# Patient Record
Sex: MALE | Race: WHITE | Employment: OTHER | URBAN - METROPOLITAN AREA
[De-identification: names, ages, dates, MRNs, and addresses within clinical notes are randomized per-mention and may not be internally consistent; named-entity substitution may affect disease eponyms.]

---

## 2017-11-17 ENCOUNTER — OFFICE VISIT (OUTPATIENT)
Dept: SURGERY | Age: 82
End: 2017-11-17

## 2017-11-17 ENCOUNTER — TELEPHONE (OUTPATIENT)
Dept: SURGERY | Age: 82
End: 2017-11-17

## 2017-11-17 VITALS
HEART RATE: 73 BPM | HEIGHT: 67 IN | DIASTOLIC BLOOD PRESSURE: 84 MMHG | BODY MASS INDEX: 32.49 KG/M2 | TEMPERATURE: 99.3 F | WEIGHT: 207 LBS | OXYGEN SATURATION: 95 % | SYSTOLIC BLOOD PRESSURE: 163 MMHG

## 2017-11-17 DIAGNOSIS — C43.61: Primary | ICD-10-CM

## 2017-11-17 PROCEDURE — 4040F PNEUMOC VAC/ADMIN/RCVD: CPT | Performed by: SURGERY

## 2017-11-17 PROCEDURE — 1123F ACP DISCUSS/DSCN MKR DOCD: CPT | Performed by: SURGERY

## 2017-11-17 PROCEDURE — G8427 DOCREV CUR MEDS BY ELIG CLIN: HCPCS | Performed by: SURGERY

## 2017-11-17 PROCEDURE — G8417 CALC BMI ABV UP PARAM F/U: HCPCS | Performed by: SURGERY

## 2017-11-17 PROCEDURE — 4004F PT TOBACCO SCREEN RCVD TLK: CPT | Performed by: SURGERY

## 2017-11-17 PROCEDURE — G8484 FLU IMMUNIZE NO ADMIN: HCPCS | Performed by: SURGERY

## 2017-11-17 PROCEDURE — 99205 OFFICE O/P NEW HI 60 MIN: CPT | Performed by: SURGERY

## 2017-11-17 RX ORDER — ASPIRIN 81 MG/1
81 TABLET ORAL DAILY
COMMUNITY

## 2017-11-17 NOTE — LETTER
[facility]) Surgical Oncology and General Surgery  [facility] MD  [address], [place] [address]  Phone: [phone]  Fax: [phone]    11/17/2017     Patient: Zenia Millan   MR Number: [id number]    YOB: 1931     Thank you for referring Zenia Millan to me for evaluation. Below are the relevant portions of my assessment and plan of care. Reason for Consult: Dr. Johnathan Benoit asked me to see Adalberto Andrews for evaluation of melanoma of right arm. Present Illness: Patient is a 80 y.o.  male presents to his dermatologist with a mole on the right arm. The lesion was approximately the size of pencil head and has been present for months. It has recently increased in size. It has not been bleeding. The lesion has not been pruritic. Patient does not have any other lesions of concern. Biopsy of the lesion was positive for melanoma. No personal or family history of melanoma. Zana does not have significant subcutaneous mass, swelling in right axilla, cough, abdominal pain, jaundice, blood in stools, headaches, recent neurological changes or bone pain to indicate any metastatic disease. Review of Systems: See HPI, all other systems reviewed and are negative. Physical Examination:    BP (!) 163/84 Comment: recheck after 5 minutes  Pulse 73   Temp 99.3 °F (37.4 °C) (Oral)   Ht 5' 7\" (1.702 m)   Wt 207 lb (93.9 kg)   SpO2 95%   BMI 32.42 kg/m²      General:  Alert, oriented x 3, cooperative, no distress, appears stated age. Skin: Skin color, texture, turgor normal. There is a biopsy site on his right . There are no surrounding areas of nodularity or pigmentation. Other suspicious skin lesions: no.   Lymph nodes: Cervical, supraclavicular, and axillary nodes normal.   HENT:  Normocephalic, without obvious abnormality. Moist mucus membranes. No icterus. Neck: Supple, symmetrical, trachea midline, no thyromegaly. wound issues were explained. Risks, benefits and alternatives of surgery explained to the patient and patient wishes to proceed with surgery. Adjuvant management will depend on final pathology. All the questions of the patient are answered to his apparent satisfaction. Patient verbalized understanding of the management plan. My office will schedule the surgery and will inform the patient about preoperative instructions. If you have questions, please do not hesitate to call me. I look forward to following  Zana along with you.     Sincerely,    Narayan Meraz MD  Surgical Oncologist / General Surgeon

## 2017-11-17 NOTE — PROGRESS NOTES
for doing this based on the [place] measures recommended the by [facility] Melanoma Unit were given. I am going to obtain PET given 3.6 mm thickness with bad prognostic factors to rule out metastatic disease. The lesion can be treated with: wide excision and sentinel lymph node biopsy under general anesthesia pending PET scan results. .  I explained patient about the surgery. All the complications including wound issues were explained. Risks, benefits and alternatives of surgery explained to the patient and patient wishes to proceed with surgery. Adjuvant management will depend on final pathology. All the questions of the patient are answered to his apparent satisfaction. Patient verbalized understanding of the management plan. My office will schedule the surgery and will inform the patient about preoperative instructions.

## 2017-11-20 ENCOUNTER — TELEPHONE (OUTPATIENT)
Dept: SURGERY | Age: 82
End: 2017-11-20

## 2017-11-30 ENCOUNTER — TELEPHONE (OUTPATIENT)
Dept: SURGERY | Age: 82
End: 2017-11-30

## 2017-11-30 ENCOUNTER — HOSPITAL ENCOUNTER (OUTPATIENT)
Dept: OTHER | Age: 82
Discharge: HOME | End: 2017-11-30
Attending: SURGERY | Admitting: SURGERY

## 2017-11-30 VITALS — HEIGHT: 67 IN | WEIGHT: 207 LBS | BODY MASS INDEX: 32.49 KG/M2

## 2017-11-30 DIAGNOSIS — C43.61: ICD-10-CM

## 2017-11-30 RX ORDER — FLUDEOXYGLUCOSE F 18 200 MCI/ML
16.14 INJECTION, SOLUTION INTRAVENOUS
Status: COMPLETED | OUTPATIENT
Start: 2017-11-30 | End: 2017-11-30

## 2017-11-30 RX ADMIN — FLUDEOXYGLUCOSE F 18 16.14 MILLICURIE: 200 INJECTION, SOLUTION INTRAVENOUS at 10:23

## 2017-12-01 ENCOUNTER — TELEPHONE (OUTPATIENT)
Dept: SURGERY | Age: 82
End: 2017-12-01

## 2017-12-05 ENCOUNTER — HOSPITAL ENCOUNTER (OUTPATIENT)
Dept: PREADMISSION TESTING | Age: 82
Discharge: HOME | End: 2017-12-05
Attending: SURGERY | Admitting: SURGERY

## 2017-12-05 VITALS — BODY MASS INDEX: 32.49 KG/M2 | HEIGHT: 67 IN | WEIGHT: 207 LBS

## 2017-12-05 RX ORDER — CHLORHEXIDINE GLUCONATE 0.12 MG/ML
15 RINSE ORAL 2 TIMES DAILY
Status: CANCELLED | OUTPATIENT
Start: 2017-12-05

## 2017-12-05 RX ORDER — CHLORHEXIDINE GLUCONATE 0.12 MG/ML
15 RINSE ORAL 2 TIMES DAILY
Status: CANCELLED | OUTPATIENT
Start: 2017-12-05 | End: 2017-12-06

## 2017-12-05 NOTE — PROGRESS NOTES
PRE-OP INSTRUCTIONS FOR THE SURGICAL PATIENT YOU ARE UNABLE TO MAKE CONTACT FOR AN INTERVIEW:  UNABLE TO LM ON HOME PHONE. LMOR FOR DAUGHTER (NUMBER GIVEN BY SURGEON'S)    1. Follow instructions for your ARRIVAL TIME as DIRECTED BY YOUR SURGEON. 2. Enter the MAIN entrance located on [address] and report to the desk. 3. Bring your insurance & prescription card and photo ID with you. You may also be asked to pay a co-pay, as you may want to bring a check or credit card with you. 4. Leave all other valuables at home. 5. Arrange for someone to drive you home and be with you for the first 24 hours after discharge. 6. You must contact your surgeon for ALL medication instructions, especially if taking blood thinners, aspirin, or diabetic medication. 7. A Pre-op History and Physical for surgery MUST be completed by your Physician or an Urgent Care within 30 days of your procedure date. Please bring a copy with you on the day of your procedure and along with any other testing performed. 8. DO NOT EAT OR DRINK ANYTHING AFTER MIDNIGHT, including gum, candy, mints or ice chips TAKE AMLODIPINE AND TAMSULOSIN  9. Dress in loose, comfortable clothing appropriate for redressing after your procedure. Do not wear jewelry (including body piercings), make-up, fingernail polish, lotion, powders or metal hairclips. Contacts will need to be removed prior to surgery. 10. If you use a CPAP, please bring it with you on the day of your procedure. 11. Do not shave or wax for 72 hours prior to procedure near your operative site  12. FOR WOMAN OF CHILDBEARING AGE ONLY- please bring a urine sample with you on day of surgery or make sure we can collect on arrival.    If you have further questions, you may contact us at [phone]    Left instructions on patient's voicemail. Eliel Burris. 12/5/2017 .1:09 PM      H&p on chart

## 2017-12-06 ENCOUNTER — HOSPITAL ENCOUNTER (OUTPATIENT)
Dept: SURGERY | Age: 82
Discharge: HOME | End: 2017-12-06
Admitting: SURGERY

## 2017-12-06 VITALS
HEIGHT: 67 IN | TEMPERATURE: 97.2 F | SYSTOLIC BLOOD PRESSURE: 153 MMHG | BODY MASS INDEX: 32.49 KG/M2 | OXYGEN SATURATION: 96 % | HEART RATE: 63 BPM | RESPIRATION RATE: 14 BRPM | WEIGHT: 207 LBS | DIASTOLIC BLOOD PRESSURE: 73 MMHG

## 2017-12-06 DIAGNOSIS — C43.61 MALIGNANT MELANOMA OF RIGHT UPPER EXTREMITY INCLUDING SHOULDER (HCC): ICD-10-CM

## 2017-12-06 PROCEDURE — 38525 BIOPSY/REMOVAL LYMPH NODES: CPT | Performed by: SURGERY

## 2017-12-06 PROCEDURE — 13122 CMPLX RPR S/A/L ADDL 5 CM/>: CPT | Performed by: SURGERY

## 2017-12-06 PROCEDURE — 13121 CMPLX RPR S/A/L 2.6-7.5 CM: CPT | Performed by: SURGERY

## 2017-12-06 PROCEDURE — 11606 EXC TR-EXT MAL+MARG >4 CM: CPT | Performed by: SURGERY

## 2017-12-06 RX ORDER — ONDANSETRON 2 MG/ML
4 INJECTION INTRAMUSCULAR; INTRAVENOUS
Status: ACTIVE | OUTPATIENT
Start: 2017-12-06 | End: 2017-12-06

## 2017-12-06 RX ORDER — HYDROCODONE BITARTRATE AND ACETAMINOPHEN 5; 325 MG/1; MG/1
1 TABLET ORAL ONCE
Status: COMPLETED | OUTPATIENT
Start: 2017-12-06 | End: 2017-12-06

## 2017-12-06 RX ORDER — HYDRALAZINE HYDROCHLORIDE 20 MG/ML
5 INJECTION INTRAMUSCULAR; INTRAVENOUS EVERY 10 MIN PRN
Status: DISCONTINUED | OUTPATIENT
Start: 2017-12-06 | End: 2017-12-07 | Stop reason: HOSPADM

## 2017-12-06 RX ORDER — LIDOCAINE HYDROCHLORIDE 10 MG/ML
1 INJECTION, SOLUTION EPIDURAL; INFILTRATION; INTRACAUDAL; PERINEURAL
Status: ACTIVE | OUTPATIENT
Start: 2017-12-06 | End: 2017-12-06

## 2017-12-06 RX ORDER — SODIUM CHLORIDE, SODIUM LACTATE, POTASSIUM CHLORIDE, CALCIUM CHLORIDE 600; 310; 30; 20 MG/100ML; MG/100ML; MG/100ML; MG/100ML
INJECTION, SOLUTION INTRAVENOUS CONTINUOUS
Status: DISCONTINUED | OUTPATIENT
Start: 2017-12-06 | End: 2017-12-07 | Stop reason: HOSPADM

## 2017-12-06 RX ORDER — HYDROCODONE BITARTRATE AND ACETAMINOPHEN 5; 325 MG/1; MG/1
1 TABLET ORAL EVERY 6 HOURS PRN
Qty: 20 TABLET | Refills: 0 | Status: SHIPPED | OUTPATIENT
Start: 2017-12-06 | End: 2017-12-11

## 2017-12-06 RX ORDER — SODIUM CHLORIDE 0.9 % (FLUSH) 0.9 %
10 SYRINGE (ML) INJECTION EVERY 12 HOURS SCHEDULED
Status: DISCONTINUED | OUTPATIENT
Start: 2017-12-06 | End: 2017-12-07 | Stop reason: HOSPADM

## 2017-12-06 RX ORDER — SODIUM CHLORIDE 0.9 % (FLUSH) 0.9 %
10 SYRINGE (ML) INJECTION PRN
Status: DISCONTINUED | OUTPATIENT
Start: 2017-12-06 | End: 2017-12-07 | Stop reason: HOSPADM

## 2017-12-06 RX ORDER — LABETALOL HYDROCHLORIDE 5 MG/ML
5 INJECTION, SOLUTION INTRAVENOUS EVERY 10 MIN PRN
Status: DISCONTINUED | OUTPATIENT
Start: 2017-12-06 | End: 2017-12-07 | Stop reason: HOSPADM

## 2017-12-06 RX ADMIN — HYDROCODONE BITARTRATE AND ACETAMINOPHEN 1 TABLET: 5; 325 TABLET ORAL at 15:59

## 2017-12-06 RX ADMIN — SODIUM CHLORIDE, SODIUM LACTATE, POTASSIUM CHLORIDE, CALCIUM CHLORIDE: 600; 310; 30; 20 INJECTION, SOLUTION INTRAVENOUS at 12:23

## 2017-12-06 ASSESSMENT — PAIN SCALES - GENERAL
PAINLEVEL_OUTOF10: 4
PAINLEVEL_OUTOF10: 0

## 2017-12-06 ASSESSMENT — PAIN DESCRIPTION - PAIN TYPE: TYPE: SURGICAL PAIN

## 2017-12-06 ASSESSMENT — PAIN DESCRIPTION - ORIENTATION: ORIENTATION: RIGHT

## 2017-12-06 ASSESSMENT — PAIN DESCRIPTION - LOCATION: LOCATION: OTHER (COMMENT)

## 2017-12-06 ASSESSMENT — PAIN - FUNCTIONAL ASSESSMENT: PAIN_FUNCTIONAL_ASSESSMENT: 0-10

## 2017-12-06 NOTE — ANESTHESIA PRE-OP
Department of Anesthesiology  Preprocedure Note       Name:  Kristin Main   Age:  80 y.o.  :  1931                                          MRN:  [id number]         Date:  2017      Surgeon:    Procedure:    Medications prior to admission:   Prior to Admission medications    Medication Sig Start Date End Date Taking? Authorizing Provider   tamsulosin (FLOMAX) 0.4 MG capsule Take 0.4 mg by mouth daily   Yes Historical Provider, MD   AmLODIPine Besylate (NORVASC PO) Take 1 tablet by mouth daily   Yes Historical Provider, MD   senna (SENOKOT) 8.6 MG tablet Take 1 tablet by mouth daily   Yes Historical Provider, MD   aspirin 81 MG EC tablet Take 81 mg by mouth daily    Historical Provider, MD       Current medications:    Current Outpatient Prescriptions   Medication Sig Dispense Refill    tamsulosin (FLOMAX) 0.4 MG capsule Take 0.4 mg by mouth daily      AmLODIPine Besylate (NORVASC PO) Take 1 tablet by mouth daily      senna (SENOKOT) 8.6 MG tablet Take 1 tablet by mouth daily      aspirin 81 MG EC tablet Take 81 mg by mouth daily       Current Facility-Administered Medications   Medication Dose Route Frequency Provider Last Rate Last Dose    lactated ringers infusion   Intravenous Continuous Calos Bro DO        ceFAZolin (ANCEF) 2 g in dextrose 3 % 50 mL IVPB (duplex)  2 g Intravenous Once Cheryl Mcconnell MD           Allergies:  No Known Allergies    Problem List:  There is no problem list on file for this patient.       Past Medical History:        Diagnosis Date    GERD with esophagitis     Hearing loss     [place]hard [place] hearing[place]     Hyperlipidemia     Hypertension     Melanoma ([address]     right elbow    Wears dentures     upper/lower       Past Surgical History:        Procedure Laterality Date    EYE SURGERY      cataracts merary  2010    FEMUR SURGERY Left     eleazar    HIP SURGERY Right     right hip plate       Social History:    Social History   Substance Use Topics    Smoking status: Former Smoker    Smokeless tobacco: Current User     Types: Chew    Alcohol use No                                Ready to quit: Not Answered  Counseling given: Not Answered      Vital Signs (Current):   Vitals:    12/06/17 0954   BP: (!) 162/80   Pulse: 77   Resp: 16   Temp: 98.1 °F (36.7 °C)   TempSrc: Oral   SpO2: 95%   Weight: 207 lb (93.9 kg)   Height: 5' 7\" (1.702 m)                                              BP Readings from Last 3 Encounters:   12/06/17 (!) 162/80   11/17/17 (!) 163/84   12/16/15 182/60       NPO Status: Time of last liquid consumption: 2300                        Time of last solid consumption: 2300                        Date of last liquid consumption: 12/05/17                        Date of last solid food consumption: 12/05/17    BMI:   Wt Readings from Last 3 Encounters:   12/06/17 207 lb (93.9 kg)   12/05/17 207 lb (93.9 kg)   11/30/17 207 lb (93.9 kg)     Body mass index is 32.42 kg/m². Anesthesia Evaluation  Patient summary reviewed no history of anesthetic complications:   Airway: Mallampati: II  TM distance: >3 FB   Neck ROM: full  Mouth opening: > = 3 FB Dental:    (+) upper dentures, lower dentures and edentulous      Pulmonary:                             ROS comment: Former smoker   Cardiovascular:    (+) hypertension:,                   Neuro/Psych:               GI/Hepatic/Renal:   (+) GERD:,           Endo/Other:                      ROS comment: melanoma Abdominal:           Vascular:                                        Anesthesia Plan      general     ASA 3     (Patient very hard of hearing)  Induction: intravenous. Anesthetic plan and risks discussed with patient.                       Sonam Martin MD   12/6/2017

## 2017-12-06 NOTE — PROGRESS NOTES
Ambulatory Surgery/Procedure Discharge Note    Vitals:    12/06/17 1541   BP: (!) 153/73   Pulse: 63   Resp: 14   Temp: 97.2 °F (36.2 °C)   SpO2: 96%       In: 158 [P.O.:60; I.V.:98]  Out: -     Pain assessment:  {Pain assessment  Pain Level:  (calls it irritating and annoying)     Clean right arm ace wrap. Right fingers warm with instant refill. Wanting pain med. Given soda and crackers. Discharge instructions reviewed. Patient and son/daughter educated, using the teach back method, about follow up instructions and discharge instructions. A completed copy of the AVS instructions given to patient and all questions answered. Up to void. Sling on per Dr Adilson Oreilly order. Aware of weight lifting restrictions. Aware not to leave arm in sling to allow right shoulder and elbow movement. Up to void. steady when up. Adhesive remover sent to remove tape securing gauze at IV site. BP within 20% of pre op  Children states they feel comfortable taking him home. 1700 He states the pain at right axilla is less than earlier and improving. Unable to assign a number to pain. Patient discharged to home/self care.  Patient discharged via wheel chair by transporter to waiting family/S.O.       12/6/2017 4:06 PM

## 2017-12-06 NOTE — H&P
David Skyler    [id number]    [facility] Same Day Surgery Update H & P  Department of General Surgery   Surgical Service   CNP Pre-operative History and Physical  Last H & P within the last 30 days. DIAGNOSIS:   MELANOMA RIGHT ELBOW / MALIGNANT     PROCEDURE:  Right Elbow Melanoma Excision And Copen Lymph Node Biopsy      HISTORY OF PRESENT ILLNESS: Pt. Is a 80 y.o. Male who is here for surgical intervention for Right Elbow Melanoma Excision And Copen Lymph Node Biopsy for treatment of Malignant Melanoma Right Elbow. Please see initial H & P     Past Medical History:        Diagnosis Date    GERD with esophagitis     Hearing loss     [place] (hard of hearing)     Hyperlipidemia     Hypertension     Melanoma ([address]     right elbow    Wears dentures     upper/lower     Past Surgical History:        Procedure Laterality Date    EYE SURGERY      cataracts merary  2010    FEMUR SURGERY Left     eleazar    HIP SURGERY Right     right hip plate     Past Social History:  Social History     Social History    Marital status:      Spouse name: N/A    Number of children: N/A    Years of education: N/A     Social History Main Topics    Smoking status: Former Smoker    Smokeless tobacco: Current User     Types: Chew    Alcohol use No    Drug use: No    Sexual activity: Not on file     Other Topics Concern    Not on file     Social History Narrative    No narrative on file         Medications Prior to Admission:      Prior to Admission medications    Medication Sig Start Date End Date Taking?  Authorizing Provider   tamsulosin (FLOMAX) 0.4 MG capsule Take 0.4 mg by mouth daily   Yes Historical Provider, MD   AmLODIPine Besylate (NORVASC PO) Take 1 tablet by mouth daily   Yes Historical Provider, MD   senna (SENOKOT) 8.6 MG tablet Take 1 tablet by mouth daily   Yes Historical Provider, MD   aspirin 81 MG EC tablet Take 81 mg by mouth daily    Historical Provider, MD         Allergies:  Review of patient's allergies indicates no known allergies. PHYSICAL EXAM:      BP (!) 162/80   Pulse 77   Temp 98.1 °F (36.7 °C) (Oral)   Resp 16   Ht 5' 7\" (1.702 m)   Wt 207 lb (93.9 kg)   SpO2 95%   BMI 32.42 kg/m²      Heart:  regular rate and rhythm,no murmur     Lungs:  No increased work of breathing, good air exchange, clear to auscultation bilaterally, no crackles or wheezing    Abdomen:  soft, non-distended, non-tender, no rebound tenderness or guarding, normal active bowel sounds and no masses palpated    ASSESSMENT AND PLAN:    1. Patient seen and focused exam done today- no new changes since last physical exam on 12/1/17    2. Access to ancillary services are available per request of the provider.     Barb Ansari CNP     12/6/2017

## 2017-12-06 NOTE — PROGRESS NOTES
PACU Transfer to [facility]    Vitals:    12/06/17 1530   BP: (!) 166/61   Pulse: 64   Resp: 12   Temp: 97.8 °F (36.6 °C)   SpO2: 95%         Intake/Output Summary (Last 24 hours) at 12/06/17 1539  Last data filed at 12/06/17 1530   Gross per 24 hour   Intake              908 ml   Output               20 ml   Net              888 ml       Pain assessment:  Patient denied pain medication when offered in PACU prior to transfer to [place] [address]. Pain Level: 4    Patient transferred to care of [facility] RN. Patient meets femi discharge criteria from PACU. All belongings sent with patient to [facility] #6. No further changes.      12/6/2017 3:39 PM

## 2017-12-12 ENCOUNTER — TELEPHONE (OUTPATIENT)
Dept: SURGERY | Age: 82
End: 2017-12-12

## 2017-12-13 ENCOUNTER — TELEPHONE (OUTPATIENT)
Dept: SURGERY | Age: 82
End: 2017-12-13

## 2017-12-13 NOTE — TELEPHONE ENCOUNTER
RN reviewed pt's pathology with pt's daughter who verbalized an understanding. Estephania Caraballo is going to discuss with her father and family as to where they would like to go from here and how aggressive there going to be and get back with RN to let us know their wishes.

## 2017-12-19 ENCOUNTER — OFFICE VISIT (OUTPATIENT)
Dept: SURGERY | Age: 82
End: 2017-12-19

## 2017-12-19 VITALS
OXYGEN SATURATION: 96 % | SYSTOLIC BLOOD PRESSURE: 160 MMHG | HEART RATE: 74 BPM | DIASTOLIC BLOOD PRESSURE: 83 MMHG | BODY MASS INDEX: 32.11 KG/M2 | TEMPERATURE: 97.6 F | WEIGHT: 205 LBS

## 2017-12-19 DIAGNOSIS — Z98.890 POSTOPERATIVE STATE: ICD-10-CM

## 2017-12-19 DIAGNOSIS — C77.9 METASTATIC MELANOMA TO LYMPH NODE (HCC): ICD-10-CM

## 2017-12-19 DIAGNOSIS — C43.61: Primary | ICD-10-CM

## 2017-12-19 PROCEDURE — 99024 POSTOP FOLLOW-UP VISIT: CPT | Performed by: SURGERY

## 2017-12-19 NOTE — LETTER
[facility]) Surgical Oncology and General Surgery  [facility] MD [address], [place] [address] [address]  Phone: [phone]  Fax: [phone]    12/19/2017     Patient: Siddharth Lemon   MR Number: [id number]    YOB: 1931     Thank you for referring Siddharth Lemon to me for evaluation. Below are the relevant portions of my assessment and plan of care. Zana is here for postop evaluation. He is gradually improving since then. Minimal pain now. No other complaints. O/E: Alert, oriented x 3, sitting comfortably with out any discomfort  No respiratory distress  Postop wound - healing well. Path - A.  Right axillary sentinel lymph node, excisional biopsy:  - Metastatic malignant melanoma involving 1 of 1 sentinel lymph node. - Metastatic malignant melanoma involving 2 of 6 additional  (non-sentinel) lymph nodes. B.  Right axillary non-sentinel lymph node, excisional biopsy:  - One lymph node, negative for neoplasm. Zac Ramos, right elbow, wide excision:  - Biopsy site changes.  - No residual melanoma identified. A/P: S/P Right elbow melanoma excision and sentinel lymph node biopsy 12-6-17, doing well  No postop complications  Path as above - lymph node positive disease  Explained in detail to patient and wife about further management. Will obtain imaging studies to rule out metastatic disease. I extensively discussed about the role of complete lymph node dissection (CLND) if metastatic workup is negative. The impact of the CLND as such on overall survival is not conclusive based on available literature. Alternatively we can follow him with clinical and ultrasound exams. Surgical details of the CLND explained in detail including possible complications. Complications include lymphedema, and nerve injury. All the questions of the patient are answered to his apparent satisfaction. Patient and his family verbalized understanding of the management plan. Will follow imaging study results. If you have questions, please do not hesitate to call me. I look forward to following  Zana along with you.     Sincerely,    Eliot Paiz MD  Surgical Oncologist / General Surgeon

## 2017-12-19 NOTE — PROGRESS NOTES
Zana is here for postop evaluation. He is gradually improving since then. Minimal pain now. No other complaints. O/E: Alert, oriented x 3, sitting comfortably with out any discomfort  No respiratory distress  Postop wound - healing well. Path - A.  Right axillary sentinel lymph node, excisional biopsy:  - Metastatic malignant melanoma involving 1 of 1 sentinel lymph node. - Metastatic malignant melanoma involving 2 of 6 additional  (non-sentinel) lymph nodes. B.  Right axillary non-sentinel lymph node, excisional biopsy:  - One lymph node, negative for neoplasm. [place], right elbow, wide excision:  - Biopsy site changes.  - No residual melanoma identified. A/P: S/P Right elbow melanoma excision and sentinel lymph node biopsy 12-6-17, doing well  No postop complications  Path as above - lymph node positive disease  Explained in detail to patient and wife about further management. Will obtain imaging studies to rule out metastatic disease. I extensively discussed about the role of complete lymph node dissection (CLND) if metastatic workup is negative. The impact of the CLND as such on overall survival is not conclusive based on available literature. Alternatively we can follow him with clinical and ultrasound exams. Surgical details of the CLND explained in detail including possible complications. Complications include lymphedema, and nerve injury. All the questions of the patient are answered to his apparent satisfaction. Patient and his family verbalized understanding of the management plan. Will follow imaging study results.

## 2017-12-20 DIAGNOSIS — C43.61: Primary | ICD-10-CM

## 2017-12-21 ENCOUNTER — TELEPHONE (OUTPATIENT)
Dept: SURGERY | Age: 82
End: 2017-12-21

## 2017-12-21 DIAGNOSIS — C43.61: Primary | ICD-10-CM

## 2017-12-21 NOTE — TELEPHONE ENCOUNTER
Patient's daughter Santo Prasad verbalized an understanding of date, time and all instructions for CT and MRI on 12/29/17 at [facility].

## 2017-12-21 NOTE — OP NOTE
Zana Voss  5/21/1931    PREOPERATIVE DIAGNOSIS: Malignant Melanoma right elbow     POSTOPERATIVE DIAGNOSIS: Same     PROCEDURE PERFORMED:  1. Wide excision of right elbow melanoma and Advancement flap closure  2. Right axillary sentinel lymph node biopsy     ATTENDING SURGEON: Mina Whitlock MD   ASSISTANT: Dr. Shireen Romano  PGY-V; Dr. Lindsey Saab  PGY-II     ANESTHESIA: General.     BLOOD LOSS: 20 ml   COMPLICATIONS : None  DRAINS: None   SPECIMENS REMOVED: tumor with skin measuring 14 cm x 5 cm. right axillary sentinel lymph node and non-sentinel lymph node. INDICATIONS FOR OPERATION:  Discussed with patient about wide excision of the melanoma and sentinel lymph node biopsy. The risks of surgery include infection, bleeding, recurrence of the lesion and seroma formation post-operatively. Patient verbalized understanding of the risks and benefits and wishes to proceed. DETAILS OF PROCEDURE: Patient was identified in the preoperative area and brought to the operating room. General anesthesia given. Operative site is prepped and draped in a sterile manner. Timeout procedure was performed confirming the procedure, site and administration of antibiotics. Patient had lymphoscintigram done before surgery. [place] lymph node is mapped to right axillary nodes as expected. Skin incision given over the sentinel node site. Subcutaneous tissues divided. Using gamma probe, hot node identified. Dissection done all around the node. Lymphatics controlled with multiple clips. Rest of the tissues divided with diathermy. There were no other hot nodes. A firm area of fat was excised and sent as non-sentinel tissue. Hemostasis checked and achieved. Wound was closed with subcutaneous interrupted vicryl sutures and continuous Monocryl subcuticular suture. Derma flex placed. Lesion and margin marked for the excision. The lesion was measuring 1 cm x 0.8 cm.  A margin of 2 cm was taken on all sides and it was converted into an eyeball shape to allow for the closure. This resulted in an excision area with measurements of 14 cm x 5 cm. Local anesthesia infiltrated. Skin incised with scalpel. Incision deepened to subcutaneous tissues. Electrocautery used for further division of tissues. Skin along with subcutaneous fat until fascia excised and oriented with sutures. Specimen sent to pathology. Hemostasis checked and achieved. Because the defect size, flaps are raised on all sides with diathermy. Flaps are raised above the fascia. The wound was closed in layers with interrupted 2-0 Vicryl sutures in subcutaneous tissues. The deeper sutures were taken through fascia to decrease dead space. Another layer of 3-0 interrupted vicryl deep dermal sutures were placed. Derma flex with preneo placed over the closed incision. Tegaderm clear dressing applied. The patient tolerated the procedure well. Patient was awakened and transferred to the recovery room uneventfully. I was present for the entire procedure.     Tom Sewell MD  Surgical Oncologist

## 2017-12-29 ENCOUNTER — HOSPITAL ENCOUNTER (OUTPATIENT)
Dept: CT IMAGING | Age: 82
Discharge: HOME | End: 2017-12-29
Admitting: SURGERY

## 2017-12-29 ENCOUNTER — TELEPHONE (OUTPATIENT)
Dept: SURGERY | Age: 82
End: 2017-12-29

## 2017-12-29 DIAGNOSIS — C43.61: ICD-10-CM

## 2017-12-29 DIAGNOSIS — C43.61 MALIGNANT MELANOMA OF RIGHT UPPER EXTREMITY INCLUDING SHOULDER (HCC): ICD-10-CM

## 2017-12-29 LAB
A/G RATIO: 1.4 (ref 1.1–2.2)
ALBUMIN SERPL-MCNC: 3.9 G/DL (ref 3.4–5)
ALP BLD-CCNC: 78 U/L (ref 40–129)
ALT SERPL-CCNC: 18 U/L (ref 10–40)
ANION GAP SERPL CALCULATED.3IONS-SCNC: 9 MMOL/L (ref 3–16)
AST SERPL-CCNC: 15 U/L (ref 15–37)
BILIRUB SERPL-MCNC: 0.4 MG/DL (ref 0–1)
BUN BLDV-MCNC: 20 MG/DL (ref 7–20)
CALCIUM SERPL-MCNC: 8.7 MG/DL (ref 8.3–10.6)
CHLORIDE BLD-SCNC: 98 MMOL/L (ref 99–110)
CO2: 30 MMOL/L (ref 21–32)
CREAT SERPL-MCNC: 1.2 MG/DL (ref 0.8–1.3)
GFR AFRICAN AMERICAN: >60
GFR NON-AFRICAN AMERICAN: 57
GLOBULIN: 2.7 G/DL
GLUCOSE BLD-MCNC: 107 MG/DL (ref 70–99)
POTASSIUM SERPL-SCNC: 3.8 MMOL/L (ref 3.5–5.1)
SODIUM BLD-SCNC: 137 MMOL/L (ref 136–145)
TOTAL PROTEIN: 6.6 G/DL (ref 6.4–8.2)

## 2018-04-09 DIAGNOSIS — C43.61 MALIGNANT MELANOMA OF RIGHT UPPER EXTREMITY INCLUDING SHOULDER (HCC): Primary | ICD-10-CM

## 2018-04-09 DIAGNOSIS — C43.61: ICD-10-CM

## 2018-04-24 ENCOUNTER — TELEPHONE (OUTPATIENT)
Dept: SURGERY | Age: 83
End: 2018-04-24

## 2018-05-08 ENCOUNTER — HOSPITAL ENCOUNTER (OUTPATIENT)
Dept: CT IMAGING | Facility: MEDICAL CENTER | Age: 83
Discharge: HOME | End: 2018-05-08
Admitting: INTERNAL MEDICINE

## 2018-05-08 DIAGNOSIS — C43.61 MALIGNANT MELANOMA OF RIGHT UPPER EXTREMITY (HCC): ICD-10-CM

## 2018-05-08 DIAGNOSIS — C43.61 MALIGNANT MELANOMA OF RIGHT UPPER EXTREMITY INCLUDING SHOULDER (HCC): ICD-10-CM

## 2022-02-25 ENCOUNTER — HOSPITAL ENCOUNTER (INPATIENT)
Age: 87
LOS: 3 days | Discharge: HOME | DRG: 378 | End: 2022-02-28
Attending: INTERNAL MEDICINE | Admitting: INTERNAL MEDICINE
Payer: MEDICARE

## 2022-02-25 PROBLEM — K92.2 GI BLEED: Status: ACTIVE | Noted: 2022-02-25

## 2022-02-25 LAB
ABO/RH: NORMAL
ANION GAP SERPL CALCULATED.3IONS-SCNC: 8 MMOL/L (ref 3–16)
ANTIBODY SCREEN: NORMAL
BASOPHILS ABSOLUTE: 0 K/UL (ref 0–0.2)
BASOPHILS RELATIVE PERCENT: 0.3 %
BUN BLDV-MCNC: 14 MG/DL (ref 7–20)
CALCIUM SERPL-MCNC: 7.9 MG/DL (ref 8.3–10.6)
CHLORIDE BLD-SCNC: 111 MMOL/L (ref 99–110)
CO2: 23 MMOL/L (ref 21–32)
CREAT SERPL-MCNC: 0.8 MG/DL (ref 0.8–1.3)
EOSINOPHILS ABSOLUTE: 0.1 K/UL (ref 0–0.6)
EOSINOPHILS RELATIVE PERCENT: 1.5 %
GFR AFRICAN AMERICAN: >60
GFR NON-AFRICAN AMERICAN: >60
GLUCOSE BLD-MCNC: 113 MG/DL (ref 70–99)
HCT VFR BLD CALC: 30.7 % (ref 40.5–52.5)
HCT VFR BLD CALC: 34.9 % (ref 40.5–52.5)
HEMOGLOBIN: 10.3 G/DL (ref 13.5–17.5)
HEMOGLOBIN: 11.8 G/DL (ref 13.5–17.5)
LYMPHOCYTES ABSOLUTE: 1.2 K/UL (ref 1–5.1)
LYMPHOCYTES RELATIVE PERCENT: 17 %
MCH RBC QN AUTO: 28.8 PG (ref 26–34)
MCHC RBC AUTO-ENTMCNC: 33.7 G/DL (ref 31–36)
MCV RBC AUTO: 85.6 FL (ref 80–100)
MONOCYTES ABSOLUTE: 0.4 K/UL (ref 0–1.3)
MONOCYTES RELATIVE PERCENT: 6.4 %
NEUTROPHILS ABSOLUTE: 5.1 K/UL (ref 1.7–7.7)
NEUTROPHILS RELATIVE PERCENT: 74.8 %
PDW BLD-RTO: 15.7 % (ref 12.4–15.4)
PLATELET # BLD: 215 K/UL (ref 135–450)
PMV BLD AUTO: 6.9 FL (ref 5–10.5)
POTASSIUM SERPL-SCNC: 4.2 MMOL/L (ref 3.5–5.1)
RBC # BLD: 4.08 M/UL (ref 4.2–5.9)
SODIUM BLD-SCNC: 142 MMOL/L (ref 136–145)
WBC # BLD: 6.9 K/UL (ref 4–11)

## 2022-02-25 PROCEDURE — 1200000000 HC SEMI PRIVATE

## 2022-02-25 PROCEDURE — 36415 COLL VENOUS BLD VENIPUNCTURE: CPT

## 2022-02-25 PROCEDURE — 85014 HEMATOCRIT: CPT

## 2022-02-25 PROCEDURE — 6360000002 HC RX W HCPCS: Performed by: PHYSICIAN ASSISTANT

## 2022-02-25 PROCEDURE — 80048 BASIC METABOLIC PNL TOTAL CA: CPT

## 2022-02-25 PROCEDURE — 86900 BLOOD TYPING SEROLOGIC ABO: CPT

## 2022-02-25 PROCEDURE — 85018 HEMOGLOBIN: CPT

## 2022-02-25 PROCEDURE — 2580000003 HC RX 258: Performed by: PHYSICIAN ASSISTANT

## 2022-02-25 PROCEDURE — 85025 COMPLETE CBC W/AUTO DIFF WBC: CPT

## 2022-02-25 PROCEDURE — 6370000000 HC RX 637 (ALT 250 FOR IP): Performed by: INTERNAL MEDICINE

## 2022-02-25 PROCEDURE — C9113 INJ PANTOPRAZOLE SODIUM, VIA: HCPCS | Performed by: PHYSICIAN ASSISTANT

## 2022-02-25 PROCEDURE — 86850 RBC ANTIBODY SCREEN: CPT

## 2022-02-25 PROCEDURE — 86901 BLOOD TYPING SEROLOGIC RH(D): CPT

## 2022-02-25 RX ORDER — CLOPIDOGREL BISULFATE 75 MG/1
75 TABLET ORAL DAILY
Status: ON HOLD | COMMUNITY
End: 2022-02-28 | Stop reason: HOSPADM

## 2022-02-25 RX ORDER — ACETAMINOPHEN 650 MG/1
650 SUPPOSITORY RECTAL EVERY 6 HOURS PRN
Status: DISCONTINUED | OUTPATIENT
Start: 2022-02-25 | End: 2022-02-28 | Stop reason: HOSPADM

## 2022-02-25 RX ORDER — PANTOPRAZOLE SODIUM 40 MG/10ML
40 INJECTION, POWDER, LYOPHILIZED, FOR SOLUTION INTRAVENOUS EVERY 12 HOURS
Status: DISCONTINUED | OUTPATIENT
Start: 2022-02-25 | End: 2022-02-28 | Stop reason: HOSPADM

## 2022-02-25 RX ORDER — SODIUM CHLORIDE 9 MG/ML
25 INJECTION, SOLUTION INTRAVENOUS PRN
Status: DISCONTINUED | OUTPATIENT
Start: 2022-02-25 | End: 2022-02-28 | Stop reason: HOSPADM

## 2022-02-25 RX ORDER — SODIUM CHLORIDE 0.9 % (FLUSH) 0.9 %
5-40 SYRINGE (ML) INJECTION PRN
Status: DISCONTINUED | OUTPATIENT
Start: 2022-02-25 | End: 2022-02-28 | Stop reason: HOSPADM

## 2022-02-25 RX ORDER — ACETAMINOPHEN 325 MG/1
650 TABLET ORAL EVERY 6 HOURS PRN
Status: DISCONTINUED | OUTPATIENT
Start: 2022-02-25 | End: 2022-02-28 | Stop reason: HOSPADM

## 2022-02-25 RX ORDER — SODIUM CHLORIDE 0.9 % (FLUSH) 0.9 %
5-40 SYRINGE (ML) INJECTION EVERY 12 HOURS SCHEDULED
Status: DISCONTINUED | OUTPATIENT
Start: 2022-02-25 | End: 2022-02-28 | Stop reason: HOSPADM

## 2022-02-25 RX ORDER — SODIUM CHLORIDE 9 MG/ML
10 INJECTION INTRAVENOUS EVERY 12 HOURS
Status: DISCONTINUED | OUTPATIENT
Start: 2022-02-25 | End: 2022-02-28 | Stop reason: HOSPADM

## 2022-02-25 RX ORDER — ONDANSETRON 4 MG/1
4 TABLET, ORALLY DISINTEGRATING ORAL EVERY 8 HOURS PRN
Status: DISCONTINUED | OUTPATIENT
Start: 2022-02-25 | End: 2022-02-28 | Stop reason: HOSPADM

## 2022-02-25 RX ORDER — AMLODIPINE BESYLATE 5 MG/1
5 TABLET ORAL DAILY
Status: DISCONTINUED | OUTPATIENT
Start: 2022-02-25 | End: 2022-02-28 | Stop reason: HOSPADM

## 2022-02-25 RX ORDER — SODIUM CHLORIDE 9 MG/ML
INJECTION, SOLUTION INTRAVENOUS CONTINUOUS
Status: DISCONTINUED | OUTPATIENT
Start: 2022-02-25 | End: 2022-02-27

## 2022-02-25 RX ORDER — TAMSULOSIN HYDROCHLORIDE 0.4 MG/1
0.4 CAPSULE ORAL DAILY
Status: DISCONTINUED | OUTPATIENT
Start: 2022-02-25 | End: 2022-02-28 | Stop reason: HOSPADM

## 2022-02-25 RX ORDER — ONDANSETRON 2 MG/ML
4 INJECTION INTRAMUSCULAR; INTRAVENOUS EVERY 6 HOURS PRN
Status: DISCONTINUED | OUTPATIENT
Start: 2022-02-25 | End: 2022-02-28 | Stop reason: HOSPADM

## 2022-02-25 RX ADMIN — PANTOPRAZOLE SODIUM 40 MG: 40 INJECTION, POWDER, FOR SOLUTION INTRAVENOUS at 19:11

## 2022-02-25 RX ADMIN — Medication 10 ML: at 19:12

## 2022-02-25 RX ADMIN — AMLODIPINE BESYLATE 5 MG: 5 TABLET ORAL at 20:17

## 2022-02-25 RX ADMIN — SODIUM CHLORIDE: 9 INJECTION, SOLUTION INTRAVENOUS at 18:46

## 2022-02-25 RX ADMIN — TAMSULOSIN HYDROCHLORIDE 0.4 MG: 0.4 CAPSULE ORAL at 20:17

## 2022-02-25 NOTE — PLAN OF CARE
89M presented with [facility] ER with complaint of GI bleeding with purple black stool in commode  Reportedly recently started plavix for ?TIA    Hb 12.5, HR 86,  and /76 in ER    Admit to med surg  GI c/s  IV PPI  IVF  Monitor h/h    **med rec will need to be completed and home meds reordered when patient arrives

## 2022-02-25 NOTE — PROGRESS NOTES
Lisy served dia tomlinson the pa and didn't get a response so I called [facility] health and called the consult again to make sure they were aware

## 2022-02-25 NOTE — PROGRESS NOTES
Consult has been called to Dr. Carmelita Andersen on 2/25/22. Spoke with perfect served dr Carmelita Andersen.  5:50 PM    Humphrey Mansfield  2/25/2022

## 2022-02-25 NOTE — PROGRESS NOTES
Patient admitted to room 217 from [facility]. Patient oriented to room, call light, bed rails, phone, lights and bathroom. Patient instructed about the schedule of the day including: vital sign frequency, lab draws, possible tests, frequency of MD and staff rounds, daily weights, I &O's and prescribed diet. Bed alarm deferred patient low fall risk and refuses alarm. Bed locked, in lowest position, side rails up 2/4, call light within reach. Family visited briefly and dropped off belongings. Spoke with Dr. Jerrica Wood from [address] [facility] after midnight possible EGD in AM.     Recliner Assessment:     Patient is able to demonstrate the ability to move from a reclining position to an upright position within the recliner.

## 2022-02-26 LAB
ANION GAP SERPL CALCULATED.3IONS-SCNC: 8 MMOL/L (ref 3–16)
BASOPHILS ABSOLUTE: 0 K/UL (ref 0–0.2)
BASOPHILS RELATIVE PERCENT: 0.5 %
BUN BLDV-MCNC: 14 MG/DL (ref 7–20)
CALCIUM SERPL-MCNC: 7.7 MG/DL (ref 8.3–10.6)
CHLORIDE BLD-SCNC: 111 MMOL/L (ref 99–110)
CO2: 21 MMOL/L (ref 21–32)
CREAT SERPL-MCNC: 0.9 MG/DL (ref 0.8–1.3)
EOSINOPHILS ABSOLUTE: 0.2 K/UL (ref 0–0.6)
EOSINOPHILS RELATIVE PERCENT: 2.4 %
GFR AFRICAN AMERICAN: >60
GFR NON-AFRICAN AMERICAN: >60
GLUCOSE BLD-MCNC: 97 MG/DL (ref 70–99)
HCT VFR BLD CALC: 29 % (ref 40.5–52.5)
HCT VFR BLD CALC: 29.5 % (ref 40.5–52.5)
HCT VFR BLD CALC: 31.4 % (ref 40.5–52.5)
HEMOGLOBIN: 10.4 G/DL (ref 13.5–17.5)
HEMOGLOBIN: 9.8 G/DL (ref 13.5–17.5)
HEMOGLOBIN: 9.9 G/DL (ref 13.5–17.5)
LYMPHOCYTES ABSOLUTE: 1.5 K/UL (ref 1–5.1)
LYMPHOCYTES RELATIVE PERCENT: 24.1 %
MCH RBC QN AUTO: 28.8 PG (ref 26–34)
MCHC RBC AUTO-ENTMCNC: 33.6 G/DL (ref 31–36)
MCV RBC AUTO: 85.7 FL (ref 80–100)
MONOCYTES ABSOLUTE: 0.6 K/UL (ref 0–1.3)
MONOCYTES RELATIVE PERCENT: 8.9 %
NEUTROPHILS ABSOLUTE: 4 K/UL (ref 1.7–7.7)
NEUTROPHILS RELATIVE PERCENT: 64.1 %
PDW BLD-RTO: 16.4 % (ref 12.4–15.4)
PLATELET # BLD: 199 K/UL (ref 135–450)
PMV BLD AUTO: 6.9 FL (ref 5–10.5)
POTASSIUM REFLEX MAGNESIUM: 3.8 MMOL/L (ref 3.5–5.1)
RBC # BLD: 3.44 M/UL (ref 4.2–5.9)
SODIUM BLD-SCNC: 140 MMOL/L (ref 136–145)
WBC # BLD: 6.3 K/UL (ref 4–11)

## 2022-02-26 PROCEDURE — 80048 BASIC METABOLIC PNL TOTAL CA: CPT

## 2022-02-26 PROCEDURE — 6360000002 HC RX W HCPCS: Performed by: PHYSICIAN ASSISTANT

## 2022-02-26 PROCEDURE — 7100000011 HC PHASE II RECOVERY - ADDTL 15 MIN: Performed by: INTERNAL MEDICINE

## 2022-02-26 PROCEDURE — 85025 COMPLETE CBC W/AUTO DIFF WBC: CPT

## 2022-02-26 PROCEDURE — 2580000003 HC RX 258: Performed by: PHYSICIAN ASSISTANT

## 2022-02-26 PROCEDURE — 36415 COLL VENOUS BLD VENIPUNCTURE: CPT

## 2022-02-26 PROCEDURE — 6370000000 HC RX 637 (ALT 250 FOR IP): Performed by: INTERNAL MEDICINE

## 2022-02-26 PROCEDURE — 99232 SBSQ HOSP IP/OBS MODERATE 35: CPT | Performed by: INTERNAL MEDICINE

## 2022-02-26 PROCEDURE — 99152 MOD SED SAME PHYS/QHP 5/>YRS: CPT | Performed by: INTERNAL MEDICINE

## 2022-02-26 PROCEDURE — 3609012400 HC EGD TRANSORAL BIOPSY SINGLE/MULTIPLE: Performed by: INTERNAL MEDICINE

## 2022-02-26 PROCEDURE — 1200000000 HC SEMI PRIVATE

## 2022-02-26 PROCEDURE — 0DB68ZX EXCISION OF STOMACH, VIA NATURAL OR ARTIFICIAL OPENING ENDOSCOPIC, DIAGNOSTIC: ICD-10-PCS | Performed by: INTERNAL MEDICINE

## 2022-02-26 PROCEDURE — 85018 HEMOGLOBIN: CPT

## 2022-02-26 PROCEDURE — 88305 TISSUE EXAM BY PATHOLOGIST: CPT

## 2022-02-26 PROCEDURE — 6360000002 HC RX W HCPCS: Performed by: INTERNAL MEDICINE

## 2022-02-26 PROCEDURE — C9113 INJ PANTOPRAZOLE SODIUM, VIA: HCPCS | Performed by: PHYSICIAN ASSISTANT

## 2022-02-26 PROCEDURE — 85014 HEMATOCRIT: CPT

## 2022-02-26 PROCEDURE — 7100000010 HC PHASE II RECOVERY - FIRST 15 MIN: Performed by: INTERNAL MEDICINE

## 2022-02-26 PROCEDURE — 2709999900 HC NON-CHARGEABLE SUPPLY: Performed by: INTERNAL MEDICINE

## 2022-02-26 RX ORDER — MIDAZOLAM HYDROCHLORIDE 5 MG/ML
INJECTION INTRAMUSCULAR; INTRAVENOUS PRN
Status: DISCONTINUED | OUTPATIENT
Start: 2022-02-26 | End: 2022-02-26 | Stop reason: ALTCHOICE

## 2022-02-26 RX ORDER — FENTANYL CITRATE 50 UG/ML
INJECTION, SOLUTION INTRAMUSCULAR; INTRAVENOUS PRN
Status: DISCONTINUED | OUTPATIENT
Start: 2022-02-26 | End: 2022-02-26 | Stop reason: ALTCHOICE

## 2022-02-26 RX ADMIN — ONDANSETRON HYDROCHLORIDE 4 MG: 2 INJECTION, SOLUTION INTRAMUSCULAR; INTRAVENOUS at 20:30

## 2022-02-26 RX ADMIN — TAMSULOSIN HYDROCHLORIDE 0.4 MG: 0.4 CAPSULE ORAL at 12:48

## 2022-02-26 RX ADMIN — AMLODIPINE BESYLATE 5 MG: 5 TABLET ORAL at 12:48

## 2022-02-26 RX ADMIN — POLYETHYLENE GLYCOL 3350, SODIUM SULFATE ANHYDROUS, SODIUM BICARBONATE, SODIUM CHLORIDE, POTASSIUM CHLORIDE 4000 ML: 236; 22.74; 6.74; 5.86; 2.97 POWDER, FOR SOLUTION ORAL at 17:48

## 2022-02-26 RX ADMIN — Medication 10 ML: at 20:31

## 2022-02-26 RX ADMIN — Medication 10 ML: at 17:48

## 2022-02-26 RX ADMIN — PANTOPRAZOLE SODIUM 40 MG: 40 INJECTION, POWDER, FOR SOLUTION INTRAVENOUS at 17:48

## 2022-02-26 RX ADMIN — PANTOPRAZOLE SODIUM 40 MG: 40 INJECTION, POWDER, FOR SOLUTION INTRAVENOUS at 05:09

## 2022-02-26 RX ADMIN — Medication 10 ML: at 05:10

## 2022-02-26 NOTE — PROGRESS NOTES
Admission assessment completed. Bedside Mobility Assessment Tool (BMAT):     Assessment Level 1- Sit and Shake    1. From a semi-reclined position, ask patient to sit up and rotate to a seated position at the side of the bed. Can use the bedrail. 2. Ask patient to reach out and grab your hand and shake making sure patient reaches across his/her midline. Pass- Patient is able to come to a seated position, maintain core strength. Maintains seated balance while reaching across midline. Move on to Assessment Level 2. Assessment Level 2- Stretch and Point   1. With patient in seated position at the side of the bed, have patient place both feet on the floor (or stool) with knees no higher than hips. 2. Ask patient to stretch one leg and straighten the knee, then bend the ankle/flex and point the toes. If appropriate, repeat with the other leg. Pass- Patient is able to demonstrate appropriate quad strength on intended weight bearing limb(s). Move onto Assessment Level 3. Assessment Level 3- Stand   1. Ask patient to elevate off the bed or chair (seated to standing) using an assistive device (cane, bedrail). 2. Patient should be able to raise buttocks off be and hold for a count of five. May repeat once. Pass- Patient maintains standing stability for at least 5 seconds, proceed to assessment level 4. Assessment Level 4- Walk   1. Ask patient to march in place at bedside. 2. Then ask patient to advance step and return each foot. Some medical conditions may render a patient from stepping backwards, use your best clinical judgement. Pass- Patient demonstrates balance while shifting weight and ability to step, takes independent steps, does not use assistive device patient is MOBILITY LEVEL 4.       Mobility Level- 4

## 2022-02-26 NOTE — CONSULTS
Consultation Note    Patient Name: Chinmay Fitzgerald  : 1931  Age: 80 y.o. Admitting Physician: Betsy Becerril MD   Date of Admission: 2022  5:34 PM   Primary Care Physician: Ed Ford MD        Chinmay Fitzgerald is being seen at the request of Betsy Becerril MD for rectal bleeding. History of Present Illness:  49-year-old male transferred from [facility] ER with complaints of having rectal bleeding. As per the patient he noticed red blood mixed with black stool since yesterday morning. He has had few bloody episodes since then and hence he went to the ER. He was transferred to [facility] for further management. Patient denies having any abdominal pain. Denies being any nausea vomiting or hematemesis. Denies having any fever or chills. Denies any dizziness/syncopal episodes. Denies previous history of peptic ulcer disease or similar history of any GI bleed    Hemoglobin 11.8 yesterday on admission. Today it is 9.9. GI History:  As per the patient he had EGD/colonoscopy few years back at [facility], details not available      Past Medical History:  Past Medical History:   Diagnosis Date    COVID 2021    GERD with esophagitis     Hearing loss     [place] (hard of hearing)     Hyperlipidemia     Hypertension     Melanoma ([address]     right elbow    TIA (transient ischemic attack)     Wears dentures     upper/lower        Past Surgical History:  Past Surgical History:   Procedure Laterality Date    EYE SURGERY      cataracts merary      FEMUR SURGERY Left     eleazar    HIP SURGERY Right     right hip plate    OTHER SURGICAL HISTORY Right 2017    RIGHT ELBOW MELANOMA EXCISION AND RIGHT AXILLARY SENTINEL        Historical Medications:  Prior to Visit Medications    Medication Sig Taking?  Authorizing Provider   clopidogrel (PLAVIX) 75 MG tablet Take 75 mg by mouth daily Yes Historical Provider, MD   aspirin 81 MG EC tablet Take 81 mg by mouth daily  Historical Provider, MD   tamsulosin (FLOMAX) 0.4 MG capsule Take 0.4 mg by mouth daily  Historical Provider, MD   AmLODIPine Besylate (NORVASC PO) Take 5 mg by mouth daily   Historical Provider, MD   senna (SENOKOT) 8.6 MG tablet Take 1 tablet by mouth daily  Historical Provider, MD        [facility] Medications:  Current Facility-Administered Medications: sodium chloride flush 0.9 % injection 5-40 mL, 5-40 mL, IntraVENous, 2 times per day  sodium chloride flush 0.9 % injection 5-40 mL, 5-40 mL, IntraVENous, PRN  0.9 % sodium chloride infusion, 25 mL, IntraVENous, PRN  ondansetron (ZOFRAN-ODT) disintegrating tablet 4 mg, 4 mg, Oral, Q8H PRN **OR** ondansetron (ZOFRAN) injection 4 mg, 4 mg, IntraVENous, Q6H PRN  acetaminophen (TYLENOL) tablet 650 mg, 650 mg, Oral, Q6H PRN **OR** acetaminophen (TYLENOL) suppository 650 mg, 650 mg, Rectal, Q6H PRN  0.9 % sodium chloride infusion, , IntraVENous, Continuous  pantoprazole (PROTONIX) injection 40 mg, 40 mg, IntraVENous, Q12H **AND** sodium chloride (PF) 0.9 % injection 10 mL, 10 mL, IntraVENous, Q12H  amLODIPine (NORVASC) tablet 5 mg, 5 mg, Oral, Daily  tamsulosin (FLOMAX) capsule 0.4 mg, 0.4 mg, Oral, Daily     Social History:   Social History     Tobacco History     Smoking Status  Former Smoker    Smokeless Tobacco Use  Current User Smokeless Tobacco Type  Chew          Alcohol History     Alcohol Use Status  No          Drug Use     Drug Use Status  No          Sexual Activity     Sexually Active  Not Asked                 Family History:  No family history on file.      Allergies:  No Known Allergies     ROS:   General: No fever or weight change  Hematologic: No unexpected submucosal bleeding or bruising  HEENT: No sore throat or facial pain  Respiratory: No cough or dyspnea  Cardiovascular: No angina or dependent edema  Gastrointestinal: See HPI  Musculoskeletal: No usual joint pain or stiffness  Skin: No skin eruptions or changing

## 2022-02-26 NOTE — H&P
Hospital Medicine History & Physical      PCP: Cheryl Robertson MD    Date of Admission: 2/25/2022    Date of Service: Pt seen/examined on 2/25/2022    Chief Complaint: GI bleed    History Of Present Illness:    80 y.o. male who presented to the hospital from [facility] emergency department with a chief complaint rectal hemorrhage. Patient states he woke up this morning, when he used the bathroom he found blood on the toilet. After that he has had for episodes of bloody bowel movements all today. He denies any abdominal pain, nausea, no vomiting. He has never had anything like this in the past.  The patient recently had a TIA and was started on Plavix. He was transferred for GI evaluation. Past Medical History:        Diagnosis Date    COVID 12/2021    GERD with esophagitis     Hearing loss     [place] (hard of hearing)     Hyperlipidemia     Hypertension     Melanoma ([address]     right elbow    TIA (transient ischemic attack)     Wears dentures     upper/lower       Past Surgical History:        Procedure Laterality Date    EYE SURGERY      cataracts merary  2010    FEMUR SURGERY Left     eleazar    HIP SURGERY Right     right hip plate    OTHER SURGICAL HISTORY Right 12/06/2017    RIGHT ELBOW MELANOMA EXCISION AND RIGHT AXILLARY SENTINEL       Medications Prior to Admission:      Prior to Admission medications    Medication Sig Start Date End Date Taking? Authorizing Provider   clopidogrel (PLAVIX) 75 MG tablet Take 75 mg by mouth daily   Yes Historical Provider, MD   aspirin 81 MG EC tablet Take 81 mg by mouth daily    Historical Provider, MD   tamsulosin (FLOMAX) 0.4 MG capsule Take 0.4 mg by mouth daily    Historical Provider, MD   AmLODIPine Besylate (NORVASC PO) Take 5 mg by mouth daily     Historical Provider, MD   senna (SENOKOT) 8.6 MG tablet Take 1 tablet by mouth daily    Historical Provider, MD       Allergies:  Patient has no known allergies.     Social History:      TOBACCO: reports that he has quit smoking. His smokeless tobacco use includes chew. ETOH:   reports no history of alcohol use. Family History:      No family history on file. REVIEW OF SYSTEMS:   Constitutional:  Negative for fever,chills or night sweats  ENT:  Negative for rhinorrhea, epistaxis, hoarseness, sore throat. Respiratory: Negative for SOB or wheezing   Cardiovascular:   Negative for  chest pain, palpitations   Gastrointestinal:   Rectal hemorrhage  Genitourinary:  Negative for polyuria, dysuria   Hematologic/Lymphatic:  Negative for  bleeding tendency, easy bruising  Musculoskeletal:  Negative for myalgias and arthralgias  Neurologic:  Negative for  confusion,dysarthria. Skin:  Negative for itching,rash  Psychiatric:  Negative for depression,anxiety, agitation. Endocrine:  Negative for polydipsia,polyuria,heat /cold intolerance. PHYSICAL EXAM:    BP (!) 156/62   Pulse 74   Temp 97.7 °F (36.5 °C) (Oral)   Resp 16   Ht 5' 8\" (1.727 m)   Wt 204 lb (92.5 kg)   SpO2 96%   BMI 31.02 kg/m²     General appearance:  No apparent distress, appears stated age and cooperative. HEENT:  Normal cephalic, atraumatic without obvious deformity. Pupils equal, round, and reactive to light. Extra ocular muscles intact. Conjunctivae/corneas clear. Neck: Supple, with full range of motion. No jugular venous distention. Trachea midline. Respiratory:  Normal respiratory effort. Clear to auscultation, bilaterally without Rales/Wheezes/Rhonchi. Cardiovascular:  Regular rate and rhythm with normal S1/S2 without murmurs, rubs or gallops. Abdomen: Soft, non-tender, non-distended with normal bowel sounds. Musculoskeletal:  No clubbing, cyanosis or edema bilaterally. Full range of motion without deformity. Skin: Skin color, texture, turgor normal.  No rashes or lesions. Neurologic:  Neurovascularly intact without any focal sensory/motor deficits.  Cranial nerves: II-XII intact, grossly non-focal.  Psychiatric: Alert and oriented, thought content appropriate, normal insight  Capillary Refill: Brisk,< 3 seconds   Peripheral Pulses: +2 palpable, equal bilaterally       Labs:   Recent Labs     02/25/22  1749   WBC 6.9   HGB 11.8*   HCT 34.9*        Recent Labs     02/25/22  1749      K 4.2   *   CO2 23   BUN 14   CREATININE 0.8   CALCIUM 7.9*       ASSESSMENT:  Acute GI bleed  Acute blood loss anemia  Recent TIA, started on Plavix  BPH  Hypertension  Hard of hearing    PLAN:  N.p.o. midnight, GI consult  IV fluids, Protonix twice daily  Hold Plavix  Resume blood pressure medications      DVT Prophylaxis: SCDs  Diet: ADULT DIET; Clear Liquid  Diet NPO  Code Status: Full Code    Dispo -admit to [facility] on telemetry    Thank you for the opportunity to be involved in this patient's care.       (Please note that portions of this note were completed with a voice recognition program. Efforts were made to edit the dictations but occasionally words are mis-transcribed.)

## 2022-02-26 NOTE — PROGRESS NOTES
AM assessment completed, see flow sheet. Pt is alert and oriented. Vital signs are WNL. Respirations are even & easy. No complaints voiced. Pt seen and examined by Dr. Vicente Primrose consult and obtained informed consent. Pt denies needs at this time. SR up x 2, and bed in low position. Call light is within reach.

## 2022-02-26 NOTE — PROGRESS NOTES
Progress Note    Admit Date:  2/25/2022    89M with recent TIA started on plavix 3-4 weeks ago in addition to long time use of aspirin presented to [facility] ER with rectal bleeding    Subjective:  Mr. Mily Anderson has no complaints today  - 2 episodes of bloody stool overnight  - Hb 12 in ER-> 9.9 today  - seen by GI- going for EGD today    Objective:   Patient Vitals for the past 4 hrs:   BP Temp Temp src Pulse Resp SpO2   02/26/22 0815 135/68 97.6 °F (36.4 °C) Oral 85 16 93 %            Intake/Output Summary (Last 24 hours) at 2/26/2022 0839  Last data filed at 2/26/2022 0347  Gross per 24 hour   Intake 120 ml   Output 700 ml   Net -580 ml       Physical Exam:    Gen: No distress. Alert. [place]  Eyes: PERRL. No sclera icterus. No conjunctival injection. ENT: No discharge. Pharynx clear. Neck: No JVD. Trachea midline. Resp: No accessory muscle use. No crackles. No wheezes. No rhonchi. CV: Regular rate. Regular rhythm. No murmur. No rub. No edema. GI: Non-tender. Non-distended. Normal bowel sounds. Skin: Warm and dry. No nodule on exposed extremities. No rash on exposed extremities. M/S: No cyanosis. No joint deformity. No clubbing. Neuro: Awake. Grossly nonfocal    Psych: Oriented x 3. No anxiety or agitation. Caden Escobedo MD [facility] and personally interviewed and examined patient, reviewed the data (labs and imaging) and after discussion with my PA formulated the plan. Agree with note with the following edits. HPI:     I reviewed the patient's Past Medical History, Past Surgical History, Medications, and Allergies. No further bleeding. No abd pain   Severely [place]  Recently started on PLavix 3 weeks ago        General: elderly male up in bed  Awake, alert and oriented.  Appears to be not in any distress  Mucous Membranes:  Pink , anicteric  Neck: No JVD, no carotid bruit, no thyromegaly  Chest:  Clear to auscultation bilaterally, no added sounds  Cardiovascular:  RRR S1S2 heard, no murmurs or gallops  Abdomen:  Soft, undistended, non tender, no organomegaly, BS present  Extremities: No edema or cyanosis. Distal pulses well felt  Neurological : grossly normal with severe [place]                    Scheduled Meds:   sodium chloride flush  5-40 mL IntraVENous 2 times per day    pantoprazole  40 mg IntraVENous Q12H    And    sodium chloride (PF)  10 mL IntraVENous Q12H    amLODIPine  5 mg Oral Daily    tamsulosin  0.4 mg Oral Daily       Continuous Infusions:   sodium chloride      sodium chloride 125 mL/hr at 02/25/22 1846       PRN Meds:  sodium chloride flush, sodium chloride, ondansetron **OR** ondansetron, acetaminophen **OR** acetaminophen      Data:  CBC:   Recent Labs     02/25/22  1749 02/25/22  2342 02/26/22  0510   WBC 6.9  --  6.3   HGB 11.8* 10.3* 9.9*   HCT 34.9* 30.7* 29.5*   MCV 85.6  --  85.7     --  199     BMP:   Recent Labs     02/25/22  1749 02/26/22  0510    140   K 4.2 3.8   * 111*   CO2 23 21   BUN 14 14   CREATININE 0.8 0.9     LIVER PROFILE: No results for input(s): AST, ALT, LIPASE, BILIDIR, BILITOT, ALKPHOS in the last 72 hours. Invalid input(s): AMYLASE,  ALB  PT/INR: No results for input(s): PROTIME, INR in the last 72 hours. CULTURES  None      RADIOLOGY  No orders to display         Assessment/Plan:    #Acute GI Bleed  - presents with maroon colored blood per rectum, no prior h/o GI bleed. Reports that he had a c scope in [facility] last year- records are not readily available to review   - Seen by GI- for EGD today  - holding antiplatelets- plavix - this was started 3 weeks ago  - cont IVF and IV PPI    #Acute blood loss anemia  - Hb 12 in the ER-> 9.9 today  - monitor h.h and transfuse for hb<7    #Recent TIA  -patient reports COVID 19 illness >4 weeks ago, was started on plavix 3-4 weeks ago with concern for TIA. He also has been on ASA for several years.   No prior history of CVA or CAD  - holding antiplatelets- will dc plavix at this time     #BPH  - cont flomax    #HTN  - BP stable- cont flomax     DVT Prophylaxis: SCD  Diet: Diet NPO  Code Status: Full Code    Jamar Schmidt PA-C  2/26/2022 8:43 AM      Agree with above  Changes made to note    Jeanna Nava MD,2/26/2022 9:21 AM

## 2022-02-26 NOTE — PROGRESS NOTES
Report given @ bedside to [facility], for transferral of care. Pt resting in bed. Sharlene Blanco RN aware of need for 4 eyes and head to toe assessment. Call light in reach.

## 2022-02-26 NOTE — PROGRESS NOTES
4 Eyes Skin Assessment     The patient is being assess for   Admission    I agree that 2 RN's have performed a thorough Head to Toe Skin Assessment on the patient. ALL assessment sites listed below have been assessed. Areas assessed for pressure by both nurses:   [x]   Head, Face, and Ears   [x]   Shoulders, Back, and Chest, Abdomen  [x]   Arms, Elbows, and Hands   [x]   Coccyx, Sacrum, and Ischium  [x]   Legs, Feet, and Heels  Scattered bruising noted. No open areas. Skin Assessed Under all Medical Devices by both nurses:  n/a              All Mepilex Borders were peeled back and area peeked at by both nurses:  No: n/a  Please list where Mepilex Borders are located:  none             **SHARE this note so that the co-signing nurse is able to place an eSignature**    Co-signer eSignature: Electronically signed by Teena Lujan RN on 2/25/22 at 9:29 PM EST    Does the Patient have Skin Breakdown related to pressure?   No              [place] Prevention initiated:  No   Wound Care Orders initiated:  No      [facility] nurse consulted for Pressure Injury (Stage 3,4, Unstageable, DTI, NWPT, Complex wounds)and New or Established Ostomies:  NA      Primary Nurse eSignature: Electronically signed by Yamilet Turner RN on 2/25/22 at 9:20 PM EST

## 2022-02-26 NOTE — PROGRESS NOTES
Patient lying in bed sleeping respirations easy and even board updated in room IV continues to infuse to left upper arm site unremarkable

## 2022-02-27 LAB
ANION GAP SERPL CALCULATED.3IONS-SCNC: 9 MMOL/L (ref 3–16)
BASOPHILS ABSOLUTE: 0 K/UL (ref 0–0.2)
BASOPHILS RELATIVE PERCENT: 0.4 %
BUN BLDV-MCNC: 11 MG/DL (ref 7–20)
CALCIUM SERPL-MCNC: 8 MG/DL (ref 8.3–10.6)
CHLORIDE BLD-SCNC: 112 MMOL/L (ref 99–110)
CO2: 22 MMOL/L (ref 21–32)
CREAT SERPL-MCNC: 0.9 MG/DL (ref 0.8–1.3)
EOSINOPHILS ABSOLUTE: 0.2 K/UL (ref 0–0.6)
EOSINOPHILS RELATIVE PERCENT: 2.9 %
GFR AFRICAN AMERICAN: >60
GFR NON-AFRICAN AMERICAN: >60
GLUCOSE BLD-MCNC: 95 MG/DL (ref 70–99)
HCT VFR BLD CALC: 29.4 % (ref 40.5–52.5)
HCT VFR BLD CALC: 31.8 % (ref 40.5–52.5)
HEMOGLOBIN: 10.7 G/DL (ref 13.5–17.5)
HEMOGLOBIN: 9.7 G/DL (ref 13.5–17.5)
LYMPHOCYTES ABSOLUTE: 1.4 K/UL (ref 1–5.1)
LYMPHOCYTES RELATIVE PERCENT: 21.3 %
MCH RBC QN AUTO: 28.4 PG (ref 26–34)
MCHC RBC AUTO-ENTMCNC: 33 G/DL (ref 31–36)
MCV RBC AUTO: 85.9 FL (ref 80–100)
MONOCYTES ABSOLUTE: 0.5 K/UL (ref 0–1.3)
MONOCYTES RELATIVE PERCENT: 8.6 %
NEUTROPHILS ABSOLUTE: 4.2 K/UL (ref 1.7–7.7)
NEUTROPHILS RELATIVE PERCENT: 66.8 %
PDW BLD-RTO: 15.9 % (ref 12.4–15.4)
PLATELET # BLD: 202 K/UL (ref 135–450)
PMV BLD AUTO: 7 FL (ref 5–10.5)
POTASSIUM REFLEX MAGNESIUM: 3.8 MMOL/L (ref 3.5–5.1)
RBC # BLD: 3.42 M/UL (ref 4.2–5.9)
SODIUM BLD-SCNC: 143 MMOL/L (ref 136–145)
WBC # BLD: 6.4 K/UL (ref 4–11)

## 2022-02-27 PROCEDURE — 6370000000 HC RX 637 (ALT 250 FOR IP): Performed by: INTERNAL MEDICINE

## 2022-02-27 PROCEDURE — 99153 MOD SED SAME PHYS/QHP EA: CPT | Performed by: INTERNAL MEDICINE

## 2022-02-27 PROCEDURE — 2580000003 HC RX 258: Performed by: PHYSICIAN ASSISTANT

## 2022-02-27 PROCEDURE — 6360000002 HC RX W HCPCS: Performed by: PHYSICIAN ASSISTANT

## 2022-02-27 PROCEDURE — 3609010300 HC COLONOSCOPY W/BIOPSY SINGLE/MULTIPLE: Performed by: INTERNAL MEDICINE

## 2022-02-27 PROCEDURE — 80048 BASIC METABOLIC PNL TOTAL CA: CPT

## 2022-02-27 PROCEDURE — 2709999900 HC NON-CHARGEABLE SUPPLY: Performed by: INTERNAL MEDICINE

## 2022-02-27 PROCEDURE — 36415 COLL VENOUS BLD VENIPUNCTURE: CPT

## 2022-02-27 PROCEDURE — 3609010600 HC COLONOSCOPY POLYPECTOMY SNARE/COLD BIOPSY: Performed by: INTERNAL MEDICINE

## 2022-02-27 PROCEDURE — 88305 TISSUE EXAM BY PATHOLOGIST: CPT

## 2022-02-27 PROCEDURE — 7100000011 HC PHASE II RECOVERY - ADDTL 15 MIN: Performed by: INTERNAL MEDICINE

## 2022-02-27 PROCEDURE — 99232 SBSQ HOSP IP/OBS MODERATE 35: CPT | Performed by: INTERNAL MEDICINE

## 2022-02-27 PROCEDURE — 1200000000 HC SEMI PRIVATE

## 2022-02-27 PROCEDURE — 85025 COMPLETE CBC W/AUTO DIFF WBC: CPT

## 2022-02-27 PROCEDURE — 0DBH8ZZ EXCISION OF CECUM, VIA NATURAL OR ARTIFICIAL OPENING ENDOSCOPIC: ICD-10-PCS | Performed by: INTERNAL MEDICINE

## 2022-02-27 PROCEDURE — 99152 MOD SED SAME PHYS/QHP 5/>YRS: CPT | Performed by: INTERNAL MEDICINE

## 2022-02-27 PROCEDURE — 7100000010 HC PHASE II RECOVERY - FIRST 15 MIN: Performed by: INTERNAL MEDICINE

## 2022-02-27 PROCEDURE — 6360000002 HC RX W HCPCS: Performed by: INTERNAL MEDICINE

## 2022-02-27 PROCEDURE — C9113 INJ PANTOPRAZOLE SODIUM, VIA: HCPCS | Performed by: PHYSICIAN ASSISTANT

## 2022-02-27 RX ORDER — FENTANYL CITRATE 50 UG/ML
INJECTION, SOLUTION INTRAMUSCULAR; INTRAVENOUS PRN
Status: DISCONTINUED | OUTPATIENT
Start: 2022-02-27 | End: 2022-02-27 | Stop reason: ALTCHOICE

## 2022-02-27 RX ORDER — MIDAZOLAM HYDROCHLORIDE 5 MG/ML
INJECTION INTRAMUSCULAR; INTRAVENOUS PRN
Status: DISCONTINUED | OUTPATIENT
Start: 2022-02-27 | End: 2022-02-27 | Stop reason: ALTCHOICE

## 2022-02-27 RX ADMIN — Medication 10 ML: at 09:58

## 2022-02-27 RX ADMIN — AMLODIPINE BESYLATE 5 MG: 5 TABLET ORAL at 09:57

## 2022-02-27 RX ADMIN — TAMSULOSIN HYDROCHLORIDE 0.4 MG: 0.4 CAPSULE ORAL at 09:57

## 2022-02-27 RX ADMIN — PANTOPRAZOLE SODIUM 40 MG: 40 INJECTION, POWDER, FOR SOLUTION INTRAVENOUS at 16:57

## 2022-02-27 RX ADMIN — Medication 10 ML: at 16:57

## 2022-02-27 RX ADMIN — Medication 10 ML: at 05:16

## 2022-02-27 RX ADMIN — PANTOPRAZOLE SODIUM 40 MG: 40 INJECTION, POWDER, FOR SOLUTION INTRAVENOUS at 05:16

## 2022-02-27 RX ADMIN — Medication 10 ML: at 09:57

## 2022-02-27 ASSESSMENT — PAIN SCALES - GENERAL
PAINLEVEL_OUTOF10: 0
PAINLEVEL_OUTOF10: 0

## 2022-02-27 ASSESSMENT — PAIN - FUNCTIONAL ASSESSMENT: PAIN_FUNCTIONAL_ASSESSMENT: 0-10

## 2022-02-27 NOTE — CARE COORDINATION
Case Management Assessment  Initial Evaluation      Patient Name: Vandana Lantigua  YOB: 1931  Diagnosis: GI bleed [K92.2]  Date / Time: 2/25/2022  5:34 PM    Admission status/Date: 02/25/2022 inpatient   Chart Reviewed: Yes      Patient Interviewed: Yes   Family Interviewed:  No      Hospitalization in the last 30 days:  No    Health Care Decision Maker : he states his son Merced Rosenthal has copies; requested copies and he stated understanding    Met with: pt  Interview conducted  (bedside/phone): phone    Current PCP: Shun Hernandez in [employer] and [address] [place] [address] required for SNF : N          3 night stay required -  [facility]  Support Systems/Care Needs: Children  Transportation: self    Meal Preparation: self    Housing  Living Arrangements: Pt lives at home with his grandson  Steps: 0  Intent for return to present living arrangements: Yes  Identified Issues: [id number] [facility] with [address] : No Agency:(Services)  Type of Home Care Services: None  Passport/Waiver : No  :                      Phone Number:    Passport/Waiver Services: n/a          Durable Medical Equiptment   DME Provider: n/a  Equipment:   Walker___Cane___RTS___ BSC___Shower Chair___Hospital Bed___W/C____Other________  02 at ____Liter(s)---wears(frequency)_______ HHN ___ CPAP___ BiPap___   N/A__x__      Home O2 Use :  No    If No for home O2---if presently on O2 during hospitalization:  No  if yes CM to follow for potential DC O2 need  Informed of need for care provider to bring portable home O2 tank on day of discharge for nursing to connect prior to leaving:   Not Indicated  Verbalized agreement/Understanding:   Not Indicated    Community Service Affiliation  Dialysis:  No    · Agency:  · Location:  · Dialysis Schedule:  · Phone:   · Fax: Other Community Services: n/a    DISCHARGE PLAN: Explained Case Management role/services. Chart review completed. Spoke with pt via phone call. He states he plans on returning home on discharge. He states he used to have home oxygen but there was taken back as he no longer has it. He denied needs for CM at this time. CM will follow. Please notify CM if needs or concerns arise.

## 2022-02-27 NOTE — PLAN OF CARE
Problem: Discharge Planning:  Goal: Discharged to appropriate level of care  Description: Discharged to appropriate level of care  2/26/2022 2038 by Angélica Dalton RN  Outcome: Ongoing  2/26/2022 0850 by Sherrie Grossman RN  Outcome: Ongoing     Problem:  Bowel Function - Altered:  Goal: Bowel elimination is within specified parameters  Description: Bowel elimination is within specified parameters  2/26/2022 2038 by Angélica Dalton RN  Outcome: Ongoing  2/26/2022 0850 by Sherrie Grossman RN  Outcome: Ongoing     Problem: Fluid Volume - Imbalance:  Goal: Will show no signs and symptoms of excessive bleeding  Description: Will show no signs and symptoms of excessive bleeding  2/26/2022 2038 by Angélica Dalton RN  Outcome: Ongoing  2/26/2022 0850 by Sherrie Grossman RN  Outcome: Ongoing  Goal: Absence of imbalanced fluid volume signs and symptoms  Description: Absence of imbalanced fluid volume signs and symptoms  2/26/2022 2038 by Angélica Dalton RN  Outcome: Ongoing  2/26/2022 0850 by Sherrie Grossman RN  Outcome: Ongoing     Problem: Nausea/Vomiting:  Goal: Absence of nausea/vomiting  Description: Absence of nausea/vomiting  2/26/2022 2038 by Angélica Dalton RN  Outcome: Ongoing  2/26/2022 0850 by Sherrie Grossman RN  Outcome: Ongoing  Goal: Able to drink  Description: Able to drink  2/26/2022 2038 by Angélica Dalton RN  Outcome: Ongoing  2/26/2022 0850 by Sherrie Grossman RN  Outcome: Ongoing  Goal: Able to eat  Description: Able to eat  2/26/2022 2038 by Angélica Dalton RN  Outcome: Ongoing  2/26/2022 0850 by Sherrie Grossman RN  Outcome: Ongoing  Goal: Ability to achieve adequate nutritional intake will improve  Description: Ability to achieve adequate nutritional intake will improve  2/26/2022 2038 by Angélica Dalton RN  Outcome: Ongoing  2/26/2022 0850 by Sherrie Grossman RN  Outcome: Ongoing

## 2022-02-27 NOTE — PROGRESS NOTES
Progress Note    Admit Date:  2/25/2022    89M with recent TIA started on plavix 3-4 weeks ago in addition to long time use of aspirin presented to [facility] ER with rectal bleeding    Subjective:  Mr. Awilda Case has no complaints today  Feels fine, no further bleeding      Objective:   No data found. Intake/Output Summary (Last 24 hours) at 2/27/2022 0755  Last data filed at 2/26/2022 1744  Gross per 24 hour   Intake --   Output 650 ml   Net -650 ml       Physical Exam:      General: elderly male up in bed  Awake, alert and oriented. Appears to be not in any distress  Mucous Membranes:  Pink , anicteric  Neck: No JVD, no carotid bruit, no thyromegaly  Chest:  Clear to auscultation bilaterally, no added sounds  Cardiovascular:  RRR S1S2 heard, no murmurs or gallops  Abdomen:  Soft, undistended, non tender, no organomegaly, BS present  Extremities: No edema or cyanosis. Distal pulses well felt  Neurological : grossly normal with severe [place]          Scheduled Meds:   sodium chloride flush  5-40 mL IntraVENous 2 times per day    pantoprazole  40 mg IntraVENous Q12H    And    sodium chloride (PF)  10 mL IntraVENous Q12H    amLODIPine  5 mg Oral Daily    tamsulosin  0.4 mg Oral Daily       Continuous Infusions:   sodium chloride      sodium chloride 75 mL/hr at 02/26/22 0848       PRN Meds:  sodium chloride flush, sodium chloride, ondansetron **OR** ondansetron, acetaminophen **OR** acetaminophen      Data:  CBC:   Recent Labs     02/25/22  1749 02/25/22  2342 02/26/22  0510 02/26/22  1229 02/26/22  1740 02/26/22  2350 02/27/22  0508   WBC 6.9  --  6.3  --   --   --  6.4   HGB 11.8*   < > 9.9*   < > 9.8* 10.7* 9.7*   HCT 34.9*   < > 29.5*   < > 29.0* 31.8* 29.4*   MCV 85.6  --  85.7  --   --   --  85.9     --  199  --   --   --  202    < > = values in this interval not displayed.      BMP:   Recent Labs     02/25/22  1749 02/26/22  0510 02/27/22  0508    140 143   K 4.2 3.8 3.8   * 111* 112*   CO2 23 21 22   BUN 14 14 11   CREATININE 0.8 0.9 0.9     LIVER PROFILE: No results for input(s): AST, ALT, LIPASE, BILIDIR, BILITOT, ALKPHOS in the last 72 hours. Invalid input(s): AMYLASE,  ALB  PT/INR: No results for input(s): PROTIME, INR in the last 72 hours. EGD  · Erosive gastritis  · Duodenitis    Colonoscopy     · few specks of old blood seen in the Left colon. No active bleeding seen. Most likely rectal bleeding from left sided diverticular bleed which has stopped. Assessment/Plan:    #Acute GI Bleed-  diverticular bleed    - presents with maroon colored blood per rectum, no prior h/o GI bleed. - Seen by GI- EGD with no source. Colonoscopy with old blood in left colon  - likely diverticular bleed  - holding antiplatelets- plavix - this was started 3 weeks ago  - recommend to stop plavix and continue ASA at dc   - monitor h/h tonight and dc home in am    #Acute blood loss anemia  - Hb 12 in the ER-> 9.8 today  - monitor h.h and transfuse for hb<7    #Recent TIA  -patient reports COVID 19 illness >4 weeks ago, was started on plavix 3-4 weeks ago with concern for TIA. He also has been on ASA for several years.   No prior history of CVA or CAD  - holding antiplatelets- will dc plavix at this time     #BPH  - cont flomax    #HTN  - BP stable- cont flomax     DVT Prophylaxis: SCD  Diet: ADULT DIET; Easy to Chew  Code Status: Full Code      Jolynn Polk MD,2/27/2022 7:55 AM

## 2022-02-27 NOTE — PROCEDURES
Colonoscopy Procedure Note          Patient: Gaby Saunders  : 1931  Acct#:     Procedure: Colonoscopy with biopsy, polypectomy (cold snare)    Date:  2022    Surgeon:  Sabino Renee MD, MD    Referring Physician:  Michael Bella MD      Preoperative Diagnosis: Rectal bleeding      Consent:  The patient or their legal guardian has signed a consent, and is aware of the potential risks, benefits, alternatives, and potential complications of this procedure. These include, but are not limited to hemorrhage, bleeding, post procedural pain, perforation, phlebitis, aspiration, hypotension, hypoxia, cardiovascular events such as arryhthmia, and possibly death. Additionally, the possibility of missed colonic polyps and interval colon cancer was discussed in the consent. Anesthesia:  Versed 2 mg IV, fentanyl 50 mcg IV          Procedure description:   An informed consent was obtained from the patient after explanation of indications, benefits, possible risks and complications of the procedure. The patient was then taken to the endoscopy suite, placed in the left lateral decubitus position, and the above IV anesthesia was administered. A digital rectal examination was performed and revealed negative without mass, lesions or tenderness. The Olympus video colonoscope was placed in the patient's rectum under digital direction and advanced to the cecum. The cecum was identified by IC valve and appendiceal orifice. The prep was fair. The ileocecal valve was identified and  intubated. The scope was then withdrawn back through the cecum, ascending, transverse, descending and sigmoid colons. Carefull circumferential examination of the mucosa was performed. The scope was then withdrawn into the rectum and retroflexed. The scope was straightened, the colon was decompressed and the scope was withdrawn from the patient.   The patient tolerated the procedure well and was taken to the PACU in good condition. EBL: NONE    Findings:    The prep was fair, at few places it was suboptimal which washed and suctioned. 2 polyps in the cecum 5- 6 mm in size, removed by cold snare. 5 mm polyp in the ascending colon, removed by cold snare. 3 mm polyp in the rectum, removed by cold biopsy. Multiple large diverticula seen in the Left colon and few diverticula seen in the right colon. Few specks of old blood seen in the Left colon. Retroflexion in the rectum revealed small internal hemorrhoids. Impression:      The prep was fair, at few places it was suboptimal which washed and suctioned. 2 polyps in the cecum 5 mm - 6 mm in size, removed by cold snare. 5 mm polyp in the ascending colon, removed by cold snare. 3 mm polyp in the rectum, removed by cold biopsy. Multiple large diverticula seen in the Left colon and few diverticula seen in the right colon. Few specks of old blood seen in the Left colon. No active bleeding seen. Most likely rectal bleeding from left sided diverticular bleed which has stopped. Recommendations:     Follow up pathology results  Advance diet as tolerated  Follow up Hb/Hct  If Hb remains stable can be discharged home and follow up with us as outpatient  Recommend fiber supplements daily and miralax as needed on discharge        Sabino Solis MD,  [facility]  2/27/2022

## 2022-02-27 NOTE — PROGRESS NOTES
Report given to endo nurse patient down for colonoscopy at this time Dr. Yee Krause notified that patient did not finish bowel prep last night and stools are now liquid stated he will do scope anyway and see if they could get results from scope

## 2022-02-27 NOTE — PROGRESS NOTES
BP (!) 142/56   Pulse 86   Temp 97.1 °F (36.2 °C) (Oral)   Resp 18   Ht 5' 8\" (1.727 m)   Wt 204 lb (92.5 kg)   SpO2 97%   BMI 31.02 kg/m²   Patient lying in bed offers no c/o denies pain/discomfort at this time IV site to Left upper arm unremarkable saline locked

## 2022-02-28 VITALS
SYSTOLIC BLOOD PRESSURE: 125 MMHG | TEMPERATURE: 97.9 F | HEART RATE: 72 BPM | BODY MASS INDEX: 30.92 KG/M2 | WEIGHT: 204 LBS | RESPIRATION RATE: 17 BRPM | OXYGEN SATURATION: 94 % | HEIGHT: 68 IN | DIASTOLIC BLOOD PRESSURE: 54 MMHG

## 2022-02-28 PROCEDURE — 2580000003 HC RX 258: Performed by: PHYSICIAN ASSISTANT

## 2022-02-28 PROCEDURE — C9113 INJ PANTOPRAZOLE SODIUM, VIA: HCPCS | Performed by: PHYSICIAN ASSISTANT

## 2022-02-28 PROCEDURE — 6370000000 HC RX 637 (ALT 250 FOR IP): Performed by: PHYSICIAN ASSISTANT

## 2022-02-28 PROCEDURE — 6370000000 HC RX 637 (ALT 250 FOR IP): Performed by: INTERNAL MEDICINE

## 2022-02-28 PROCEDURE — 99238 HOSP IP/OBS DSCHRG MGMT 30/<: CPT | Performed by: INTERNAL MEDICINE

## 2022-02-28 PROCEDURE — 6360000002 HC RX W HCPCS: Performed by: PHYSICIAN ASSISTANT

## 2022-02-28 RX ORDER — PANTOPRAZOLE SODIUM 40 MG/1
40 TABLET, DELAYED RELEASE ORAL
Qty: 60 TABLET | Refills: 1 | Status: SHIPPED | OUTPATIENT
Start: 2022-02-28

## 2022-02-28 RX ADMIN — Medication 10 ML: at 06:04

## 2022-02-28 RX ADMIN — PANTOPRAZOLE SODIUM 40 MG: 40 INJECTION, POWDER, FOR SOLUTION INTRAVENOUS at 06:03

## 2022-02-28 RX ADMIN — ACETAMINOPHEN 650 MG: 325 TABLET ORAL at 02:31

## 2022-02-28 RX ADMIN — TAMSULOSIN HYDROCHLORIDE 0.4 MG: 0.4 CAPSULE ORAL at 09:54

## 2022-02-28 RX ADMIN — Medication 10 ML: at 09:58

## 2022-02-28 RX ADMIN — AMLODIPINE BESYLATE 5 MG: 5 TABLET ORAL at 09:54

## 2022-02-28 ASSESSMENT — PAIN SCALES - GENERAL
PAINLEVEL_OUTOF10: 3
PAINLEVEL_OUTOF10: 0

## 2022-02-28 NOTE — PROGRESS NOTES
BP (!) 125/52   Pulse 73   Temp 97.8 °F (36.6 °C) (Oral)   Resp 16   Ht 5' 8\" (1.727 m)   Wt 204 lb (92.5 kg)   SpO2 97%   BMI 31.02 kg/m²     Assessment complete. Meds passed. Pt denies needs at this time        Bedside Mobility Assessment Tool (BMAT):     Assessment Level 1- Sit and Shake    1. From a semi-reclined position, ask patient to sit up and rotate to a seated position at the side of the bed. Can use the bedrail. 2. Ask patient to reach out and grab your hand and shake making sure patient reaches across his/her midline. Pass- Patient is able to come to a seated position, maintain core strength. Maintains seated balance while reaching across midline. Move on to Assessment Level 2. Assessment Level 2- Stretch and Point   1. With patient in seated position at the side of the bed, have patient place both feet on the floor (or stool) with knees no higher than hips. 2. Ask patient to stretch one leg and straighten the knee, then bend the ankle/flex and point the toes. If appropriate, repeat with the other leg. Pass- Patient is able to demonstrate appropriate quad strength on intended weight bearing limb(s). Move onto Assessment Level 3. Assessment Level 3- Stand   1. Ask patient to elevate off the bed or chair (seated to standing) using an assistive device (cane, bedrail). 2. Patient should be able to raise buttocks off be and hold for a count of five. May repeat once. Pass- Patient maintains standing stability for at least 5 seconds, proceed to assessment level 4. Assessment Level 4- Walk   1. Ask patient to march in place at bedside. 2. Then ask patient to advance step and return each foot. Some medical conditions may render a patient from stepping backwards, use your best clinical judgement. Pass- Patient demonstrates balance while shifting weight and ability to step, takes independent steps, does not use assistive device patient is MOBILITY LEVEL 4.       Mobility Level- 4

## 2022-02-28 NOTE — CARE COORDINATION
DISCHARGE ORDER  Date/Time 2022 2:30 PM  Completed by: Alyson Mcfarland RN, Case Management    Patient Name: Rian Wilhelm      : 1931  Admitting Diagnosis: GI bleed [K92.2]      Admit order Date and Status: IP 2022  (verify MD's last order for status of admission)      Noted discharge order. Discharge Plan: DC order noted. No DCP needs IPTA. Will DC home with grandson today. Has Home O2 in place on admit:  No  Informed of need to bring portable home O2 tank on day of discharge for nursing to connect prior to leaving:   Not Indicated  Verbalized agreement/Understanding:   Not Indicated  Pt is being d/c'd to home today. Pt's O2 sats are 94% on RA. Discharge timeout done with Jairo Matt. All discharge needs and concerns addressed.

## 2022-02-28 NOTE — PROGRESS NOTES
Progress Note    Patient Sidney Childress  MRN: [id number]  YOB: 1931 Age: 80 y.o. Sex: male  Room: [address] [place] [address]       Admitting Physician: Miguel Siddiqui MD   Date of Admission: 2/25/2022  5:34 PM   Primary Care Physician: Steve Zhu MD     Subjective:  Sidney Childress was seen and examined. We are following for diverticular bleed. -- No acute events overnight  -- Hgb remains stable  -- BM yesterday without evidence of bleeding    ROS:  Constitutional: Denies fever, no change in appetite  Respiratory: Denies cough or shortness of breath  Cardiovascular: Denies chest pain or edema    Objective:  Vital Signs:   Vitals:    02/28/22 0227   BP: 120/60   Pulse: 75   Resp: 16   Temp: 98.1 °F (36.7 °C)   SpO2: 91%         Physical Exam:  Constitutional: Alert and oriented x 4. No acute distress. Respiratory: Respirations nonlabored, no crepitus  GI: Abdomen nondistended, soft, and nontender. Neurological: No focal deficits noted. No asterixis.     Intake/Output:    Intake/Output Summary (Last 24 hours) at 2/28/2022 0940  Last data filed at 2/28/2022 0817  Gross per 24 hour   Intake 240 ml   Output 400 ml   Net -160 ml        Current Medications:  Current Facility-Administered Medications   Medication Dose Route Frequency Provider Last Rate Last Admin    sodium chloride flush 0.9 % injection 5-40 mL  5-40 mL IntraVENous 2 times per day Gasper Bazzi, PA-C   10 mL at 02/27/22 7937    sodium chloride flush 0.9 % injection 5-40 mL  5-40 mL IntraVENous PRN Gasper Bazzi, PA-C        0.9 % sodium chloride infusion  25 mL IntraVENous PRN Gasper Bazzi, PA-C        ondansetron (ZOFRAN-ODT) disintegrating tablet 4 mg  4 mg Oral Q8H PRN Gasper Bazzi, PA-C        Or    ondansetron [facility]) injection 4 mg  4 mg IntraVENous Q6H PRN Gasper Bazzi, PA-C   4 mg at 02/26/22 2030    acetaminophen (TYLENOL) tablet 650 mg  650 mg Oral Q6H PRN Gasper Bazzi, PA-C   650 mg at 02/28/22 0231    Or    acetaminophen (TYLENOL) suppository 650 mg  650 mg Rectal Q6H PRN Kimi Steele PA-C        pantoprazole (PROTONIX) injection 40 mg  40 mg IntraVENous Q12H Kimi Steele PA-C   40 mg at 02/28/22 2058    And    sodium chloride (PF) 0.9 % injection 10 mL  10 mL IntraVENous Q12H Trudy Moore PA-C   10 mL at 02/28/22 0604    amLODIPine (NORVASC) tablet 5 mg  5 mg Oral Daily King Fernandez MD   5 mg at 02/27/22 0957    tamsulosin (FLOMAX) capsule 0.4 mg  0.4 mg Oral Daily King Fernandez MD   0.4 mg at 02/27/22 0957         Recent labs and imaging reviewed. Assessment:      54-year-old male admitted with rectal bleeding. As per the patient he has been having red blood mixed with black stools which started yesterday morning. Hemoglobin 9.9, on admission it was 11.8. Denies any previous history of GI bleeding. Patient otherwise looks in good general health status. 2/26 EGD: Erosive gastritis. Duodenitis. 2/27 Colonoscopy: fair prep. 2 polyps in the cecum, removed. 5 mm polyp in the ascending colon, removed by cold snare. 3 mm polyp in the rectum, removed by cold biopsy. Multiple large diverticula seen in the left colon and few diverticula seen in the right colon. Few specks of old blood seen in the left colon. No active bleeding seen. Most likely rectal bleeding from left sided diverticular bleed which has stopped. Hgb stable. No overt bleeding reported. Tolerating PO intake. Denies abdominal pain or nausea/vomiting. Plan:  1. Monitor H/H and overt signs of GI bleeding  2. Okay to resume Plavix in 5 days if needed  3. Avoid NSAIDs  4. Follow up pathology. Will call patient once results available  5. GI will sign off. Please call with questions or concerns. Kj Garcia, APRN - CNP    [facility]    [phone].  Also available via [employer]

## 2022-02-28 NOTE — DISCHARGE SUMMARY
Name:  Zelda Best  Room:  0217/0217-02  MRN:    [id number]    Discharge Summary      This discharge summary is in conjunction with a complete physical exam done on the day of discharge. Discharging Physician: Abhishek Johnson MD      Admit: 2/25/2022  Discharge:   2/28/2022     Diagnoses this Admission    Principal Problem:    GI bleed  Active Problems:    Benign essential HTN    Acute blood loss anemia  Resolved Problems:    * No resolved hospital problems. *        Procedures (Please Review Full Report for Details)  Colonoscopy with biopsy, polypectomy (cold snare)  Esophagogastroduodenoscopy with biopsy    Consults    IP CONSULT TO GI      HPI:      80 y.o. male who presented to the hospital from [facility] [place] emergency department with a chief complaint rectal hemorrhage. Patient states he woke up this morning, when he used the bathroom he found blood on the toilet. After that he has had for episodes of bloody bowel movements all today. He denies any abdominal pain, nausea, no vomiting. He has never had anything like this in the past.  The patient recently had a TIA and was started on Plavix. He was transferred for GI evaluation. Physical Exam at Discharge:  BP (!) 125/52   Pulse 73   Temp 97.8 °F (36.6 °C) (Oral)   Resp 16   Ht 5' 8\" (1.727 m)   Wt 204 lb (92.5 kg)   SpO2 97%   BMI 31.02 kg/m²     General: elderly male up in bed  Awake, alert and oriented. Appears to be not in any distress  Mucous Membranes:  Pink , anicteric  Neck: No JVD, no carotid bruit, no thyromegaly  Chest:  Clear to auscultation bilaterally, no added sounds  Cardiovascular:  RRR S1S2 heard, no murmurs or gallops  Abdomen:  Soft, undistended, non tender, no organomegaly, BS present  Extremities: No edema or cyanosis.  Distal pulses well felt  Neurological : grossly normal with severe [facility]  #Acute GI Bleed-  diverticular bleed   - presents with maroon colored blood per rectum, no prior h/o GI bleed. - Seen by GI- EGD with no source. Colonoscopy with old blood in left colon  - likely diverticular bleed  - holding antiplatelets- plavix - this was started 3 weeks ago  - recommend to stop plavix and continue ASA at dc   - monitored h/h and dc home today.     #Acute blood loss anemia  - Hgb 12 in the ER-> 9.7 today  - monitored h.h and transfuse for hb<7     #Recent TIA  -patient reports COVID 19 illness >4 weeks ago, was started on plavix 3-4 weeks ago with concern for TIA. He also has been on ASA for several years. No prior history of CVA or CAD  - holding antiplatelets- will dc plavix at this time      #BPH  - cont'd flomax     #HTN  - BP stable- cont flomax      DVT Prophylaxis: SCD    CBC:   Recent Labs     02/25/22  1749 02/25/22  2342 02/26/22  0510 02/26/22  1229 02/26/22  1740 02/26/22  2350 02/27/22  0508   WBC 6.9  --  6.3  --   --   --  6.4   HGB 11.8*   < > 9.9*   < > 9.8* 10.7* 9.7*   HCT 34.9*   < > 29.5*   < > 29.0* 31.8* 29.4*   MCV 85.6  --  85.7  --   --   --  85.9     --  199  --   --   --  202    < > = values in this interval not displayed. BMP:   Recent Labs     02/25/22  1749 02/26/22  0510 02/27/22  0508    140 143   K 4.2 3.8 3.8   * 111* 112*   CO2 23 21 22   BUN 14 14 11   CREATININE 0.8 0.9 0.9     LIVER PROFILE: No results for input(s): AST, ALT, LIPASE, BILIDIR, BILITOT, ALKPHOS in the last 72 hours. Invalid input(s): AMYLASE,  ALB  PT/INR: No results for input(s): PROTIME, INR in the last 72 hours. APTT: No results for input(s): APTT in the last 72 hours. UA:No results for input(s): NITRITE, COLORU, PHUR, LABCAST, WBCUA, RBCUA, MUCUS, TRICHOMONAS, YEAST, BACTERIA, CLARITYU, SPECGRAV, LEUKOCYTESUR, UROBILINOGEN, BILIRUBINUR, BLOODU, GLUCOSEU, AMORPHOUS in the last 72 hours.     Invalid input(s): KETONESU      Cultures  N/A    Radiology  N/A    Discharge Medications     Medication List      START taking these medications    pantoprazole 40 MG tablet  Commonly known as: PROTONIX  Take 1 tablet by mouth 2 times daily (before meals)        CONTINUE taking these medications    aspirin 81 MG EC tablet     NORVASC PO     senna 8.6 MG tablet  Commonly known as: SENOKOT     tamsulosin 0.4 MG capsule  Commonly known as: FLOMAX        STOP taking these medications    clopidogrel 75 MG tablet  Commonly known as: PLAVIX           Where to Get Your Medications      These medications were sent to [address] [address]    Phone: [phone]   · pantoprazole 40 MG tablet           Discharge Condition/Location: Stable    Follow Up: Follow up with PCP.         Anayeli Warren MD 2/28/2022 2:03 PM

## 2022-02-28 NOTE — FLOWSHEET NOTE
02/25/22 2015   Vital Signs   Temp 97.7 °F (36.5 °C)   Temp Source Oral   Pulse 74   Resp 16   BP (!) 156/62   BP Location Right upper arm   Level of Consciousness Alert (0)   MEWS Score 1   Oxygen Therapy   SpO2 96 %   O2 Device None (Room air)   Pt awake/alert and oriented times 4.  Denies pain or n/v. Pedro Mora, RN
02/26/22 0345   Vital Signs   Temp 97.9 °F (36.6 °C)   Temp Source Oral   Pulse 84   Resp 16   /61   BP Location Right upper arm   Level of Consciousness Alert (0)   MEWS Score 1   Oxygen Therapy   SpO2 97 %   O2 Device None (Room air)   Pt resting in bed, remains NPO for possible EGD. Denies pain.  Sven Duffy, RN
02/27/22 1957   Vital Signs   Temp 97.4 °F (36.3 °C)   Temp Source Oral   Pulse 72   Resp 16   /63   BP Location Right upper arm   Level of Consciousness Alert (0)   MEWS Score 1   Oxygen Therapy   SpO2 91 %   O2 Device None (Room air)   Pt awake/alert and oriented times 4. Denies pain. No bleeding noted.  Brendon Jones, RN
02/28/22 0227   Vital Signs   Temp 98.1 °F (36.7 °C)   Temp Source Oral   Pulse 75   Heart Rate Source Monitor   Resp 16   /60   BP Location Right upper arm   Patient Position Semi fowlers   Level of Consciousness Alert (0)   MEWS Score 1   Oxygen Therapy   SpO2 91 %   O2 Device None (Room air)   Pt resting in bed, no acute distress. Pt c/o bilat leg pain \"arthritis\". Tylenol 2 po given.   Breanne Ross RN
Covid Vaccination card scan into media tab for verification.
present

## 2023-08-03 ENCOUNTER — HOSPITAL ENCOUNTER (EMERGENCY)
Age: 88
Discharge: HOME | End: 2023-08-03
Attending: EMERGENCY MEDICINE
Payer: MEDICARE

## 2023-08-03 VITALS
RESPIRATION RATE: 20 BRPM | BODY MASS INDEX: 32.96 KG/M2 | HEIGHT: 67 IN | DIASTOLIC BLOOD PRESSURE: 80 MMHG | TEMPERATURE: 98.8 F | WEIGHT: 210 LBS | SYSTOLIC BLOOD PRESSURE: 186 MMHG | HEART RATE: 72 BPM | OXYGEN SATURATION: 98 %

## 2023-08-03 DIAGNOSIS — T63.441A BEE STING, ACCIDENTAL OR UNINTENTIONAL, INITIAL ENCOUNTER: Primary | ICD-10-CM

## 2023-08-03 PROCEDURE — 99283 EMERGENCY DEPT VISIT LOW MDM: CPT

## 2023-08-03 PROCEDURE — 6370000000 HC RX 637 (ALT 250 FOR IP): Performed by: EMERGENCY MEDICINE

## 2023-08-03 RX ORDER — ACETAMINOPHEN 325 MG/1
650 TABLET ORAL ONCE
Status: COMPLETED | OUTPATIENT
Start: 2023-08-03 | End: 2023-08-03

## 2023-08-03 RX ORDER — ACETAMINOPHEN 325 MG/1
650 TABLET ORAL EVERY 4 HOURS PRN
Qty: 120 TABLET | Refills: 3 | Status: SHIPPED | OUTPATIENT
Start: 2023-08-03

## 2023-08-03 RX ORDER — CETIRIZINE HYDROCHLORIDE 10 MG/1
5 TABLET ORAL ONCE
Status: DISCONTINUED | OUTPATIENT
Start: 2023-08-03 | End: 2023-08-03

## 2023-08-03 RX ORDER — LORATADINE 10 MG/1
10 TABLET ORAL DAILY
Qty: 15 TABLET | Refills: 0 | Status: SHIPPED | OUTPATIENT
Start: 2023-08-03 | End: 2023-08-18

## 2023-08-03 RX ADMIN — ACETAMINOPHEN 650 MG: 325 TABLET ORAL at 15:19

## 2023-08-03 ASSESSMENT — PAIN - FUNCTIONAL ASSESSMENT
PAIN_FUNCTIONAL_ASSESSMENT: ACTIVITIES ARE NOT PREVENTED
PAIN_FUNCTIONAL_ASSESSMENT: 0-10

## 2023-08-03 ASSESSMENT — PAIN DESCRIPTION - ORIENTATION: ORIENTATION: RIGHT;LEFT

## 2023-08-03 ASSESSMENT — PAIN DESCRIPTION - PAIN TYPE: TYPE: ACUTE PAIN

## 2023-08-03 ASSESSMENT — PAIN DESCRIPTION - ONSET: ONSET: SUDDEN

## 2023-08-03 ASSESSMENT — LIFESTYLE VARIABLES
HOW MANY STANDARD DRINKS CONTAINING ALCOHOL DO YOU HAVE ON A TYPICAL DAY: PATIENT DOES NOT DRINK
HOW OFTEN DO YOU HAVE A DRINK CONTAINING ALCOHOL: NEVER

## 2023-08-03 ASSESSMENT — PAIN DESCRIPTION - FREQUENCY: FREQUENCY: CONTINUOUS

## 2023-08-03 ASSESSMENT — PAIN DESCRIPTION - DESCRIPTORS: DESCRIPTORS: THROBBING

## 2023-08-03 ASSESSMENT — PAIN DESCRIPTION - LOCATION: LOCATION: ARM;HEAD;NECK

## 2023-08-03 NOTE — ED PROVIDER NOTES
[address] [facility]      Pt Name: Lona Contreras  MRN: [id number]  [address] [facility] [place] 5/21/1931  Date of evaluation: 8/3/2023  Provider: Alexis Rios MD    CHIEF COMPLAINT       Chief Complaint   Patient presents with    Insect Bite     Multiple hornet stings at 1330           HISTORY OF PRESENT ILLNESS   (Location/Symptom, Timing/Onset, Context/Setting, Quality, Duration, Modifying Factors, Severity)  Note limiting factors. Lona Contreras is a 80 y.o. male who presents to the emergency department with the chief complaint of   Chief Complaint   Patient presents with    Insect Bite     Multiple hornet stings at [phone]     . 70-year-old male with past medical history as listed below presents to the ER for evaluation status post bee sting. Patient states he was working on the field moving some hay when suddenly he was swarmed by what he thinks was a swarm of bees. Patient states he was stung about the back of his head, neck and arms. The incident occurred at 1:30. Patient states afterwards he had pain at the site of the stings. Patient took Benadryl prior to arrival with mild improvement of symptoms. Patient complains only of pain at the site of the stings. Patient denies skin changes, shortness of breath, nausea, vomiting or lightheadedness. Nursing Notes were reviewed. REVIEW OF SYSTEMS    (2-9 systems for level 4, 10 or more for level 5)     Review of Systems   All other systems reviewed and are negative. Except as noted above the remainder of the review of systems was reviewed and negative.        PAST MEDICAL HISTORY     Past Medical History:   Diagnosis Date    COVID 12/2021    GERD with esophagitis     Hearing loss     [place] (hard of hearing)     Hyperlipidemia     Hypertension     Melanoma ([address])     right elbow    TIA (transient ischemic attack)     Wears dentures     upper/lower         SURGICAL HISTORY       Past Surgical History:   Procedure [facility]  [phone]    Call         DISCHARGE MEDICATIONS:  Discharge Medication List as of 8/3/2023  3:29 PM        START taking these medications    Details   loratadine (CLARITIN) 10 MG tablet Take 1 tablet by mouth daily for 15 doses, Disp-15 tablet, R-0Normal      acetaminophen (AMINOFEN) 325 MG tablet Take 2 tablets by mouth every 4 hours as needed for Pain, Disp-120 tablet, R-3Normal           Controlled Substances Monitoring:     No flowsheet data found. (Please note that portions of this note were completed with a voice recognition program.  Efforts were made to edit the dictations but occasionally words are mis-transcribed. )    Tereso Yates MD (electronically signed)  Attending Emergency Physician            Tereso Yates MD  08/05/23 3911

## 2023-08-03 NOTE — DISCHARGE INSTRUCTIONS
Please take the Claritin daily if you have symptoms related to the insect bite.   If you develop severe shortness of breath, nausea or vomiting please come back to the ER for repeat evaluation

## 2023-08-03 NOTE — ED NOTES
AVS provided and reviewed with the patient and son. Both verbalized understanding of care at home, follow up care, and emergent symptoms to return for. No questions or concerns verbalized at this time. The patient is alert, oriented, stable, and ambulatory out of the department at the time of discharge.        Veronica Rea RN  08/03/23 9249

## 2024-03-21 ENCOUNTER — APPOINTMENT (OUTPATIENT)
Dept: CT IMAGING | Age: 89
DRG: 065 | End: 2024-03-21
Payer: MEDICARE

## 2024-03-21 ENCOUNTER — HOSPITAL ENCOUNTER (INPATIENT)
Age: 89
LOS: 3 days | Discharge: NURSING, REHAB OR LONG-TERM CARE | DRG: 065 | End: 2024-03-24
Attending: EMERGENCY MEDICINE | Admitting: INTERNAL MEDICINE
Payer: MEDICARE

## 2024-03-21 DIAGNOSIS — M79.5 FOREIGN BODY (FB) IN SOFT TISSUE: ICD-10-CM

## 2024-03-21 DIAGNOSIS — I63.9 CEREBROVASCULAR ACCIDENT (CVA), UNSPECIFIED MECHANISM (HCC): Primary | ICD-10-CM

## 2024-03-21 PROBLEM — Z86.73 HX OF TRANSIENT ISCHEMIC ATTACK (TIA): Status: ACTIVE | Noted: 2024-03-21

## 2024-03-21 PROBLEM — E78.5 HYPERLIPIDEMIA: Status: ACTIVE | Noted: 2024-03-21

## 2024-03-21 PROBLEM — R79.89 ELEVATED SERUM CREATININE: Status: ACTIVE | Noted: 2024-03-21

## 2024-03-21 LAB
ALBUMIN SERPL-MCNC: 4.2 G/DL (ref 3.4–5)
ALBUMIN/GLOB SERPL: 1.9 {RATIO} (ref 1.1–2.2)
ALP SERPL-CCNC: 83 U/L (ref 40–129)
ALT SERPL-CCNC: 32 U/L (ref 10–40)
ANION GAP SERPL CALCULATED.3IONS-SCNC: 14 MMOL/L (ref 3–16)
AST SERPL-CCNC: 23 U/L (ref 15–37)
BASOPHILS # BLD: 0.1 K/UL (ref 0–0.2)
BASOPHILS NFR BLD: 1.2 %
BILIRUB SERPL-MCNC: 0.7 MG/DL (ref 0–1)
BILIRUB UR QL STRIP.AUTO: NEGATIVE
BUN SERPL-MCNC: 23 MG/DL (ref 7–20)
CALCIUM SERPL-MCNC: 9.1 MG/DL (ref 8.3–10.6)
CHLORIDE SERPL-SCNC: 104 MMOL/L (ref 99–110)
CLARITY UR: CLEAR
CO2 SERPL-SCNC: 24 MMOL/L (ref 21–32)
COLOR UR: YELLOW
CREAT SERPL-MCNC: 1.3 MG/DL (ref 0.8–1.3)
DEPRECATED RDW RBC AUTO: 15.1 % (ref 12.4–15.4)
EKG ATRIAL RATE: 72 BPM
EKG DIAGNOSIS: NORMAL
EKG P AXIS: 85 DEGREES
EKG P-R INTERVAL: 140 MS
EKG Q-T INTERVAL: 434 MS
EKG QRS DURATION: 88 MS
EKG QTC CALCULATION (BAZETT): 475 MS
EKG R AXIS: 2 DEGREES
EKG T AXIS: 15 DEGREES
EKG VENTRICULAR RATE: 72 BPM
EOSINOPHIL # BLD: 0 K/UL (ref 0–0.6)
EOSINOPHIL NFR BLD: 0.4 %
FLUAV RNA UPPER RESP QL NAA+PROBE: NEGATIVE
FLUBV AG NPH QL: NEGATIVE
GFR SERPLBLD CREATININE-BSD FMLA CKD-EPI: 51 ML/MIN/{1.73_M2}
GLUCOSE BLD-MCNC: 98 MG/DL (ref 70–99)
GLUCOSE SERPL-MCNC: 108 MG/DL (ref 70–99)
GLUCOSE UR STRIP.AUTO-MCNC: NEGATIVE MG/DL
HCT VFR BLD AUTO: 46.8 % (ref 40.5–52.5)
HGB BLD-MCNC: 15.3 G/DL (ref 13.5–17.5)
HGB UR QL STRIP.AUTO: NEGATIVE
INR PPP: 1.02 (ref 0.84–1.16)
KETONES UR STRIP.AUTO-MCNC: 15 MG/DL
LEUKOCYTE ESTERASE UR QL STRIP.AUTO: NEGATIVE
LYMPHOCYTES # BLD: 0.8 K/UL (ref 1–5.1)
LYMPHOCYTES NFR BLD: 6.8 %
MCH RBC QN AUTO: 28.2 PG (ref 26–34)
MCHC RBC AUTO-ENTMCNC: 32.8 G/DL (ref 31–36)
MCV RBC AUTO: 86 FL (ref 80–100)
MONOCYTES # BLD: 0.4 K/UL (ref 0–1.3)
MONOCYTES NFR BLD: 3.6 %
NEUTROPHILS # BLD: 10.2 K/UL (ref 1.7–7.7)
NEUTROPHILS NFR BLD: 88 %
NITRITE UR QL STRIP.AUTO: NEGATIVE
PERFORMED ON: NORMAL
PH UR STRIP.AUTO: 5.5 [PH] (ref 5–8)
PLATELET # BLD AUTO: 251 K/UL (ref 135–450)
PMV BLD AUTO: 7.6 FL (ref 5–10.5)
POTASSIUM SERPL-SCNC: 4 MMOL/L (ref 3.5–5.1)
PROT SERPL-MCNC: 6.4 G/DL (ref 6.4–8.2)
PROT UR STRIP.AUTO-MCNC: NEGATIVE MG/DL
PROTHROMBIN TIME: 13.4 SEC (ref 11.5–14.8)
RBC # BLD AUTO: 5.44 M/UL (ref 4.2–5.9)
SARS-COV-2 RDRP RESP QL NAA+PROBE: NOT DETECTED
SODIUM SERPL-SCNC: 142 MMOL/L (ref 136–145)
SP GR UR STRIP.AUTO: 1.01 (ref 1–1.03)
TROPONIN, HIGH SENSITIVITY: 17 NG/L (ref 0–22)
TROPONIN, HIGH SENSITIVITY: 25 NG/L (ref 0–22)
UA COMPLETE W REFLEX CULTURE PNL UR: ABNORMAL
UA DIPSTICK W REFLEX MICRO PNL UR: ABNORMAL
URN SPEC COLLECT METH UR: ABNORMAL
UROBILINOGEN UR STRIP-ACNC: 1 E.U./DL
WBC # BLD AUTO: 11.6 K/UL (ref 4–11)

## 2024-03-21 PROCEDURE — 81003 URINALYSIS AUTO W/O SCOPE: CPT

## 2024-03-21 PROCEDURE — 87635 SARS-COV-2 COVID-19 AMP PRB: CPT

## 2024-03-21 PROCEDURE — 70450 CT HEAD/BRAIN W/O DYE: CPT

## 2024-03-21 PROCEDURE — 2580000003 HC RX 258: Performed by: EMERGENCY MEDICINE

## 2024-03-21 PROCEDURE — 70498 CT ANGIOGRAPHY NECK: CPT

## 2024-03-21 PROCEDURE — 84484 ASSAY OF TROPONIN QUANT: CPT

## 2024-03-21 PROCEDURE — 87804 INFLUENZA ASSAY W/OPTIC: CPT

## 2024-03-21 PROCEDURE — 36415 COLL VENOUS BLD VENIPUNCTURE: CPT

## 2024-03-21 PROCEDURE — 93306 TTE W/DOPPLER COMPLETE: CPT

## 2024-03-21 PROCEDURE — 6370000000 HC RX 637 (ALT 250 FOR IP)

## 2024-03-21 PROCEDURE — 92526 ORAL FUNCTION THERAPY: CPT

## 2024-03-21 PROCEDURE — 6360000002 HC RX W HCPCS: Performed by: EMERGENCY MEDICINE

## 2024-03-21 PROCEDURE — 6360000002 HC RX W HCPCS: Performed by: NURSE PRACTITIONER

## 2024-03-21 PROCEDURE — 80053 COMPREHEN METABOLIC PANEL: CPT

## 2024-03-21 PROCEDURE — 93010 ELECTROCARDIOGRAM REPORT: CPT | Performed by: INTERNAL MEDICINE

## 2024-03-21 PROCEDURE — 1200000000 HC SEMI PRIVATE

## 2024-03-21 PROCEDURE — 85610 PROTHROMBIN TIME: CPT

## 2024-03-21 PROCEDURE — 6360000004 HC RX CONTRAST MEDICATION: Performed by: EMERGENCY MEDICINE

## 2024-03-21 PROCEDURE — 99285 EMERGENCY DEPT VISIT HI MDM: CPT

## 2024-03-21 PROCEDURE — 99223 1ST HOSP IP/OBS HIGH 75: CPT

## 2024-03-21 PROCEDURE — 6370000000 HC RX 637 (ALT 250 FOR IP): Performed by: NURSE PRACTITIONER

## 2024-03-21 PROCEDURE — 4A03X5D MEASUREMENT OF ARTERIAL FLOW, INTRACRANIAL, EXTERNAL APPROACH: ICD-10-PCS | Performed by: INTERNAL MEDICINE

## 2024-03-21 PROCEDURE — 85025 COMPLETE CBC W/AUTO DIFF WBC: CPT

## 2024-03-21 PROCEDURE — 99223 1ST HOSP IP/OBS HIGH 75: CPT | Performed by: PSYCHIATRY & NEUROLOGY

## 2024-03-21 PROCEDURE — 93005 ELECTROCARDIOGRAM TRACING: CPT | Performed by: EMERGENCY MEDICINE

## 2024-03-21 PROCEDURE — 6370000000 HC RX 637 (ALT 250 FOR IP): Performed by: PSYCHIATRY & NEUROLOGY

## 2024-03-21 PROCEDURE — 92610 EVALUATE SWALLOWING FUNCTION: CPT

## 2024-03-21 RX ORDER — LATANOPROST 50 UG/ML
1 SOLUTION/ DROPS OPHTHALMIC NIGHTLY
Status: DISCONTINUED | OUTPATIENT
Start: 2024-03-21 | End: 2024-03-24 | Stop reason: HOSPADM

## 2024-03-21 RX ORDER — LABETALOL HYDROCHLORIDE 5 MG/ML
10 INJECTION, SOLUTION INTRAVENOUS ONCE
Status: COMPLETED | OUTPATIENT
Start: 2024-03-21 | End: 2024-03-21

## 2024-03-21 RX ORDER — OMEPRAZOLE 20 MG/1
40 CAPSULE, DELAYED RELEASE ORAL DAILY
Status: ON HOLD | COMMUNITY

## 2024-03-21 RX ORDER — ASPIRIN 300 MG/1
300 SUPPOSITORY RECTAL DAILY
Status: DISCONTINUED | OUTPATIENT
Start: 2024-03-21 | End: 2024-03-24 | Stop reason: HOSPADM

## 2024-03-21 RX ORDER — ONDANSETRON 2 MG/ML
4 INJECTION INTRAMUSCULAR; INTRAVENOUS EVERY 6 HOURS PRN
Status: DISCONTINUED | OUTPATIENT
Start: 2024-03-21 | End: 2024-03-24 | Stop reason: HOSPADM

## 2024-03-21 RX ORDER — PANTOPRAZOLE SODIUM 40 MG/1
40 TABLET, DELAYED RELEASE ORAL
Status: DISCONTINUED | OUTPATIENT
Start: 2024-03-22 | End: 2024-03-24 | Stop reason: HOSPADM

## 2024-03-21 RX ORDER — SODIUM CHLORIDE 9 MG/ML
INJECTION, SOLUTION INTRAVENOUS CONTINUOUS
Status: DISCONTINUED | OUTPATIENT
Start: 2024-03-21 | End: 2024-03-23

## 2024-03-21 RX ORDER — POLYETHYLENE GLYCOL 3350 17 G/17G
17 POWDER, FOR SOLUTION ORAL DAILY PRN
Status: DISCONTINUED | OUTPATIENT
Start: 2024-03-21 | End: 2024-03-24 | Stop reason: HOSPADM

## 2024-03-21 RX ORDER — SODIUM CHLORIDE 9 MG/ML
INJECTION, SOLUTION INTRAVENOUS PRN
Status: DISCONTINUED | OUTPATIENT
Start: 2024-03-21 | End: 2024-03-24 | Stop reason: HOSPADM

## 2024-03-21 RX ORDER — DORZOLAMIDE HYDROCHLORIDE AND TIMOLOL MALEATE 20; 5 MG/ML; MG/ML
1 SOLUTION/ DROPS OPHTHALMIC NIGHTLY
Status: DISCONTINUED | OUTPATIENT
Start: 2024-03-21 | End: 2024-03-24 | Stop reason: HOSPADM

## 2024-03-21 RX ORDER — ENOXAPARIN SODIUM 100 MG/ML
40 INJECTION SUBCUTANEOUS DAILY
Status: DISCONTINUED | OUTPATIENT
Start: 2024-03-21 | End: 2024-03-24 | Stop reason: HOSPADM

## 2024-03-21 RX ORDER — LATANOPROST 50 UG/ML
1 SOLUTION/ DROPS OPHTHALMIC NIGHTLY
Status: ON HOLD | COMMUNITY

## 2024-03-21 RX ORDER — SODIUM CHLORIDE 0.9 % (FLUSH) 0.9 %
5-40 SYRINGE (ML) INJECTION PRN
Status: DISCONTINUED | OUTPATIENT
Start: 2024-03-21 | End: 2024-03-24 | Stop reason: HOSPADM

## 2024-03-21 RX ORDER — AMLODIPINE BESYLATE 5 MG/1
5 TABLET ORAL DAILY
Status: DISCONTINUED | OUTPATIENT
Start: 2024-03-21 | End: 2024-03-23

## 2024-03-21 RX ORDER — ASPIRIN 81 MG/1
81 TABLET, CHEWABLE ORAL DAILY
Status: DISCONTINUED | OUTPATIENT
Start: 2024-03-21 | End: 2024-03-24 | Stop reason: HOSPADM

## 2024-03-21 RX ORDER — SODIUM CHLORIDE 0.9 % (FLUSH) 0.9 %
5-40 SYRINGE (ML) INJECTION EVERY 12 HOURS SCHEDULED
Status: DISCONTINUED | OUTPATIENT
Start: 2024-03-21 | End: 2024-03-24 | Stop reason: HOSPADM

## 2024-03-21 RX ORDER — DORZOLAMIDE HYDROCHLORIDE AND TIMOLOL MALEATE 20; 5 MG/ML; MG/ML
1 SOLUTION/ DROPS OPHTHALMIC NIGHTLY
Status: ON HOLD | COMMUNITY

## 2024-03-21 RX ORDER — ONDANSETRON 4 MG/1
4 TABLET, ORALLY DISINTEGRATING ORAL EVERY 8 HOURS PRN
Status: DISCONTINUED | OUTPATIENT
Start: 2024-03-21 | End: 2024-03-24 | Stop reason: HOSPADM

## 2024-03-21 RX ORDER — TAMSULOSIN HYDROCHLORIDE 0.4 MG/1
0.4 CAPSULE ORAL DAILY
Status: DISCONTINUED | OUTPATIENT
Start: 2024-03-21 | End: 2024-03-24 | Stop reason: HOSPADM

## 2024-03-21 RX ORDER — CLOPIDOGREL BISULFATE 75 MG/1
75 TABLET ORAL DAILY
Status: DISCONTINUED | OUTPATIENT
Start: 2024-03-21 | End: 2024-03-24 | Stop reason: HOSPADM

## 2024-03-21 RX ORDER — AMLODIPINE BESYLATE 5 MG/1
5 TABLET ORAL ONCE
Status: COMPLETED | OUTPATIENT
Start: 2024-03-21 | End: 2024-03-21

## 2024-03-21 RX ORDER — ATORVASTATIN CALCIUM 40 MG/1
80 TABLET, FILM COATED ORAL NIGHTLY
Status: DISCONTINUED | OUTPATIENT
Start: 2024-03-21 | End: 2024-03-24 | Stop reason: HOSPADM

## 2024-03-21 RX ORDER — LABETALOL HYDROCHLORIDE 5 MG/ML
10 INJECTION, SOLUTION INTRAVENOUS EVERY 10 MIN PRN
Status: DISCONTINUED | OUTPATIENT
Start: 2024-03-21 | End: 2024-03-24 | Stop reason: HOSPADM

## 2024-03-21 RX ADMIN — SODIUM CHLORIDE: 9 INJECTION, SOLUTION INTRAVENOUS at 18:55

## 2024-03-21 RX ADMIN — TAMSULOSIN HYDROCHLORIDE 0.4 MG: 0.4 CAPSULE ORAL at 17:36

## 2024-03-21 RX ADMIN — DORZOLAMIDE HYDROCHLORIDE AND TIMOLOL MALEATE 1 DROP: 20; 5 SOLUTION/ DROPS OPHTHALMIC at 22:21

## 2024-03-21 RX ADMIN — LABETALOL HYDROCHLORIDE 10 MG: 5 INJECTION INTRAVENOUS at 10:47

## 2024-03-21 RX ADMIN — DESMOPRESSIN ACETATE 80 MG: 0.2 TABLET ORAL at 22:24

## 2024-03-21 RX ADMIN — LATANOPROST 1 DROP: 50 SOLUTION OPHTHALMIC at 22:22

## 2024-03-21 RX ADMIN — SODIUM CHLORIDE: 9 INJECTION, SOLUTION INTRAVENOUS at 10:54

## 2024-03-21 RX ADMIN — IOMEPROL INJECTION 75 ML: 714 INJECTION, SOLUTION INTRAVASCULAR at 08:11

## 2024-03-21 RX ADMIN — ENOXAPARIN SODIUM 40 MG: 100 INJECTION SUBCUTANEOUS at 17:35

## 2024-03-21 RX ADMIN — AMLODIPINE BESYLATE 5 MG: 5 TABLET ORAL at 17:38

## 2024-03-21 RX ADMIN — CLOPIDOGREL BISULFATE 75 MG: 75 TABLET ORAL at 17:36

## 2024-03-21 ASSESSMENT — LIFESTYLE VARIABLES
HOW OFTEN DO YOU HAVE A DRINK CONTAINING ALCOHOL: NEVER
HOW MANY STANDARD DRINKS CONTAINING ALCOHOL DO YOU HAVE ON A TYPICAL DAY: PATIENT DOES NOT DRINK

## 2024-03-21 ASSESSMENT — PAIN - FUNCTIONAL ASSESSMENT
PAIN_FUNCTIONAL_ASSESSMENT: NONE - DENIES PAIN

## 2024-03-21 NOTE — PLAN OF CARE
Admit to 2w with telemetry    CVA, likely over a week old  ED discussed with stroke team, no intervention  MRI  Echo  Neurology consulted  Code status needs verified on admission      Valerie Pagan, APRN - CNP

## 2024-03-21 NOTE — ACP (ADVANCE CARE PLANNING)
Advance Care Planning     General Advance Care Planning (ACP) Conversation    Date of Conversation: 3/21/2024  Conducted with:  Healthcare Decision Maker: Next of Kin by law (only applies in absence of above) (name) Ronan Perez    Healthcare Decision Maker:    Primary Decision Maker: Ronan Voss - Chloe - [phone]    Secondary Decision Maker: Lianna Voss - [phone]  Click here to complete Healthcare Decision Makers including selection of the Healthcare Decision Maker Relationship (ie \"Primary\").   Today we documented Decision Maker(s) consistent with Legal Next of Kin hierarchy.    Content/Action Overview:  Has ACP document(s) NOT on file - requested patient to provide  Reviewed DNR/DNI and patient elects DNR order - referred to ACP Clinical Specialist & placed order        Length of Voluntary ACP Conversation in minutes:  <16 minutes (Non-Billable)    Olivia Lopez RN

## 2024-03-21 NOTE — ED PROVIDER NOTES
[facility] MEDICAL-SURGICAL  EMERGENCY DEPARTMENT ENCOUNTER      Pt Name: Zana Voss  MRN: [id number]  Birthdate 5/21/1931  Date of evaluation: 3/21/2024  Provider: Daniel Ley MD    CHIEF COMPLAINT       Chief Complaint   Patient presents with    Cerebrovascular Accident     Pt via squad from home for AMS.  Unknown history.  Aphagia and right sided deficits, unknown if new or old.  Per squad \"family thought he had a stroke last week, but refused to be seen\"  Pt disoirented         HISTORY OF PRESENT ILLNESS   (Location/Symptom, Timing/Onset, Context/Setting, Quality, Duration, Modifying Factors, Severity)  Note limiting factors.   Zana Voss is a 89 y.o. male who presents to the emergency department with the chief complaint of   Chief Complaint   Patient presents with    Cerebrovascular Accident     Pt via squad from home for AMS.  Unknown history.  Aphagia and right sided deficits, unknown if new or old.  Per squad \"family thought he had a stroke last week, but refused to be seen\"  Pt disoirented   . 89-year-old male with past medical history significant for hypertension, GERD, BPH, remote history of GI bleed in February 2022 presents to the ER via EMS from home with right-sided deficits.  Per EMS report, the patient's deficit started 1 week ago.  Family at home attempted to have the patient sent to the ER but the patient refused at that time.  Apparently, today patient was convinced to come to the ER.  Patient is nearly aphasic, unable to completely follow commands has a partial left-sided gaze, unable to raise right arm or right leg.  Patient appears intermittently confused unable to complete a thorough neuroexam at this time.  Stroke alert was called even though patient appears to be outside of meaningful intervention.  Spoke with stroke team who recommends obtaining a CTA to assess if there is possible intervention that could occur.  Attempting to obtain collateral information.    0800:  Discharge Medication List        Controlled Substances Monitoring:          No data to display                (Please note that portions of this note were completed with a voice recognition program.  Efforts were made to edit the dictations but occasionally words are mis-transcribed.)    Daniel Ley MD (electronically signed)  Attending Emergency Physician            Daniel Ley MD  03/21/24 0242

## 2024-03-21 NOTE — PLAN OF CARE
Problem: SLP Adult - Impaired Swallowing  Goal: By Discharge: Advance to least restrictive diet without signs or symptoms of aspiration for planned discharge setting.  See evaluation for individualized goals.  Note: SLP completed evaluation. Please refer to notes in EMR.   Maribell Evangelista M.A., CF-SLP #[id number]  Speech-Language Pathologist  Desk: [phone]

## 2024-03-21 NOTE — ED NOTES
Report called to Huber MOTLEY @ [facility]. Pt to ambulance via cot without incident or c/o's

## 2024-03-21 NOTE — CONSULTS
Neurology consultation note    Patient name: Zana Voss      Chief Complaint:  New onset of speech difficulty.    History of present illness:  This is a 89 years old right-handed male.  The patient was brought to the ER this morning due to the new onset of speech difficulty.  The patient is poor historian.  So history is obtained from medical record review.  The onset was about a week ago.  The course is slowly progressive.  The patient is noted for history of hypertension, TIA, and hyperlipidemia.  The patient also developed a subtle degree of right hemiparesis.  Upon admission, the patient had significant hypertension.  The patient was on aspirin at home.    Past medical history:    Past Medical History:   Diagnosis Date    COVID 12/2021    GERD with esophagitis     Hearing loss     [place] (hard of hearing)     Hyperlipidemia     Hypertension     Melanoma (HCC)     right elbow    TIA (transient ischemic attack)     Wears dentures     upper/lower       Past surgical history:    Past Surgical History:   Procedure Laterality Date    COLONOSCOPY N/A 2/27/2022    COLONOSCOPY POLYPECTOMY SNARE/COLD BIOPSY performed by Cody Trejo MD at [facility] ENDOSCOPY    COLONOSCOPY N/A 2/27/2022    COLONOSCOPY WITH BIOPSY performed by Cody Trejo MD at [facility] ENDOSCOPY    EYE SURGERY      cataracts merary  2010    FEMUR SURGERY Left     eleazar    HIP SURGERY Right     right hip plate    OTHER SURGICAL HISTORY Right 12/06/2017    RIGHT ELBOW MELANOMA EXCISION AND RIGHT AXILLARY SENTINEL    UPPER GASTROINTESTINAL ENDOSCOPY N/A 2/26/2022    EGD BIOPSY performed by Cody Trejo MD at [facility] ENDOSCOPY        Medication:    Current Facility-Administered Medications   Medication Dose Route Frequency Provider Last Rate Last Admin    0.9 % sodium chloride infusion   IntraVENous Continuous Daniel Ley MD   Stopped at 03/21/24 1305    [Held by provider] amLODIPine (NORVASC) tablet 5 mg  5 mg Oral Daily Ej

## 2024-03-21 NOTE — PLAN OF CARE
TODAY'S DATE:  3/21/2024      Current NIHSS 7      Discussed personal risk factors for Stroke/TIA with patient/family, and ways to reduce the risk for a recurrent stroke.     Patient's personal risk factors which were identified are:   [x]   Alcohol Abuse: check with your physician before any alcohol consumption.   [x]   Atrial fibrillation: may cause blood clots  [x]   Drug Abuse: Seek help, talk with your doctor  [x]   Clotting Disorder  [x]   Diabetes  [x]   Family history of stroke or heart disease  [x]   High Blood Pressure/Hypertension: work with your physician  [x]   High cholesterol: monitor cholesterol levels with your physician  [x]   Overweight/Obesity: work with your physician for your ideal body weight  [x]   Physical Inactivity: get regular exercise as directed by your physician  [x]   Personal history of previous TIA or stroke  [x]   Poor Diet: decrease salt (sodium) in your diet, follow diet directed by physician  [x]   Smoking: stop smoking      Reviewed the Following Education with Patient and/or Family:   - Signs and Symptoms of Stroke  - Personal risk factors   - How to activate EMS (911)   - Importance of Follow Up Appointments at Discharge   - Importance of Compliance with Medications Prescribed at Discharge  - Available community resources and stroke advocacy groups if needed    Patient and/or family member: with no evidence of learning.     Stroke Education booklet given to patient/family (or verified, if given already), which reviews above information. yes         Electronically signed by Huber Quinteros RN on 3/21/2024 at 2:19 PM

## 2024-03-21 NOTE — PROGRESS NOTES
Patient fairly aphasic. Patient has diminished ability to understand and give information. SLP provided a communication board, seems to help patient some. Patient is very [place]. NIH 7 still.

## 2024-03-21 NOTE — PROGRESS NOTES
POLYPECTOMY SNARE/COLD BIOPSY performed by Cody Trejo MD at [facility] ENDOSCOPY    COLONOSCOPY N/A 2/27/2022    COLONOSCOPY WITH BIOPSY performed by Cody Trejo MD at [facility] ENDOSCOPY    EYE SURGERY      cataracts merary  2010    FEMUR SURGERY Left     eleazar    HIP SURGERY Right     right hip plate    OTHER SURGICAL HISTORY Right 12/06/2017    RIGHT ELBOW MELANOMA EXCISION AND RIGHT AXILLARY SENTINEL    UPPER GASTROINTESTINAL ENDOSCOPY N/A 2/26/2022    EGD BIOPSY performed by Cody Trejo MD at [facility] ENDOSCOPY     History reviewed. No pertinent family history.  No Known Allergies    DATE ONSET: 3/21/2024    Date of Evaluation: 3/21/2024   Evaluating Therapist: Maribell Evangelista SLP    Chart Reviewed: : [x] Yes [] No     Pain: The patient does not complain of pain.           Data Review:        Current Diet: Diet NPO    Lab Values:    CBC:  Lab Results   Component Value Date    WBC 11.6 (H) 03/21/2024    HGB 15.3 03/21/2024    HCT 46.8 03/21/2024    MCV 86.0 03/21/2024     03/21/2024         Basic Metabolic Panel  Lab Results   Component Value Date/Time     03/21/2024 08:02 AM     03/21/2024 08:02 AM    CO2 24 03/21/2024 08:02 AM    GLUCOSE 108 03/21/2024 08:02 AM    BUN 23 03/21/2024 08:02 AM    CREATININE 1.3 03/21/2024 08:02 AM       HEPATIC PANEL   Lab Results   Component Value Date/Time    AST 23 03/21/2024 08:02 AM    ALT 32 03/21/2024 08:02 AM       No results found for: \"CKTOTAL\", \"CKMB\", \"CKMBINDEX\", \"TROPONINI\"    Lab Results   Component Value Date/Time    INR 1.02 03/21/2024 08:02 AM       Magnesium:  No results found for: \"MG\"    U/A:  No results found for: \"NITRITE\", \"COLORU\", \"WBCUA\", \"RBCUA\", \"MUCUS\", \"BACTERIA\", \"CLARITYU\", \"SPECGRAV\", \"LEUKOCYTESUR\", \"BLOODU\", \"GLUCOSEU\", \"AMORPHOUS\"  ABG:  No results found for: \"FBH2RRL\", \"BEART\", \"O0DMVCIE\", \"PHART\", \"THGBART\", \"NAA3ETP\", \"PO2ART\", \"SLZ6MGD\"      Results       Procedure Component Value Units Date/Time     WFL    Laryngeal function exam:   Secretions: Mucosa pink and wet.   Vocal quality: See CN exam above  MPT: See CN exam above  S/Z ratio: NAV  Pitch range: See CN exam above  Cough: See CN exam above    Oral Care Status:    [] Oral Care WFL  [] Poor oral care status  [] Edentulous  [x] Upper Dentures  [x] Lower Dentures  [] Missing/Broken Teeth  [] Evidence of dental cavities/carries    Oral Care Completed?   [] Yes   [x] No  Attempted- pt declined    PO trials:   Baseline cough noted prior to PO trials? [x] Yes   [] No  Baseline SPO2%: 98  Baseline RR: 16  Supplemental O2 Status: RA     Ice: no anterior bolus loss , suspect functional A-P bolus transit, swallow timing subjectively appears timely, and no clinical s/s of aspiration    IDDSI 0 (thin):   - Cup: no anterior bolus loss , suspect functional A-P bolus transit, and immediate throat clearing  - Straw: no anterior bolus loss , suspect functional A-P bolus transit, and coughing after the swallow    IDDSI 4 (puree): no anterior bolus loss , suspect functional A-P bolus transit, swallow timing subjectively appears timely, and no clinical s/s of aspiration    IDDSI 7 (regular): no anterior bolus loss , suspect functional A-P bolus transit, swallow timing subjectively appears timely, and no clinical s/s of aspiration    3 oz water: NAV    SPO2% following trials: 98  RR following trials: 18    Impressions:    Patient presents as moderate aspiration risk and moderate PNA / adverse pulmonary event risk given the following factors via BOLUS Framework (YOSEPH Ramos. & Littlejohn, A.H. (2021):  hx GERD / GI disease affecting oropharyngeal swallowing  poor oral health  reduced ambulation   Current smoker / chronic smoking hx    Clinical signs of oropharyngeal dysphagia likely acute related to CVA. Swallow prognosis is good. Instrumental swallow study is not indicated, however, should monitor given moderate risks mentioned above .  Given tolerance to PO at bedside, stable

## 2024-03-21 NOTE — PROGRESS NOTES
Patient admitted to room 213 from [facility] ED. Side rails up x2. Patient has an order for telemetry. Bed is locked and in lowest position. Call light placed in patient reach. Patient explained the routine of the hospital, including but not limited to lab work, vital signs, hourly rounding, etc. Care plans and education updated, CHG wipes perfomred at time of admission.     BP (!) 187/71   Pulse 86   Temp 98.6 °F (37 °C) (Oral)   Resp 18   Ht 1.702 m (5' 7\")   Wt 95.3 kg (210 lb)   SpO2 98%   BMI 32.89 kg/m²     Most recent set of vitals as shown.     Patient has an LDA that does not  require CHG wipes, including possible a surgery incision, blunt catheter, or a central line.     4 Eyes Skin Assessment     The patient is being assess for   Admission    I agree that 2 RN's have performed a thorough Head to Toe Skin Assessment on the patient. ALL assessment sites listed below have been assessed.      Areas assessed for pressure by both nurses:   [x]   Head, Face, and Ears   [x]   Shoulders, Back, and Chest, Abdomen  [x]   Arms, Elbows, and Hands   [x]   Coccyx, Sacrum, and Ischium  [x]   Legs, Feet, and Heels    Skin tear to right lateral upper arm, Right proximal forerm, right distal forearm    Scattered bruises to all extremeties                     **SHARE this note so that the co-signing nurse is able to place an eSignature**    Co-signer eSignature: {Esignature:[id number]}    Does the Patient have Skin Breakdown related to pressure?  No            [place] Prevention initiated:  No   Wound Care Orders initiated:  Yes      [facility] nurse consulted for Pressure Injury (Stage 3,4, Unstageable, DTI, NWPT, Complex wounds)and New or Established Ostomies:  No      Primary Nurse eSignature: Electronically signed by Huber Quinteros RN on 3/21/24 at 5:25 PM EDT      Bedside Mobility Assessment Tool (BMAT):     Assessment Level 1- Sit and Shake    1. From a semi-reclined position, ask patient to sit up and rotate to a seated

## 2024-03-22 ENCOUNTER — APPOINTMENT (OUTPATIENT)
Dept: GENERAL RADIOLOGY | Age: 89
DRG: 065 | End: 2024-03-22
Payer: MEDICARE

## 2024-03-22 ENCOUNTER — APPOINTMENT (OUTPATIENT)
Dept: MRI IMAGING | Age: 89
DRG: 065 | End: 2024-03-22
Payer: MEDICARE

## 2024-03-22 LAB
ANION GAP SERPL CALCULATED.3IONS-SCNC: 11 MMOL/L (ref 3–16)
BASOPHILS # BLD: 0 K/UL (ref 0–0.2)
BASOPHILS NFR BLD: 0.6 %
BUN SERPL-MCNC: 18 MG/DL (ref 7–20)
CALCIUM SERPL-MCNC: 7.9 MG/DL (ref 8.3–10.6)
CHLORIDE SERPL-SCNC: 109 MMOL/L (ref 99–110)
CHOLEST SERPL-MCNC: 116 MG/DL (ref 0–199)
CO2 SERPL-SCNC: 21 MMOL/L (ref 21–32)
CREAT SERPL-MCNC: 0.9 MG/DL (ref 0.8–1.3)
DEPRECATED RDW RBC AUTO: 15 % (ref 12.4–15.4)
EOSINOPHIL # BLD: 0.1 K/UL (ref 0–0.6)
EOSINOPHIL NFR BLD: 0.9 %
GFR SERPLBLD CREATININE-BSD FMLA CKD-EPI: >60 ML/MIN/{1.73_M2}
GLUCOSE SERPL-MCNC: 82 MG/DL (ref 70–99)
HCT VFR BLD AUTO: 40.1 % (ref 40.5–52.5)
HDLC SERPL-MCNC: 45 MG/DL (ref 40–60)
HGB BLD-MCNC: 13.5 G/DL (ref 13.5–17.5)
LDLC SERPL CALC-MCNC: 57 MG/DL
LYMPHOCYTES # BLD: 1.4 K/UL (ref 1–5.1)
LYMPHOCYTES NFR BLD: 17.9 %
MCH RBC QN AUTO: 29 PG (ref 26–34)
MCHC RBC AUTO-ENTMCNC: 33.7 G/DL (ref 31–36)
MCV RBC AUTO: 86.1 FL (ref 80–100)
MONOCYTES # BLD: 0.6 K/UL (ref 0–1.3)
MONOCYTES NFR BLD: 7.7 %
NEUTROPHILS # BLD: 5.6 K/UL (ref 1.7–7.7)
NEUTROPHILS NFR BLD: 72.9 %
PLATELET # BLD AUTO: 217 K/UL (ref 135–450)
PMV BLD AUTO: 7.7 FL (ref 5–10.5)
POTASSIUM SERPL-SCNC: 3.9 MMOL/L (ref 3.5–5.1)
RBC # BLD AUTO: 4.66 M/UL (ref 4.2–5.9)
SODIUM SERPL-SCNC: 141 MMOL/L (ref 136–145)
TRIGL SERPL-MCNC: 70 MG/DL (ref 0–150)
VLDLC SERPL CALC-MCNC: 14 MG/DL
WBC # BLD AUTO: 7.7 K/UL (ref 4–11)

## 2024-03-22 PROCEDURE — 74019 RADEX ABDOMEN 2 VIEWS: CPT

## 2024-03-22 PROCEDURE — 80048 BASIC METABOLIC PNL TOTAL CA: CPT

## 2024-03-22 PROCEDURE — 99233 SBSQ HOSP IP/OBS HIGH 50: CPT | Performed by: PSYCHIATRY & NEUROLOGY

## 2024-03-22 PROCEDURE — 97166 OT EVAL MOD COMPLEX 45 MIN: CPT

## 2024-03-22 PROCEDURE — 97535 SELF CARE MNGMENT TRAINING: CPT

## 2024-03-22 PROCEDURE — 2580000003 HC RX 258: Performed by: EMERGENCY MEDICINE

## 2024-03-22 PROCEDURE — 85025 COMPLETE CBC W/AUTO DIFF WBC: CPT

## 2024-03-22 PROCEDURE — 1200000000 HC SEMI PRIVATE

## 2024-03-22 PROCEDURE — 6370000000 HC RX 637 (ALT 250 FOR IP): Performed by: PSYCHIATRY & NEUROLOGY

## 2024-03-22 PROCEDURE — 6360000002 HC RX W HCPCS: Performed by: NURSE PRACTITIONER

## 2024-03-22 PROCEDURE — 70551 MRI BRAIN STEM W/O DYE: CPT

## 2024-03-22 PROCEDURE — 6370000000 HC RX 637 (ALT 250 FOR IP): Performed by: NURSE PRACTITIONER

## 2024-03-22 PROCEDURE — 97161 PT EVAL LOW COMPLEX 20 MIN: CPT

## 2024-03-22 PROCEDURE — 71046 X-RAY EXAM CHEST 2 VIEWS: CPT

## 2024-03-22 PROCEDURE — 80061 LIPID PANEL: CPT

## 2024-03-22 PROCEDURE — 97530 THERAPEUTIC ACTIVITIES: CPT

## 2024-03-22 PROCEDURE — 36415 COLL VENOUS BLD VENIPUNCTURE: CPT

## 2024-03-22 PROCEDURE — 83036 HEMOGLOBIN GLYCOSYLATED A1C: CPT

## 2024-03-22 RX ADMIN — CLOPIDOGREL BISULFATE 75 MG: 75 TABLET ORAL at 08:25

## 2024-03-22 RX ADMIN — ENOXAPARIN SODIUM 40 MG: 100 INJECTION SUBCUTANEOUS at 08:25

## 2024-03-22 RX ADMIN — DORZOLAMIDE HYDROCHLORIDE AND TIMOLOL MALEATE 1 DROP: 20; 5 SOLUTION/ DROPS OPHTHALMIC at 21:45

## 2024-03-22 RX ADMIN — DESMOPRESSIN ACETATE 80 MG: 0.2 TABLET ORAL at 21:44

## 2024-03-22 RX ADMIN — PANTOPRAZOLE SODIUM 40 MG: 40 TABLET, DELAYED RELEASE ORAL at 05:45

## 2024-03-22 RX ADMIN — ASPIRIN 81 MG: 81 TABLET, CHEWABLE ORAL at 08:25

## 2024-03-22 RX ADMIN — AMLODIPINE BESYLATE 5 MG: 5 TABLET ORAL at 08:25

## 2024-03-22 RX ADMIN — TAMSULOSIN HYDROCHLORIDE 0.4 MG: 0.4 CAPSULE ORAL at 08:25

## 2024-03-22 RX ADMIN — SODIUM CHLORIDE: 9 INJECTION, SOLUTION INTRAVENOUS at 16:12

## 2024-03-22 RX ADMIN — LATANOPROST 1 DROP: 50 SOLUTION OPHTHALMIC at 21:46

## 2024-03-22 NOTE — PROGRESS NOTES
Shift assessment complete; see flow sheet.  Scheduled medications administered; See MAR.  IV infusing without difficulty. Pt denies pain at this time. NIHSS score 6 at this time. Pt denies any needs at this time. Pt educated on use of call light and to call out with needs, verbalized understanding, bed in low locked position for pt safety

## 2024-03-22 NOTE — PROGRESS NOTES
Handoff report and transfer of care given at bedside to Mena MOTLEY.  Patient in stable condition, denies needs/concerns at this time.  Call light within reach.

## 2024-03-22 NOTE — PROGRESS NOTES
Neurology Progress Note    ID: Zana Voss is a 89 y.o. male    : 1931     LOS: 1 day     ASSESSMENT    1.  Subacute left MCA infarction.  2.  Left MCA occlusion.  3.  Intracranial atherosclerosis at bilateral PCA.     The patient has significant aphasia during my assessment.  CTA of the head neck showed intracranial atherosclerosis and bilateral PCA.  It also showed complete occlusion of left M1.  CT brain showed hypodense lesion at left MCA territory.  EKG showed normal sinus rhythm.  Echocardiogram showed normal EF.  MRI brain is still pending.     From neurology perspective, the etiology of acute stroke is likely cardioembolism.  However, the patient also has more than 1 stroke risk factors including intracranial atherosclerosis and small vessel disease.    24: The patient remains to have significant aphasia and minimal degree of right hemiparesis.     Plan:     1.  Adding clopidogrel with aspirin.  2.  Target blood pressure below 140/90.  3.  PT/OT/ST.  4.  Continue cardiac telemetry.  5.  Continue statin.    If the patient develops paroxysmal atrial fibrillation, the patient may continue clopidogrel alone with anticoagulation, Eliquis.    Medications:  Scheduled Meds:    amLODIPine  5 mg Oral Daily    pantoprazole  40 mg Oral QAM AC    tamsulosin  0.4 mg Oral Daily    sodium chloride flush  5-40 mL IntraVENous 2 times per day    enoxaparin  40 mg SubCUTAneous Daily    aspirin  81 mg Oral Daily    Or    aspirin  300 mg Rectal Daily    atorvastatin  80 mg Oral Nightly    clopidogrel  75 mg Oral Daily    dorzolamide-timolol  1 drop Both Eyes Nightly    latanoprost  1 drop Both Eyes Nightly     Continuous Infusions:    sodium chloride 100 mL/hr at 24 1855    sodium chloride       PRN Meds: sodium chloride flush, sodium chloride, ondansetron **OR** ondansetron, polyethylene glycol, perflutren lipid microspheres, labetalol    OBJECTIVE  Vital signs in the last 24 hours:  Vitals:    24  1004   BP: (!) 157/84   Pulse:    Resp:    Temp:    SpO2:        Intake/Output last 3 shifts:  I/O last 3 completed shifts:  In: -   Out: 550 [Urine:550]    Intake/Output this shift:  I/O this shift:  In: 0   Out: 350 [Urine:350]    Yesterday's Weight:  Wt Readings from Last 1 Encounters:   03/21/24 95.3 kg (210 lb)       SUBJECTIVE  There was no acute event overnight.    ROS:  Negative except in subjective.    Neurological examination:  MENTAL STATUS:  Alert and oriented to person.  LANG/SPEECH: Global aphasia.  CRANIAL NERVES: No facial asymmetry.  MOTOR: Right plantar drift.  REFLEXES: Generalized 2+.  SENSORY: Grossly intact.  COORD: No tremor.    Labs and Imaging:  I reviewed labs and brain imaging.    50 minutes were spent with the patient with greater than 50% of the time spent in counseling and coordination of care.    Martin Obrien MD

## 2024-03-22 NOTE — PLAN OF CARE
Problem: Neurosensory - Adult  Goal: Achieves stable or improved neurological status  Outcome: Progressing  Goal: Achieves maximal functionality and self care  Outcome: Progressing     Problem: Skin/Tissue Integrity  Goal: Absence of new skin breakdown  Description: 1.  Monitor for areas of redness and/or skin breakdown  2.  Assess vascular access sites hourly  3.  Every 4-6 hours minimum:  Change oxygen saturation probe site  4.  Every 4-6 hours:  If on nasal continuous positive airway pressure, respiratory therapy assess nares and determine need for appliance change or resting period.  Outcome: Progressing

## 2024-03-22 NOTE — PROGRESS NOTES
sodium chloride flush  5-40 mL IntraVENous 2 times per day    enoxaparin  40 mg SubCUTAneous Daily    aspirin  81 mg Oral Daily    Or    aspirin  300 mg Rectal Daily    atorvastatin  80 mg Oral Nightly    clopidogrel  75 mg Oral Daily    dorzolamide-timolol  1 drop Both Eyes Nightly    latanoprost  1 drop Both Eyes Nightly       Continuous Infusions:   sodium chloride 100 mL/hr at 03/21/24 1855    sodium chloride         Data:  CBC:   Recent Labs     03/21/24  0802 03/22/24  0552   WBC 11.6* 7.7   RBC 5.44 4.66   HGB 15.3 13.5   HCT 46.8 40.1*   MCV 86.0 86.1   RDW 15.1 15.0    217     BMP:   Recent Labs     03/21/24  0802 03/22/24  0552    141   K 4.0 3.9    109   CO2 24 21   BUN 23* 18   CREATININE 1.3 0.9     BNP: No results for input(s): \"BNP\" in the last 72 hours.  PT/INR:   Recent Labs     03/21/24  0802   PROTIME 13.4   INR 1.02     APTT: No results for input(s): \"APTT\" in the last 72 hours.  CARDIAC ENZYMES: No results for input(s): \"CKMB\", \"CKMBINDEX\", \"TROPONINI\" in the last 72 hours.    Invalid input(s): \"CKTOTAL;3\"  FASTING LIPID PANEL:No results found for: \"CHOL\", \"HDL\", \"TRIG\"  LIVER PROFILE:   Recent Labs     03/21/24  0802   AST 23   ALT 32   BILITOT 0.7   ALKPHOS 83        Cultures      Results       Procedure Component Value Units Date/Time    COVID-19, Rapid [[id number]] Collected: 03/21/24 1051    Order Status: Completed Specimen: Nasopharyngeal Swab Updated: 03/21/24 1128     SARS-CoV-2, NAAT Not Detected     Comment: Rapid NAAT:   Negative results should be treated as presumptive and,  if inconsistent with clinical signs and symptoms or necessary for  patient management, should be tested with an alternative molecular  assay. Negative results do not preclude SARS-CoV-2 infection and  should not be used as the sole basis for patient management decisions.  This test has been authorized by the FDA under an Emergency Use  Authorization (EUA) for use by authorized  represent new or recent left-sided infarcts      Underlying atrophy and small-vessel ischemic change.  No obvious hemorrhage.      RECOMMENDATIONS:   Results discussed with CATRACHITA ECHEVERRIA by Chema Cochran MD at 8:19 am   on 3/21/2024           ECHO   Summary    Left ventricular systolic function is normal with ejection fraction    estimated at 55%.    No regional wall motion abnormalities.    There is mild concentric left ventricular hypertrophy.    Indeterminate left ventricular filling pressure.    The left atrium is mildly dilated.    Mild mitral regurgitation.         Assessment:  Principal Problem:    Cerebrovascular accident (CVA) (HCC)  Active Problems:    Primary hypertension    Hyperlipidemia    Hx of transient ischemic attack (TIA)    Elevated serum creatinine  Resolved Problems:    * No resolved hospital problems. *      Plan:    #Acute left CVA  #Aphasia, right sided deficits   #Hx of TIA  -CTA with left MCA short segment occlusion, P2 stenoses of posterior cerebral arteries, multifocal stenosis of left vertebral artery   -symptoms originally started 1 week ago   -stroke team contacted in ED, no acute intervention   -MRI - subacute left temporal CVA    -echo  normal EF  - seen by neurology  -cont ASA, plavix, statin  -PT/OT/speech eval - needs acute rehab. CM following      #Elevated Cr   -Cr baseline ~0.9,1.3 on presentation  -IVF   -monitor renal function      #Elevated troponin   -25-->17   -EKG without acute ischemic changes      #Leukocytosis   -reactive   - monitor CBC  -urine studies pending      #HTN   -permissive HTN   -hold norvasc until ok with neurology   -labetalol prn      Note acute CVA, aphasia, elevated Cr makes patient higher risk for morbidity and mortality requiring testing and treatment.         DVT Prophylaxis: Lovenox   Diet:  ADULT DIET; Regular  Code Status: Full Code     DC planning to NKECHI Jenkins MD   3/22/2024 3:36 PM

## 2024-03-22 NOTE — PROGRESS NOTES
Bedside report given and pt care transferred to Chris MOTLEY. Pt denies needs at this time. Call light within reach.

## 2024-03-22 NOTE — PROGRESS NOTES
Speech Language Pathology  Attempt Note     Name: Zana Voss  : 1931  Medical Diagnosis: Acute CVA (cerebrovascular accident) (HCC) [I63.9]  Cerebrovascular accident (CVA), unspecified mechanism (HCC) [I63.9]      SLP attempted to see pt for dysphagia tx, reviewed pt's chart, spoke with RN.  RN reported pt requesting to rest this AM, hold f/u at this time.  SLP requested new order for SLP Eval and Treat to complete formal speech-language evaluation with pt as well.  ST to continue to follow and re-attempt at a later time as pt's condition and schedule allows. RN aware. No charges.    Soraida Leslie M.S. CCC-SLP  Speech-language pathologist  [phone]

## 2024-03-22 NOTE — FLOWSHEET NOTE
03/22/24 0812   Vital Signs   Temp 97.7 °F (36.5 °C)   Temp Source Oral   Pulse 67   Heart Rate Source Monitor   Respirations 16   BP (!) 190/71   MAP (Calculated) 111   BP Location Right upper arm   BP Method Automatic   Patient Position Semi fowlers   Oxygen Therapy   SpO2 93 %   O2 Device None (Room air)       Interval Reassessment   Level of Consciousness (1a) 0   LOC Questions (1b) 1   LOC Commands (1c) 0   Best Gaze (2) 0   Visual (3) 0   Facial Palsy (4) 0   Motor Arm, Left (5a) 0   Motor Arm, Right (5b) 1   Motor Leg, Left (6a) 0   Motor Leg, Right (6b) 1   Limb Ataxia (7) (!) 1   Sensory (8) 0   Best Language (9) 1   Dysarthria (10) 1   Extinction and Inattention (11) (!) 1   Total 7   NIHSS Stroke Scale Assessed Yes   AM assessment complete. Gave am meds due see mar. A/O. Answers yes/no questions, Utilizes communication board. Requests to sleep. Room lights off. Bed check. Bed locked/lowest position. Call light/tray table within reach.

## 2024-03-22 NOTE — PROGRESS NOTES
TODAY'S DATE:  3/22/2024      Current NIHSS 7      Discussed personal risk factors for Stroke/TIA with patient/family, and ways to reduce the risk for a recurrent stroke.     Patient's personal risk factors which were identified are:   []   Alcohol Abuse: check with your physician before any alcohol consumption.   [x]   Atrial fibrillation: may cause blood clots  []   Drug Abuse: Seek help, talk with your doctor  []   Clotting Disorder  []   Diabetes  [x]   Family history of stroke or heart disease  [x]   High Blood Pressure/Hypertension: work with your physician  []   High cholesterol: monitor cholesterol levels with your physician  []   Overweight/Obesity: work with your physician for your ideal body weight  [x]   Physical Inactivity: get regular exercise as directed by your physician  [x]   Personal history of previous TIA or stroke  [x]   Poor Diet: decrease salt (sodium) in your diet, follow diet directed by physician  []   Smoking: stop smoking      Reviewed the Following Education with Patient and/or Family:   - Signs and Symptoms of Stroke  - Personal risk factors   - How to activate EMS (911)   - Importance of Follow Up Appointments at Discharge   - Importance of Compliance with Medications Prescribed at Discharge  - Available community resources and stroke advocacy groups if needed    Patient and/or family member: verbalized understanding.     Stroke Education booklet given to patient/family (or verified, if given already), which reviews above information. yes         Electronically signed by Ibeth Sharma RN on 3/22/2024 at 2:24 AM

## 2024-03-22 NOTE — PROGRESS NOTES
Inpatient Physical Therapy Evaluation & Treatment    Unit: [facility]  Date:  3/22/2024  Patient Name:    Zana Voss  Admitting diagnosis:  Acute CVA (cerebrovascular accident) (HCC) [I63.9]  Cerebrovascular accident (CVA), unspecified mechanism (HCC) [I63.9]  Admit Date:  3/21/2024  Precautions/Restrictions/WB Status/ Lines/ Wounds/ Oxygen: Fall risk, Bed/chair alarm, Confusion, [place] (hard of hearing), and Telemetry      Pt seen for cotreatment this date due to patient safety, patient endurance, acute illness/injury, and limited functional status information    Treatment Time:  13:30-14:19  Treatment Number:  1   Timed Code Treatment Minutes: 38 minutes  Total Treatment Minutes:  48  minutes    Patient Stated Goals for Therapy: \" none stated \"          Discharge Recommendations: ARU/IRF (inpatient rehab facility)   DME needs for discharge: Defer to facility       Therapy recommendation for EMS Transport: can transport by wheelchair    Therapy recommendations for staff:   Assist of 2 for transfers with use of rolling walker (RW) and gait belt to/from chair  to/from bathroom  within room    History of Present Illness:   Per AZUL Qiu () from 3/21/24:  \"The patient is a 89 y.o. male with pmhx of TIA, HTN, HLD who presented to [place] ED with concern for AMS. The patient is severely aphasic at this time and also hard of hearing. He is awake and alert; however, he is only able to respond by saying \"what\", \"I don't know,\" \"yes\", \"no\", or \"Oh\" to any questions. He is unable to provide any meaningful history at this time.  The patient's daughters and son are present at bedside. Per his daughter, the patient lives with his grandson. Family noticed starting about 1 week ago, he had difficulty with speech and difficulty walking. He was able to walk on his own, but this progressively worsened until he was not longer able to walk on his own today. Starting about 1 week ago, he had some speech changes, described by

## 2024-03-22 NOTE — PROGRESS NOTES
Inpatient Occupational Therapy Evaluation and Treatment    Unit: [facility]  Date:  3/22/2024  Patient Name:    Zana Voss  Admitting diagnosis:  Acute CVA (cerebrovascular accident) (HCC) [I63.9]  Cerebrovascular accident (CVA), unspecified mechanism (HCC) [I63.9]  Admit Date:  3/21/2024  Precautions/Restrictions/WB Status/ Lines/ Wounds/ Oxygen: Fall risk, Bed/chair alarm, Lines (IV), Confusion, [place] (hard of hearing), and Telemetry    Pt seen for cotreatment this date due to patient safety, patient endurance, acute illness/injury, and limited functional status information    Treatment Time:  3764-8636  Treatment Number:  1  Timed Code Treatment Minutes: 38 minutes  Total Treatment Minutes:  48  minutes    Patient Goals for Therapy: none stated          Discharge Recommendations: Acute Rehab (ARU)/ Inpatient Rehab Facility (IRF)  DME needs for discharge: Defer to facility       Therapy recommendations for staff:   Assist of 2 for transfers with use of rolling walker (RW) and gait belt to/from BSC  to/from chair    History of Present Illness: Per A Pasquale H&P:  \"89 y.o. male with pmhx of TIA, HTN, HLD who presented to [place] ED with concern for AMS. The patient is severely aphasic at this time and also hard of hearing. He is awake and alert; however, he is only able to respond by saying \"what\", \"I don't know,\" \"yes\", \"no\", or \"Oh\" to any questions. He is unable to provide any meaningful history at this time.  The patient's daughters and son are present at bedside. Per his daughter, the patient lives with his grandson. Family noticed starting about 1 week ago, he had difficulty with speech and difficulty walking. He was able to walk on his own, but this progressively worsened until he was not longer able to walk on his own today. Starting about 1 week ago, he had some speech changes, described by family as his \"thoughts and speech not connecting,\" which also progressively worsened to the point where he was not  cues    Rehabilitation Potential: Good  Strengths for achieving goals include: PLOF, Family Support, and Pt cooperative   Barriers to achieving goals include:  Complexity of condition    Plan:  To be seen 5-7x/wk while in acute care setting for therapeutic exercises, bed mobility, transfers, family/patient education, ADL/IADL retraining, and energy conservation training.    Electronically signed by Katerine Stapleton OT on 3/22/2024 at 3:41 PM      If patient discharges from this facility prior to next visit, this note will serve as the Discharge Summary

## 2024-03-23 LAB
EST. AVERAGE GLUCOSE BLD GHB EST-MCNC: 108.3 MG/DL
HBA1C MFR BLD: 5.4 %

## 2024-03-23 PROCEDURE — 6360000002 HC RX W HCPCS: Performed by: NURSE PRACTITIONER

## 2024-03-23 PROCEDURE — 6370000000 HC RX 637 (ALT 250 FOR IP): Performed by: NURSE PRACTITIONER

## 2024-03-23 PROCEDURE — 6360000002 HC RX W HCPCS: Performed by: INTERNAL MEDICINE

## 2024-03-23 PROCEDURE — 97535 SELF CARE MNGMENT TRAINING: CPT

## 2024-03-23 PROCEDURE — 6370000000 HC RX 637 (ALT 250 FOR IP): Performed by: INTERNAL MEDICINE

## 2024-03-23 PROCEDURE — 97530 THERAPEUTIC ACTIVITIES: CPT

## 2024-03-23 PROCEDURE — 6370000000 HC RX 637 (ALT 250 FOR IP): Performed by: PSYCHIATRY & NEUROLOGY

## 2024-03-23 PROCEDURE — 1200000000 HC SEMI PRIVATE

## 2024-03-23 RX ORDER — HYDRALAZINE HYDROCHLORIDE 20 MG/ML
10 INJECTION INTRAMUSCULAR; INTRAVENOUS EVERY 6 HOURS PRN
Status: DISCONTINUED | OUTPATIENT
Start: 2024-03-23 | End: 2024-03-24 | Stop reason: HOSPADM

## 2024-03-23 RX ORDER — AMLODIPINE BESYLATE 5 MG/1
5 TABLET ORAL ONCE
Status: COMPLETED | OUTPATIENT
Start: 2024-03-23 | End: 2024-03-23

## 2024-03-23 RX ORDER — AMLODIPINE BESYLATE 5 MG/1
10 TABLET ORAL DAILY
Status: DISCONTINUED | OUTPATIENT
Start: 2024-03-24 | End: 2024-03-24 | Stop reason: HOSPADM

## 2024-03-23 RX ADMIN — HYDRALAZINE HYDROCHLORIDE 10 MG: 20 INJECTION INTRAMUSCULAR; INTRAVENOUS at 18:23

## 2024-03-23 RX ADMIN — LATANOPROST 1 DROP: 50 SOLUTION OPHTHALMIC at 20:47

## 2024-03-23 RX ADMIN — PANTOPRAZOLE SODIUM 40 MG: 40 TABLET, DELAYED RELEASE ORAL at 06:23

## 2024-03-23 RX ADMIN — AMLODIPINE BESYLATE 5 MG: 5 TABLET ORAL at 10:08

## 2024-03-23 RX ADMIN — ASPIRIN 81 MG: 81 TABLET, CHEWABLE ORAL at 10:08

## 2024-03-23 RX ADMIN — AMLODIPINE BESYLATE 5 MG: 5 TABLET ORAL at 17:24

## 2024-03-23 RX ADMIN — TAMSULOSIN HYDROCHLORIDE 0.4 MG: 0.4 CAPSULE ORAL at 10:08

## 2024-03-23 RX ADMIN — DORZOLAMIDE HYDROCHLORIDE AND TIMOLOL MALEATE 1 DROP: 20; 5 SOLUTION/ DROPS OPHTHALMIC at 20:47

## 2024-03-23 RX ADMIN — CLOPIDOGREL BISULFATE 75 MG: 75 TABLET ORAL at 10:08

## 2024-03-23 RX ADMIN — ENOXAPARIN SODIUM 40 MG: 100 INJECTION SUBCUTANEOUS at 10:08

## 2024-03-23 RX ADMIN — DESMOPRESSIN ACETATE 80 MG: 0.2 TABLET ORAL at 20:47

## 2024-03-23 NOTE — PLAN OF CARE
Problem: Discharge Planning  Goal: Discharge to home or other facility with appropriate resources  3/23/2024 0728 by Ibeth Sharma RN  Outcome: Progressing  3/22/2024 2220 by Chris Foss RN  Outcome: Progressing     Problem: Neurosensory - Adult  Goal: Achieves stable or improved neurological status  3/23/2024 0728 by Ibeth Sharma RN  Outcome: Progressing  3/22/2024 2220 by Chris Foss RN  Outcome: Progressing  Goal: Achieves maximal functionality and self care  3/23/2024 0728 by Ibeth Sharma RN  Outcome: Progressing  3/22/2024 2220 by Chris Foss RN  Outcome: Progressing     Problem: Confusion  Goal: Confusion, delirium, dementia, or psychosis is improved or at baseline  Description: INTERVENTIONS:  1. Assess for possible contributors to thought disturbance, including medications, impaired vision or hearing, underlying metabolic abnormalities, dehydration, psychiatric diagnoses, and notify attending LIP  2. [place] high risk fall precautions, as indicated  3. Provide frequent short contacts to provide reality reorientation, refocusing and direction  4. Decrease environmental stimuli, including noise as appropriate  5. Monitor and intervene to maintain adequate nutrition, hydration, elimination, sleep and activity  6. If unable to ensure safety without constant attention obtain sitter and review sitter guidelines with assigned personnel  7. Initiate Psychosocial CNS and Spiritual Care consult, as indicated  3/23/2024 0728 by Ibeth Sharma RN  Outcome: Progressing  3/22/2024 2220 by Chris Foss RN  Outcome: Progressing     Problem: Skin/Tissue Integrity  Goal: Absence of new skin breakdown  Description: 1.  Monitor for areas of redness and/or skin breakdown  2.  Assess vascular access sites hourly  3.  Every 4-6 hours minimum:  Change oxygen saturation probe site  4.  Every 4-6 hours:  If on nasal continuous positive airway pressure, respiratory therapy assess nares and determine need for  appliance change or resting period.  3/23/2024 0728 by Ibeth Sharma RN  Outcome: Progressing  3/22/2024 2220 by Chris Foss RN  Outcome: Progressing     Problem: ABCDS Injury Assessment  Goal: Absence of physical injury  3/23/2024 0728 by Ibeth Sharma RN  Outcome: Progressing  3/22/2024 2220 by Chris Foss RN  Outcome: Progressing     Problem: Safety - Adult  Goal: Free from fall injury  3/23/2024 0728 by Ibeth Sharma RN  Outcome: Progressing  3/22/2024 2220 by Chris Foss RN  Outcome: Progressing     Problem: Pain  Goal: Verbalizes/displays adequate comfort level or baseline comfort level  3/23/2024 0728 by Ibeth Sharma RN  Outcome: Progressing  3/22/2024 2220 by Chris Foss RN  Outcome: Progressing

## 2024-03-23 NOTE — PLAN OF CARE
Problem: Discharge Planning  Goal: Discharge to home or other facility with appropriate resources  Outcome: Progressing     Problem: Neurosensory - Adult  Goal: Achieves stable or improved neurological status  Outcome: Progressing  Goal: Achieves maximal functionality and self care  Outcome: Progressing     Problem: Confusion  Goal: Confusion, delirium, dementia, or psychosis is improved or at baseline  Description: INTERVENTIONS:  1. Assess for possible contributors to thought disturbance, including medications, impaired vision or hearing, underlying metabolic abnormalities, dehydration, psychiatric diagnoses, and notify attending LIP  2. [place] high risk fall precautions, as indicated  3. Provide frequent short contacts to provide reality reorientation, refocusing and direction  4. Decrease environmental stimuli, including noise as appropriate  5. Monitor and intervene to maintain adequate nutrition, hydration, elimination, sleep and activity  6. If unable to ensure safety without constant attention obtain sitter and review sitter guidelines with assigned personnel  7. Initiate Psychosocial CNS and Spiritual Care consult, as indicated  Outcome: Progressing     Problem: Skin/Tissue Integrity  Goal: Absence of new skin breakdown  Description: 1.  Monitor for areas of redness and/or skin breakdown  2.  Assess vascular access sites hourly  3.  Every 4-6 hours minimum:  Change oxygen saturation probe site  4.  Every 4-6 hours:  If on nasal continuous positive airway pressure, respiratory therapy assess nares and determine need for appliance change or resting period.  Outcome: Progressing     Problem: ABCDS Injury Assessment  Goal: Absence of physical injury  Outcome: Progressing     Problem: Safety - Adult  Goal: Free from fall injury  Outcome: Progressing     Problem: Pain  Goal: Verbalizes/displays adequate comfort level or baseline comfort level  Outcome: Progressing

## 2024-03-23 NOTE — PROGRESS NOTES
Handoff report and transfer of care given at bedside to Danielle RN.  Patient in stable condition, denies needs/concerns at this time.  Call light within reach.

## 2024-03-23 NOTE — PROGRESS NOTES
IM Progress Note    Admit Date:  3/21/2024       Pt admitted with acute CVA.   He has aphasia   R  sided weakness    Seen by neurology     Subjective:  Mr. Voss seen, aphasic.   Appears oriented to person.   No distress      Tele - NSR      PT, OT eval done, referral to ARU    -   Patient seen still aphasic, his exam is similar to yesterday   right-sided weakness        Objective:   BP (!) 169/69   Pulse 69   Temp 98.7 °F (37.1 °C) (Oral)   Resp 16   Ht 1.702 m (5' 7\")   Wt 95.3 kg (210 lb)   SpO2 93%   BMI 32.89 kg/m²     Intake/Output Summary (Last 24 hours) at 3/23/2024 1235  Last data filed at 3/23/2024 0035  Gross per 24 hour   Intake 0 ml   Output 400 ml   Net -400 ml           Physical Exam:  Gen: Alert. Elderly male, Patient is hard of hearing.   Aphasic    Awake,  responds appropriately   Eyes: PERRL. No sclera icterus. No conjunctival injection.   ENT: No discharge. Pharynx clear.   Neck: No JVD.  No Carotid Bruit. Trachea midline.  Resp: No accessory muscle use. No crackles. No wheezes. No rhonchi.   CV: Regular rate. Regular rhythm. No murmur.  No rub. No edema.   Peripheral Pulses: +2 palpable, equal bilaterally   GI: Non-tender. Non-distended. No masses. No organomegaly. Normal bowel sounds. No hernia.   Skin: Warm and dry. No nodule on exposed extremities. No rash on exposed extremities.   M/S: No cyanosis. No joint deformity. No clubbing.   Neuro: Awake.    ++Expressive aphasia, difficulty with word finding.   Mild right sided weakness   Psych: Awake and alert. No anxiety or agitation.  Oriented to person     Medications:   Scheduled Meds:   amLODIPine  5 mg Oral Daily    pantoprazole  40 mg Oral QAM AC    tamsulosin  0.4 mg Oral Daily    sodium chloride flush  5-40 mL IntraVENous 2 times per day    enoxaparin  40 mg SubCUTAneous Daily    aspirin  81 mg Oral Daily    Or    aspirin  300 mg Rectal Daily    atorvastatin  80 mg Oral Nightly    clopidogrel  75 mg Oral Daily

## 2024-03-23 NOTE — PLAN OF CARE
Problem: Discharge Planning  Goal: Discharge to home or other facility with appropriate resources  3/23/2024 1405 by Jason Benitez RN  Outcome: Progressing  3/23/2024 0728 by Ibeth Sharma RN  Outcome: Progressing     Problem: Neurosensory - Adult  Goal: Achieves stable or improved neurological status  3/23/2024 1405 by Jason Benitez RN  Outcome: Progressing  3/23/2024 0728 by Ibeth Sharma RN  Outcome: Progressing  Goal: Achieves maximal functionality and self care  3/23/2024 1405 by Jason Benitez RN  Outcome: Progressing  3/23/2024 0728 by Ibeth Sharma RN  Outcome: Progressing     Problem: Confusion  Goal: Confusion, delirium, dementia, or psychosis is improved or at baseline  Description: INTERVENTIONS:  1. Assess for possible contributors to thought disturbance, including medications, impaired vision or hearing, underlying metabolic abnormalities, dehydration, psychiatric diagnoses, and notify attending LIP  2. [place] high risk fall precautions, as indicated  3. Provide frequent short contacts to provide reality reorientation, refocusing and direction  4. Decrease environmental stimuli, including noise as appropriate  5. Monitor and intervene to maintain adequate nutrition, hydration, elimination, sleep and activity  6. If unable to ensure safety without constant attention obtain sitter and review sitter guidelines with assigned personnel  7. Initiate Psychosocial CNS and Spiritual Care consult, as indicated  3/23/2024 1405 by Jason Benitez RN  Outcome: Progressing  3/23/2024 0728 by Ibeth Sharma RN  Outcome: Progressing     Problem: Skin/Tissue Integrity  Goal: Absence of new skin breakdown  Description: 1.  Monitor for areas of redness and/or skin breakdown  2.  Assess vascular access sites hourly  3.  Every 4-6 hours minimum:  Change oxygen saturation probe site  4.  Every 4-6 hours:  If on nasal continuous positive airway pressure, respiratory therapy assess nares and determine need

## 2024-03-23 NOTE — PROGRESS NOTES
Took over care of this pt at this time. Pt in stable condition. Pt denies any needs. Call light within reach. Bed in low locked position.

## 2024-03-23 NOTE — FLOWSHEET NOTE
03/22/24 2115   Vital Signs   Temp 97.8 °F (36.6 °C)   Temp Source Oral   Pulse 62   Heart Rate Source Monitor   Respirations 16   BP (!) 188/71   MAP (Calculated) 110   BP Location Right upper arm   BP Method Automatic   Patient Position Semi fowlers   Pain Assessment   Pain Assessment Face, Legs, Activity, Cry, and Consolability (FLACC)   Faces, Legs, Activity, Cry, and Consolability (FLACC)   Face (F) 0   Legs (L) 0   Activity (A) 0   Cry (C) 0   Consolability (C) 0   FLACC Score  0   Opioid-Induced Sedation   POSS Score 1   RASS   [place] Agitation Sedation Scale (RASS) 0   Oxygen Therapy   SpO2 95 %   O2 Device None (Room air)     Shift assessment completed. Pt is alert to self and situation. Vital signs are WNL. Respirations are even & easy. No complaints voiced. Pt denies needs at this time. SR up x 2 and bed in low position. Call light is within reach. Will monitor.

## 2024-03-23 NOTE — PROGRESS NOTES
Bedside report given to Ibeth MOTLEY. Pt denies needs at this time. No s/s of distress. Respirations easy and unlabored. Pt stable. Bed in low position call light within reach. No further needs at this time.

## 2024-03-23 NOTE — PROGRESS NOTES
Inpatient Occupational Therapy Treatment    Unit: [facility]  Date:  3/23/2024  Patient Name:    Zana Voss  Admitting diagnosis:  Acute CVA (cerebrovascular accident) (HCC) [I63.9]  Cerebrovascular accident (CVA), unspecified mechanism (HCC) [I63.9]  Admit Date:  3/21/2024  Precautions/Restrictions/WB Status/ Lines/ Wounds/ Oxygen: Fall risk, Bed/chair alarm, Lines (IV), Confusion, [place] (hard of hearing), and Telemetry    Treatment Time:  549- 433  Treatment Number:  1  Timed Code Treatment Minutes: 40 minutes  Total Treatment Minutes:  40 minutes    Patient Goals for Therapy: none stated          Discharge Recommendations: Acute Rehab (ARU)/ Inpatient Rehab Facility (IRF)  DME needs for discharge: Defer to facility       Therapy recommendations for staff:   Assist of 2 for transfers with use of rolling walker (RW) and gait belt to/from BSC  to/from chair    History of Present Illness: Per MABEL Giordano H&P:  \"89 y.o. male with pmhx of TIA, HTN, HLD who presented to [place] ED with concern for AMS. The patient is severely aphasic at this time and also hard of hearing. He is awake and alert; however, he is only able to respond by saying \"what\", \"I don't know,\" \"yes\", \"no\", or \"Oh\" to any questions. He is unable to provide any meaningful history at this time.  The patient's daughters and son are present at bedside. Per his daughter, the patient lives with his grandson. Family noticed starting about 1 week ago, he had difficulty with speech and difficulty walking. He was able to walk on his own, but this progressively worsened until he was not longer able to walk on his own today. Starting about 1 week ago, he had some speech changes, described by family as his \"thoughts and speech not connecting,\" which also progressively worsened to the point where he was not able to speak in coherent, full sentences since yesterday. Normally speech is clear and coherent   His daughter states that he has had a headache but had recent

## 2024-03-24 ENCOUNTER — HOSPITAL ENCOUNTER (INPATIENT)
Age: 89
DRG: 057 | End: 2024-03-24
Attending: STUDENT IN AN ORGANIZED HEALTH CARE EDUCATION/TRAINING PROGRAM | Admitting: STUDENT IN AN ORGANIZED HEALTH CARE EDUCATION/TRAINING PROGRAM
Payer: MEDICARE

## 2024-03-24 VITALS
DIASTOLIC BLOOD PRESSURE: 57 MMHG | RESPIRATION RATE: 16 BRPM | HEIGHT: 67 IN | TEMPERATURE: 97.7 F | BODY MASS INDEX: 32.96 KG/M2 | WEIGHT: 210 LBS | SYSTOLIC BLOOD PRESSURE: 165 MMHG | OXYGEN SATURATION: 98 % | HEART RATE: 75 BPM

## 2024-03-24 PROBLEM — I69.30 LATE EFFECTS OF CEREBRAL ISCHEMIC STROKE: Status: ACTIVE | Noted: 2024-03-24

## 2024-03-24 LAB — SARS-COV-2 RDRP RESP QL NAA+PROBE: NOT DETECTED

## 2024-03-24 PROCEDURE — 6370000000 HC RX 637 (ALT 250 FOR IP): Performed by: INTERNAL MEDICINE

## 2024-03-24 PROCEDURE — 1280000000 HC REHAB R&B

## 2024-03-24 PROCEDURE — 6360000002 HC RX W HCPCS: Performed by: INTERNAL MEDICINE

## 2024-03-24 PROCEDURE — 92526 ORAL FUNCTION THERAPY: CPT

## 2024-03-24 PROCEDURE — 6370000000 HC RX 637 (ALT 250 FOR IP): Performed by: STUDENT IN AN ORGANIZED HEALTH CARE EDUCATION/TRAINING PROGRAM

## 2024-03-24 PROCEDURE — 6370000000 HC RX 637 (ALT 250 FOR IP): Performed by: PSYCHIATRY & NEUROLOGY

## 2024-03-24 PROCEDURE — 92523 SPEECH SOUND LANG COMPREHEN: CPT

## 2024-03-24 PROCEDURE — 6370000000 HC RX 637 (ALT 250 FOR IP): Performed by: NURSE PRACTITIONER

## 2024-03-24 PROCEDURE — 6360000002 HC RX W HCPCS: Performed by: NURSE PRACTITIONER

## 2024-03-24 PROCEDURE — 87635 SARS-COV-2 COVID-19 AMP PRB: CPT

## 2024-03-24 RX ORDER — BISACODYL 10 MG
10 SUPPOSITORY, RECTAL RECTAL DAILY PRN
Status: DISCONTINUED | OUTPATIENT
Start: 2024-03-24 | End: 2024-04-05 | Stop reason: HOSPADM

## 2024-03-24 RX ORDER — CLOPIDOGREL BISULFATE 75 MG/1
75 TABLET ORAL DAILY
Status: DISCONTINUED | OUTPATIENT
Start: 2024-03-25 | End: 2024-04-05 | Stop reason: HOSPADM

## 2024-03-24 RX ORDER — DORZOLAMIDE HYDROCHLORIDE AND TIMOLOL MALEATE 20; 5 MG/ML; MG/ML
1 SOLUTION/ DROPS OPHTHALMIC NIGHTLY
Status: DISCONTINUED | OUTPATIENT
Start: 2024-03-24 | End: 2024-04-05 | Stop reason: HOSPADM

## 2024-03-24 RX ORDER — ACETAMINOPHEN 325 MG/1
650 TABLET ORAL EVERY 4 HOURS PRN
Status: DISCONTINUED | OUTPATIENT
Start: 2024-03-24 | End: 2024-04-05 | Stop reason: HOSPADM

## 2024-03-24 RX ORDER — AMLODIPINE BESYLATE 5 MG/1
10 TABLET ORAL DAILY
Status: DISCONTINUED | OUTPATIENT
Start: 2024-03-25 | End: 2024-03-26

## 2024-03-24 RX ORDER — LATANOPROST 50 UG/ML
1 SOLUTION/ DROPS OPHTHALMIC NIGHTLY
Status: CANCELLED | OUTPATIENT
Start: 2024-03-24

## 2024-03-24 RX ORDER — ENOXAPARIN SODIUM 100 MG/ML
40 INJECTION SUBCUTANEOUS DAILY
Status: CANCELLED | OUTPATIENT
Start: 2024-03-25

## 2024-03-24 RX ORDER — ASPIRIN 81 MG/1
81 TABLET, CHEWABLE ORAL DAILY
Status: DISCONTINUED | OUTPATIENT
Start: 2024-03-25 | End: 2024-04-05 | Stop reason: HOSPADM

## 2024-03-24 RX ORDER — ASPIRIN 81 MG/1
81 TABLET, CHEWABLE ORAL DAILY
Status: CANCELLED | OUTPATIENT
Start: 2024-03-25

## 2024-03-24 RX ORDER — HYDRALAZINE HYDROCHLORIDE 10 MG/1
10 TABLET, FILM COATED ORAL EVERY 6 HOURS PRN
Status: CANCELLED | OUTPATIENT
Start: 2024-03-24

## 2024-03-24 RX ORDER — BISACODYL 10 MG
10 SUPPOSITORY, RECTAL RECTAL DAILY PRN
Status: CANCELLED | OUTPATIENT
Start: 2024-03-24

## 2024-03-24 RX ORDER — CLOPIDOGREL BISULFATE 75 MG/1
75 TABLET ORAL DAILY
Status: CANCELLED | OUTPATIENT
Start: 2024-03-25

## 2024-03-24 RX ORDER — LATANOPROST 50 UG/ML
1 SOLUTION/ DROPS OPHTHALMIC NIGHTLY
Status: DISCONTINUED | OUTPATIENT
Start: 2024-03-24 | End: 2024-04-05 | Stop reason: HOSPADM

## 2024-03-24 RX ORDER — POLYETHYLENE GLYCOL 3350 17 G/17G
17 POWDER, FOR SOLUTION ORAL DAILY PRN
Status: CANCELLED | OUTPATIENT
Start: 2024-03-24

## 2024-03-24 RX ORDER — TAMSULOSIN HYDROCHLORIDE 0.4 MG/1
0.4 CAPSULE ORAL DAILY
Status: DISCONTINUED | OUTPATIENT
Start: 2024-03-25 | End: 2024-04-05 | Stop reason: HOSPADM

## 2024-03-24 RX ORDER — ONDANSETRON 4 MG/1
4 TABLET, ORALLY DISINTEGRATING ORAL EVERY 8 HOURS PRN
Status: CANCELLED | OUTPATIENT
Start: 2024-03-24

## 2024-03-24 RX ORDER — CALCIUM POLYCARBOPHIL 625 MG 625 MG/1
625 TABLET ORAL DAILY
Status: ON HOLD | COMMUNITY
End: 2024-04-05 | Stop reason: HOSPADM

## 2024-03-24 RX ORDER — PANTOPRAZOLE SODIUM 40 MG/1
40 TABLET, DELAYED RELEASE ORAL
Status: CANCELLED | OUTPATIENT
Start: 2024-03-25

## 2024-03-24 RX ORDER — ATORVASTATIN CALCIUM 40 MG/1
80 TABLET, FILM COATED ORAL NIGHTLY
Status: CANCELLED | OUTPATIENT
Start: 2024-03-24

## 2024-03-24 RX ORDER — AMLODIPINE BESYLATE 10 MG/1
10 TABLET ORAL DAILY
Status: ON HOLD | DISCHARGE
Start: 2024-03-25

## 2024-03-24 RX ORDER — HYDRALAZINE HYDROCHLORIDE 10 MG/1
10 TABLET, FILM COATED ORAL EVERY 6 HOURS PRN
Status: DISCONTINUED | OUTPATIENT
Start: 2024-03-24 | End: 2024-04-05 | Stop reason: HOSPADM

## 2024-03-24 RX ORDER — ATORVASTATIN CALCIUM 80 MG/1
80 TABLET, FILM COATED ORAL NIGHTLY
Status: DISCONTINUED | OUTPATIENT
Start: 2024-03-24 | End: 2024-04-05 | Stop reason: HOSPADM

## 2024-03-24 RX ORDER — CLOPIDOGREL BISULFATE 75 MG/1
75 TABLET ORAL DAILY
Status: ON HOLD | DISCHARGE
Start: 2024-03-25

## 2024-03-24 RX ORDER — DORZOLAMIDE HYDROCHLORIDE AND TIMOLOL MALEATE 20; 5 MG/ML; MG/ML
1 SOLUTION/ DROPS OPHTHALMIC NIGHTLY
Status: CANCELLED | OUTPATIENT
Start: 2024-03-24

## 2024-03-24 RX ORDER — ASPIRIN 81 MG/1
81 TABLET, CHEWABLE ORAL DAILY
Status: ON HOLD | DISCHARGE
Start: 2024-03-25

## 2024-03-24 RX ORDER — PANTOPRAZOLE SODIUM 40 MG/1
40 TABLET, DELAYED RELEASE ORAL
Status: DISCONTINUED | OUTPATIENT
Start: 2024-03-25 | End: 2024-04-05 | Stop reason: HOSPADM

## 2024-03-24 RX ORDER — POLYETHYLENE GLYCOL 3350 17 G/17G
17 POWDER, FOR SOLUTION ORAL DAILY PRN
Status: DISCONTINUED | OUTPATIENT
Start: 2024-03-24 | End: 2024-04-05 | Stop reason: HOSPADM

## 2024-03-24 RX ORDER — AMLODIPINE BESYLATE 5 MG/1
10 TABLET ORAL DAILY
Status: CANCELLED | OUTPATIENT
Start: 2024-03-25

## 2024-03-24 RX ORDER — ENOXAPARIN SODIUM 100 MG/ML
40 INJECTION SUBCUTANEOUS DAILY
Status: DISCONTINUED | OUTPATIENT
Start: 2024-03-25 | End: 2024-04-04

## 2024-03-24 RX ORDER — ONDANSETRON 4 MG/1
4 TABLET, ORALLY DISINTEGRATING ORAL EVERY 8 HOURS PRN
Status: DISCONTINUED | OUTPATIENT
Start: 2024-03-24 | End: 2024-04-05 | Stop reason: HOSPADM

## 2024-03-24 RX ORDER — ATORVASTATIN CALCIUM 80 MG/1
80 TABLET, FILM COATED ORAL NIGHTLY
Status: ON HOLD | DISCHARGE
Start: 2024-03-24

## 2024-03-24 RX ORDER — TAMSULOSIN HYDROCHLORIDE 0.4 MG/1
0.4 CAPSULE ORAL DAILY
Status: CANCELLED | OUTPATIENT
Start: 2024-03-25

## 2024-03-24 RX ADMIN — HYDRALAZINE HYDROCHLORIDE 10 MG: 10 TABLET, FILM COATED ORAL at 22:50

## 2024-03-24 RX ADMIN — HYDRALAZINE HYDROCHLORIDE 10 MG: 20 INJECTION INTRAMUSCULAR; INTRAVENOUS at 05:14

## 2024-03-24 RX ADMIN — ASPIRIN 81 MG: 81 TABLET, CHEWABLE ORAL at 08:12

## 2024-03-24 RX ADMIN — AMLODIPINE BESYLATE 10 MG: 5 TABLET ORAL at 08:12

## 2024-03-24 RX ADMIN — TAMSULOSIN HYDROCHLORIDE 0.4 MG: 0.4 CAPSULE ORAL at 08:12

## 2024-03-24 RX ADMIN — ATORVASTATIN CALCIUM 80 MG: 80 TABLET, FILM COATED ORAL at 21:18

## 2024-03-24 RX ADMIN — ENOXAPARIN SODIUM 40 MG: 100 INJECTION SUBCUTANEOUS at 08:12

## 2024-03-24 RX ADMIN — PANTOPRAZOLE SODIUM 40 MG: 40 TABLET, DELAYED RELEASE ORAL at 05:16

## 2024-03-24 RX ADMIN — CLOPIDOGREL BISULFATE 75 MG: 75 TABLET ORAL at 08:12

## 2024-03-24 ASSESSMENT — PAIN DESCRIPTION - DESCRIPTORS: DESCRIPTORS: PATIENT UNABLE TO DESCRIBE

## 2024-03-24 ASSESSMENT — PAIN DESCRIPTION - ORIENTATION: ORIENTATION: RIGHT

## 2024-03-24 ASSESSMENT — PAIN DESCRIPTION - LOCATION: LOCATION: ARM

## 2024-03-24 NOTE — PLAN OF CARE
Problem: Discharge Planning  Goal: Discharge to home or other facility with appropriate resources  Outcome: Progressing     Problem: Neurosensory - Adult  Goal: Achieves stable or improved neurological status  Outcome: Progressing  Goal: Achieves maximal functionality and self care  Outcome: Progressing     Problem: Confusion  Goal: Confusion, delirium, dementia, or psychosis is improved or at baseline  Description: INTERVENTIONS:  1. Assess for possible contributors to thought disturbance, including medications, impaired vision or hearing, underlying metabolic abnormalities, dehydration, psychiatric diagnoses, and notify attending LIP  2. [place] high risk fall precautions, as indicated  3. Provide frequent short contacts to provide reality reorientation, refocusing and direction  4. Decrease environmental stimuli, including noise as appropriate  5. Monitor and intervene to maintain adequate nutrition, hydration, elimination, sleep and activity  6. If unable to ensure safety without constant attention obtain sitter and review sitter guidelines with assigned personnel  7. Initiate Psychosocial CNS and Spiritual Care consult, as indicated  Outcome: Progressing     Problem: Skin/Tissue Integrity  Goal: Absence of new skin breakdown  Description: 1.  Monitor for areas of redness and/or skin breakdown  2.  Assess vascular access sites hourly  3.  Every 4-6 hours minimum:  Change oxygen saturation probe site  4.  Every 4-6 hours:  If on nasal continuous positive airway pressure, respiratory therapy assess nares and determine need for appliance change or resting period.  Outcome: Progressing     Problem: ABCDS Injury Assessment  Goal: Absence of physical injury  Outcome: Progressing     Problem: Safety - Adult  Goal: Free from fall injury  Outcome: Progressing     Problem: Pain  Goal: Verbalizes/displays adequate comfort level or baseline comfort level  Outcome: Progressing

## 2024-03-24 NOTE — FLOWSHEET NOTE
03/24/24 0800   Vital Signs   Temp 98.4 °F (36.9 °C)   Temp Source Oral   Pulse 67   Heart Rate Source Monitor   Respirations 16   BP (!) 150/69   MAP (Calculated) 96   Patient Position Semi fowlers   Pain Assessment   Pain Assessment None - Denies Pain   Oxygen Therapy   SpO2 96 %   O2 Device None (Room air)       Shift assessment complete. See flow sheet. Scheduled meds given. See MAR.  Patients head-toe complete, VS are logged, and active bowel sound noted in all four quadrants.    Pt sitting up in bed respirations easy and unlabored. No s/s of distress. Alert and aphasic. Can answer yes and no questions garbled speech. Pt stable. Follows commands.     No further needs  noted at this time. Call light and bedside table are within reach. The bed is locked and is in the lowest position.        Jason Benitez RN

## 2024-03-24 NOTE — PROGRESS NOTES
Shift assessment complete; see flow sheet.  Scheduled medications administered; See MAR.  IV infusing without difficulty. Pt denies pain at this time. NIHSS score 9. Pt denies any needs at this time. Pt educated on use of call light and to call out with needs, verbalized understanding, bed in low locked position for pt safety'

## 2024-03-24 NOTE — PROGRESS NOTES
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   NAV orientation due to presently presenting with aphasia.  Oriented to room and call light system. Oriented to rehab routine and therapy schedules. Informed about care conferences and ordering of meals with PCA. Family was present and orientation to facility was discussed with them.     Drug / Medication Review: Yes  Medications were reviewed by RN at time of admission  [x]  No potential or actual clinically significant medication issues were noted.   []  Potential or actual clinically significant medication issues were found and MD was notified. (Specified below if applicable)   []  Allergy to medication   []  Drug interactions (drug/drug, drug/food, drug/disease interactions)   []  Duplicate drug   []  Omission (drug missing from prescribed regimen)   []  Non adherence   []  Adverse reaction   []  Wrong patient, drug, dose, route, time error   []  Ineffective drug therapy    Patient Mood Interview: Yes    Symptom Presence  0. No (enter 0 in column 2)  1. Yes (enter 0-3 in column 2)  9. No response (leave column 2 blank  Symptom Frequency  0. Never or 1 day  1. 2-6 days (several days)  2. 7-11 days (half or more days)  3. 12-14 days (nearly everyday) 1. Symptom Presence 2. Symptom Frequency    Enter scores in boxes   Little interest or pleasure in doing things   9    Feeling down, depressed, or hopeless   9    If either A or B is coded 2 or 3, CONTINUE asking the questions below.  If not, END the interview.     Trouble falling or staying asleep, or sleeping too much       Feeling tired or having little energy       Poor appetite or overeating       Feeling bad about yourself - or that you are a

## 2024-03-24 NOTE — CARE COORDINATION
DISCHARGE ORDER  Date/Time 3/24/2024 12:56 PM  Completed by: Ivory John, Case Management    Patient Name: Zana Voss    : 1931      Admit order Date and Status: 3/21/2024 Stable  Noted discharge order. (verify MD's last order for status of admission/Traditional Medicare 3 [place] Inpatient qualifying stay required for SNF)    Confirmed discharge plan with:              Patient:  Yes              When pt confirms DC plan does any support person need to be contacted by CM Yes if yes who_Larry brother_____                      Discharge to Facility: [facility]   Facility phone number for staff giving report: [phone]   Pre-certification completed: medicare   Hospital Exemption Notification (HENS) completed: NA   Discharge orders and Continuity of Care faxed to facility:  can pull from [employer]      Transportation:               Medical Transport explained with choice list offered to pt/family.                Choice:(no preference)  Agency used: [facility]   time:   1530      Pt/family/Nursing/Facility aware of  time:   Yes Names: PT, Danielle Spring RN, Mena Daniels RN, Aislinn Thurston AND NKECHI  Ambulance form completed: roundtrip       Date Last IMM Given: 3/24/2024    Comments:Reviewed chart, noted DC order, met with pt at bedside. Pt to be DC to AND ARU today, providing transportation.     Pt is being d/c'd to AND ARU today. Pt's O2 sats are 98% on RA.      Discharge timeout done with PT, Danielle Spring RN, Mena Daniels RN, Aislinn Thurston AND ARU. All discharge needs and concerns addressed.    Discharging nurse to complete JESSICA, reconcile AVS, and place final copy with patient's discharge packet. Discharging RN to ensure that written prescriptions for  Level II medications are sent with patient to the facility as per protocol.      
CM delivered second IMM to patient and brother. Verbal explanation provided of 4 hours to review notice. Brother Ronan voiced understanding and is agreeable to discharge.    
INTERDISCIPLINARY PLAN OF CARE CONFERENCE    Date/Time: 3/22/2024 3:57 PM  Completed by: Olivia Lopez RN, Case Management      Patient Name:  Zana Voss  YOB: 1931  Admitting Diagnosis: Acute CVA (cerebrovascular accident) (HCC) [I63.9]  Cerebrovascular accident (CVA), unspecified mechanism (HCC) [I63.9]     Admit Date/Time:  3/21/2024  7:45 AM    Chart reviewed. Interdisciplinary team contacted or reviewed plan related to patient progress and discharge plans.   Disciplines included Case Management, Nursing, and Dietitian.    Current Status:Stable  PT/OT recommendation for discharge plan of care: ARU    Expected D/C Disposition:  Rehab  Confirmed plan with patient and/or family Yes confirmed with: (name) Son  Met with:patient  Discharge Plan Comments: Reviewed chart and met with pt and Son and Chaim. Discussed ARU at [facility] and pt and family agreeable to referral. Referral called to Thu at ARU. Await return call on acceptance/denial. Will cont to follow.    Received call from Thu at ARU and they will accept pt at [place]. Tuh aware poss dc on Sunday.    Home O2 in place on admit: No  Pt informed of need to bring portable home O2 tank on day of discharge for nursing to connect prior to leaving:  Not Indicated  Verbalized agreement/Understanding:  Not Indicated   
[facility] - Acute Rehab Unit   After review, this patient is felt to be:       []  Appropriate for Acute Inpatient Rehab    []  Appropriate for Acute Inpatient Rehab Pending Insurance Authorization    []  Not appropriate for Acute Inpatient Rehab    [x]  Referral received and ARU reviewing patient; Evaluation ongoing. Await PT/OT evaluations       Will notify DCP with further updates. Thank you for the referral.  Thu Damon RN      [id number]  Patient with IPR recs per therapy whiteboard so is appropriate for ARU. Discussed with femi. Thu Damon RN    
Patient and/or patient representative Zana and his family were provided with a choice of provider and agrees with the discharge plan. Freedom of choice list with basic dialogue that supports the patient's individualized plan of care/goals and shares the quality data associated with the providers was provided to:     Patient Representative Name:       The Patient and/or Patient Representative Agree with the Discharge Plan?      Olivia Lopez RN  Case Management Department  Ph: [phone] Fax: [phone]

## 2024-03-24 NOTE — PLAN OF CARE
Problem: Discharge Planning  Goal: Discharge to home or other facility with appropriate resources  3/24/2024 1001 by Jason Benitez RN  Outcome: Progressing  3/24/2024 0715 by Ibeth Sharma RN  Outcome: Progressing     Problem: Neurosensory - Adult  Goal: Achieves stable or improved neurological status  3/24/2024 1001 by Jason Benitez RN  Outcome: Progressing  3/24/2024 0715 by Ibeth Sharma RN  Outcome: Progressing  Goal: Achieves maximal functionality and self care  3/24/2024 1001 by Jason Benitez RN  Outcome: Progressing  3/24/2024 0715 by Ibeth Sharma RN  Outcome: Progressing     Problem: Confusion  Goal: Confusion, delirium, dementia, or psychosis is improved or at baseline  Description: INTERVENTIONS:  1. Assess for possible contributors to thought disturbance, including medications, impaired vision or hearing, underlying metabolic abnormalities, dehydration, psychiatric diagnoses, and notify attending LIP  2. [place] high risk fall precautions, as indicated  3. Provide frequent short contacts to provide reality reorientation, refocusing and direction  4. Decrease environmental stimuli, including noise as appropriate  5. Monitor and intervene to maintain adequate nutrition, hydration, elimination, sleep and activity  6. If unable to ensure safety without constant attention obtain sitter and review sitter guidelines with assigned personnel  7. Initiate Psychosocial CNS and Spiritual Care consult, as indicated  3/24/2024 1001 by Jason Benitez RN  Outcome: Progressing  3/24/2024 0715 by Ibeth Sharma RN  Outcome: Progressing     Problem: Skin/Tissue Integrity  Goal: Absence of new skin breakdown  Description: 1.  Monitor for areas of redness and/or skin breakdown  2.  Assess vascular access sites hourly  3.  Every 4-6 hours minimum:  Change oxygen saturation probe site  4.  Every 4-6 hours:  If on nasal continuous positive airway pressure, respiratory therapy assess nares and determine need

## 2024-03-24 NOTE — PROGRESS NOTES
Speech Language Pathology  Swallowing Disorders and Dysphagia    Dysphagia Treatment/Follow-Up Note  Facility/Department: [address] MEDICAL-SURGICAL    Recommendations: SLP recommends to continue with IDDSI 7 Regular Solids; IDDSI 0 Thin Liquids; Meds whole with thin liquids  Risk Management: upright for all intake, stay upright for at least 30 mins after intake, small bites/sips, close supervision, oral care 2-3x/day to reduce adverse affects in the event of aspiration, increase physical mobility as able, STRICT aspiration precautions, and hold PO and contact SLP if s/s of aspiration or worsening respiratory status develop..     NAME:Zana Voss  : 1931 (89 y.o.)   MRN: [id number]  ROOM: [facility]  ADMISSION DATE: 3/21/2024  PATIENT DIAGNOSIS(ES): Acute CVA (cerebrovascular accident) (HCC) [I63.9]  Cerebrovascular accident (CVA), unspecified mechanism (HCC) [I63.9]  No Known Allergies    DATE ONSET: 3/21/2024    Pain: The patient does not complain of pain.        Current Diet: ADULT DIET; Regular    Diet Tolerance:  Patient tolerating current diet level without signs/symptoms of aspiration per report.       Dysphagia Treatment and Impressions:  Subjective: Pt seen in room at bedside with RN (Martita) permission.   RN Report/Chart Review: RN reported no concerns.   Patient tolerance to current diet and treatment: Pt pleasant and agreeable to tx.   Noteworthy events since last seen: None     Respiratory Status: Pt with SPO2% of 98 on RA  with RR of 16.   Oral Care Status: Adequate. Oral care completed by SLP via toothbrush and chlorhexidine.     Liquid PO Trials:   IDDSI 0 Thin:  Assessed via straw: no anterior bolus loss , suspect functional A-P bolus transit, swallow timing subjectively appears timely, and no clinical s/s of aspiration      Education: SLP edu pt re: Role of SLP, Rationale for dysphagia tx, Recommended compensatory strategies, Aspiration precautions, POC, Evidenced based practice  addressed, see above. Ongoing, progressing.         Speech/Language/Cog Goals:   See goals on eval.        Recommendations:  Solid Consistency: IDDSI 7 Regular Solids  Liquid Consistency: IDDSI 0 Thin Liquids  Medication: Meds whole with thin liquids    Plan:    Continued Dysphagia treatment with goals per plan of care.    Discharge Recommendations: Recommend SLP tx at ARU    If pt discharges from hospital prior to Speech/Swallowing discharge, this note serves as tx and discharge summary.     Total Treatment Time / Charges     Time in Time out Total Time / units   Cognitive Tx         Speech Tx      Dysphagia Tx 1330 1340 10 minutes / 1 unit      Signature:   Maribell Evangelista M.A., CF-SLP #[id number]  Speech-Language Pathologist  Desk: [phone]

## 2024-03-24 NOTE — PLAN OF CARE
Problem: Discharge Planning  Goal: Discharge to home or other facility with appropriate resources  Outcome: Progressing     Problem: Pain  Goal: Verbalizes/displays adequate comfort level or baseline comfort level  Outcome: Progressing     Problem: Safety - Adult  Goal: Free from fall injury  Outcome: Progressing     Problem: ABCDS Injury Assessment  Goal: Absence of physical injury  Outcome: Progressing  Flowsheets (Taken 3/24/2024 4178)  Absence of Physical Injury: Implement safety measures based on patient assessment     Problem: Skin/Tissue Integrity  Goal: Absence of new skin breakdown  Description: 1.  Monitor for areas of redness and/or skin breakdown  2.  Assess vascular access sites hourly  3.  Every 4-6 hours minimum:  Change oxygen saturation probe site  4.  Every 4-6 hours:  If on nasal continuous positive airway pressure, respiratory therapy assess nares and determine need for appliance change or resting period.  Outcome: Progressing

## 2024-03-24 NOTE — PLAN OF CARE
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 Admitted d/t CVA resulting in Decreased functional mobility ;Decreased ADL status;Decreased ROM;Decreased strength;Decreased safe awareness;Decreased cognition;Decreased endurance;Decreased sensation;Decreased balance;Decreased high-level IADLs;Decreased fine motor control;Decreased coordination;Decreased posture   Barriers: level of assistance, availability of assistance, endurance, comorbidities   Participation: Fair     CARE PLAN     NURSING:  Zana Voss while on this unit will:     [x] Be continent of bowel and bladder     [x] Have an adequate number of bowel movements  [] Urinate with no urinary retention >300ml in bladder  [] Complete bladder protocol with blunt removal  [] Maintain O2 SATs at ___%  [x] Have pain managed while on ARU       [] Be pain free by discharge   [x] Have no skin breakdown while on ARU  [] Have improved skin integrity via wound measurements  [] Have no signs/symptoms of infection at the wound site  [x] Be free from injury during hospitalization   [] Complete education with patient/family with understanding demonstrated for:  [x] Adjustment   [] Other:   Nursing  interventions may include bowel/bladder training, education for medical assistive devices, medication education, O2 saturation management, energy conservation, stress management techniques, fall prevention, alarms protocol, seating and positioning, skin/wound care, pressure relief instruction,dressing changes,  infection protection, DVT prophylaxis, and/or assistance with in room safety with transfers to bed, toilet, wheelchair, shower as well as bathroom activities and hygiene.     Patient/caregiver education for:   [] Disease/sustained injury/management      [x] Medication Use   [] Surgical intervention   [x] Safety   [] Body mechanics and or joint protection   [] Health maintenance         PHYSICAL THERAPY:  Goals:                  Short Term Goals  Time Frame for Short Term Goals: 4/1  Short Term Goal 1: pt will complete STS to platform walker with MAX A x2  Short Term Goal 2: pt will perform supine to sit with MOD A  Short Term Goal 3: pt will perform bed to chair with stedy and MOD A  Short Term Goal 4: pt will tolerate gait assessment if/when appropriate for LTG to be set  Short Term Goal 5: pt will propel WC 50 ft with domitila technique and MOD A            Long Term Goals  Time Frame for Long Term Goals : 4/13  Long Term Goal 1: pt will perform STS to platform walker with MOD A  Long Term Goal 2: pt will transfer bed to chair with platform walker and MAX A  Long Term Goal 3: pt will perform supine <> sit with MIN A  Long Term Goal 4: pt will propel WC 50 ft with CGA  Long Term Goal 5: gait goal to be set  These goals were reviewed with this patient at the time of assessment and Zana Voss is in agreement.     Plan of Care: Pt to be seen 5 out of 7 days per week, 60   mins (exact) per day for 21 days (exact)                   Current Treatment Recommendations: Strengthening, ROM, Balance training, Endurance training, Functional mobility training, Gait training, Patient/Caregiver education & training,  Goal: Dependent   12 Steps CARE Score: 88 Discharge Goal: Dependent    Object CARE Score: 88 Discharge Goal: Substantial/maximal assistance   Wheel 50 feet, 2 turns CARE Score: 7 Discharge Goal: Supervision or touching assistance   Wheel 150 Feet CARE Score: 7 Discharge Goal: Partial/moderate assistance          Zana Voss will be seen a minimum of 3 hours of therapy per day, a minimum of 5 out of 7 days per week.    [] In this rare instance due to the nature of this patient's medical involvement, this patient will be seen 15 hours per week (900 minutes within a 7 day period).    Treatments may include therapeutic exercises, gait training, neuromuscular re-ed, transfer training, community reintegration, bed mobility, w/c mobility and training, self care, home mgmt, cognitive training, energy conservation,dysphagia tx, speech/language/communication therapy, group therapy, and patient/family education. In addition, dietician/nutritionist may monitor calorie count as well as intake and collaboratively work with SLP on dietary upgrades.  Neuropsychology/Psychology may evaluate and provide necessary support.    Medical issues being managed closely and that require 24 hour availability of a physician:   [x] Swallowing Precautions  [x] Bowel/Bladder Fx  [] Weight bearing precautions   [] Wound Care    [] Pain Mgmt   [x] Infection Protection   [x] DVT Prophylaxis   [x] Fall Precautions  [x] Fluid/Electrolyte/Nutrition Balance   [] Voice Protection   [] Respiratory  [] Other:    Medical Prognosis: [x] Good  [] Fair    [] Guarded   Total expected IRF days 21  Anticipated discharge destination:    [] Home Independently   [] Home Modified Independent  [] Home with supervision    []SNF     [x] Other                                           Physician anticipated functional outcomes:  Recommendation of pt. Discharge pending patient progress.   IPOC brief synthesis: Zana Voss is a 89 year old male with a past

## 2024-03-24 NOTE — DISCHARGE INSTR - COC
Continuity of Care Form    Patient Name: Zana Voss   :  1931  MRN:  [id number]    Admit date:  3/21/2024  Discharge date:  3/24/24    Code Status Order: DNR-CCA   Advance Directives:     Admitting Physician:  Mirtha Bahena DO  PCP: Arnoldo Frank MD    Discharging Nurse: allyssa  Discharging Hospital Unit/Room#: 0213/0213-02  Discharging Unit Phone Number: [phone]    Emergency Contact:   Extended Emergency Contact Information  Primary Emergency Contact: Lianna Voss   [facility]  Home Phone: [phone]  Mobile Phone: [phone]  Relation: Child  Secondary Emergency Contact: Ronan Voss  Home Phone: [phone]  Relation: Child    Past Surgical History:  Past Surgical History:   Procedure Laterality Date    COLONOSCOPY N/A 2022    COLONOSCOPY POLYPECTOMY SNARE/COLD BIOPSY performed by Cody Trejo MD at [facility] ENDOSCOPY    COLONOSCOPY N/A 2022    COLONOSCOPY WITH BIOPSY performed by Cody Trejo MD at [facility] ENDOSCOPY    EYE SURGERY      cataracts merary      FEMUR SURGERY Left     eleazar    HIP SURGERY Right     right hip plate    OTHER SURGICAL HISTORY Right 2017    RIGHT ELBOW MELANOMA EXCISION AND RIGHT AXILLARY SENTINEL    UPPER GASTROINTESTINAL ENDOSCOPY N/A 2022    EGD BIOPSY performed by Cody Trejo MD at [facility] ENDOSCOPY       Immunization History:   Immunization History   Administered Date(s) Administered    COVID-19, PFIZER PURPLE top, DILUTE for use, (age 12 y+), 30mcg/0.3mL 2021, 2021    Influenza Virus Vaccine 10/01/2017       Active Problems:  Patient Active Problem List   Diagnosis Code    Malignant melanoma of skin of elbow, right (HCC) C43.61    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

## 2024-03-24 NOTE — PROGRESS NOTES
IRF JOHN Admission Assessment  [facility] Acute Rehab Unit    Patient:Zana Voss     Rehab Dx/Hx: Late effects of cerebral ischemic stroke [I69.30]   :1931  MRN:[id number]  Date of Admit: 3/24/2024     Pain Effect on Sleep:  Over the past 5 days, how much of the time has pain made it hard for you to sleep at night?  []  0. Does not apply - I have not had any pain or hurting in the past 5 days  []  1. Rarely or not at all  []  2. Occasionally  []  3. Frequently  []  4. Almost constantly  [x]  8. Unable to answer    Over the past 5 days, how often have you limited your participation in rehabilitation therapy sessions due to pain?  []  0. Does not apply - I have not received rehabilitation therapy in the past 5 days  []  1. Rarely or not at all  []  2. Occasionally  []  3. Frequently  []  4. Almost constantly  [x]  8. Unable to answer    Pain Interference with Day-to-Day Activities:  Over the past 5 days, how often have you limited your day-to-day activities (excluding rehabilitation therapy session)?  []  1. Rarely or not at all  []  2. Occasionally  []  3. Frequently  []  4. Almost constantly  [x]  8. Unable to answer      High-Risk Drug Classes: Use and Indication   Is taking: Check if the pt is taking any medications by pharmacological classification  Indication noted: If column 1 is checked, check if there is an indication noted for all meds in the drug class Is taking  (check all that apply) Indication noted (check all that apply)   Antipsychotic [] []   Anticoagulant [x] [x]   Antibiotic [] []   Opioid [] []   Antiplatelet [x] [x]   Hypoglycemic (including insulin) [] []   None of the above [] []     Special Treatments, Procedures, and Programs   Check all of the following treatments, procedures, and programs that apply on admission.  On admission (check all that apply)   Cancer Treatments    A1. Chemotherapy []   A2. IV []   A3. Oral []   A10. Other []   B1. Radiation []   Respiratory Therapies

## 2024-03-24 NOTE — PROGRESS NOTES
IV removed and discharge instructions completed. All questions were answered to patients satisfaction.   Request for transport placed, all personal belongs packed. No further needs noted.  Report called to ARU. DNR copy sent.

## 2024-03-24 NOTE — PROGRESS NOTES
Speech Language Pathology  Facility/Department: [address] MEDICAL-SURGICAL  Initial Speech/Language/Cognitive Assessment       NAME:Zana Voss  : 1931 (89 y.o.)   MRN: [id number]  ROOM: [address] [place] [address]-  ADMISSION DATE: 3/21/2024  PATIENT DIAGNOSIS(ES): Acute CVA (cerebrovascular accident) (HCC) [I63.9]  Cerebrovascular accident (CVA), unspecified mechanism (HCC) [I63.9]  Patient Active Problem List    Diagnosis Date Noted    Cerebrovascular accident (CVA) (HCC) 2024    Hyperlipidemia 2024    Hx of transient ischemic attack (TIA) 2024    Elevated serum creatinine 2024    Primary hypertension     Acute blood loss anemia     GI bleed 2022    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)     Malignant melanoma of skin of elbow, right (HCC) 2017     Past Medical History:   Diagnosis Date    COVID 2021    GERD with esophagitis     Hearing loss     [place] (hard of hearing)     Hyperlipidemia     Hypertension     Melanoma (HCC)     right elbow    TIA (transient ischemic attack)     Wears dentures     upper/lower     Past Surgical History:   Procedure Laterality Date    COLONOSCOPY N/A 2022    COLONOSCOPY POLYPECTOMY SNARE/COLD BIOPSY performed by Cody Trejo MD at [facility] ENDOSCOPY    COLONOSCOPY N/A 2022    COLONOSCOPY WITH BIOPSY performed by Cody Trejo MD at [facility] ENDOSCOPY    EYE SURGERY      cataracts merary  2010    FEMUR SURGERY Left     eleazar    HIP SURGERY Right     right hip plate    OTHER SURGICAL HISTORY Right 2017    RIGHT ELBOW MELANOMA EXCISION AND RIGHT AXILLARY SENTINEL    UPPER GASTROINTESTINAL ENDOSCOPY N/A 2022    EGD BIOPSY performed by Cody Trejo MD at [facility] ENDOSCOPY     No Known Allergies    Date of Evaluation: 3/24/2024   Evaluating Therapist: JAYASHREE Angulo    Subjective:   Chart Reviewed: : [x] Yes [] No    Onset Date: 3/21/24    Recent Results of CT of Head:  Date:  arm. He reportedly did not hit his head at that time.  Of note, the patient is hard-of-hearing at baseline per family. He was prescribed hearing aids at one point put reportedly never uses them.\"     Behavior / Cognition: alert, cooperative, confused, agitated, and does not follow commands    Primary Complaint: Pt becomes frustrated with expressive language difficulties and will gesture frustration for understanding language as well.     Pain: The patient does not complain of pain.      Vision / Hearing:  Vision: Wears glasses for reading  Hearing: Hard of hearing / hearing concerns, pt has b/l hearing aids.     Previous Level of Function:  Living Status: grandson, but more independent and only sees them in passing   Occupation: Retired  Highest Level of Education: GED, pt in  for career   Homemaking Responsibilities:   Meal Prep Responsibility: Secondary  Laundry Responsibility: Secondary  Cleaning Responsibility: Primary  Bill Paying / Finance Responsibility: Secondary  Shopping Responsibility: Secondary  Health Care Management: Secondary. Pt able to administer pills into a medication organizer, but pt's daughter report they drop off their medication.   Active : Unknown     Assessment   Cognitive Diagnosis: NAV d/t hearing barrier.   Aphasia Diagnosis: Difficult to assess d/t hearing barrier, but clinically presents with moderate to severe expressive and receptive language.   Speech Diagnosis: WFL    Communication Diagnosis: Moderate to severe communication difficulty.       Impressions: Evaluation limited to pt participation and difficulty with hard of hearing. Pt has b/l hearing aids, but pt's daughter reports pt does not like to wear them. Therefore, it is hard to determine the severity of language comprehension or rule out language comprehension d/t hearing barrier. Overall, pt presents with moderate to severe aphasia characterized by word finding difficulties, assumed language comprehension

## 2024-03-25 LAB
ANION GAP SERPL CALCULATED.3IONS-SCNC: 14 MMOL/L (ref 3–16)
BASOPHILS # BLD: 0 K/UL (ref 0–0.2)
BASOPHILS NFR BLD: 0.4 %
BUN SERPL-MCNC: 24 MG/DL (ref 7–20)
CALCIUM SERPL-MCNC: 8.3 MG/DL (ref 8.3–10.6)
CHLORIDE SERPL-SCNC: 106 MMOL/L (ref 99–110)
CO2 SERPL-SCNC: 23 MMOL/L (ref 21–32)
CREAT SERPL-MCNC: 1 MG/DL (ref 0.8–1.3)
DEPRECATED RDW RBC AUTO: 15 % (ref 12.4–15.4)
EOSINOPHIL # BLD: 0.1 K/UL (ref 0–0.6)
EOSINOPHIL NFR BLD: 1.5 %
GFR SERPLBLD CREATININE-BSD FMLA CKD-EPI: >60 ML/MIN/{1.73_M2}
GLUCOSE SERPL-MCNC: 78 MG/DL (ref 70–99)
HCT VFR BLD AUTO: 41.2 % (ref 40.5–52.5)
HGB BLD-MCNC: 13.7 G/DL (ref 13.5–17.5)
LYMPHOCYTES # BLD: 1.4 K/UL (ref 1–5.1)
LYMPHOCYTES NFR BLD: 16.5 %
MAGNESIUM SERPL-MCNC: 2.1 MG/DL (ref 1.8–2.4)
MCH RBC QN AUTO: 28.9 PG (ref 26–34)
MCHC RBC AUTO-ENTMCNC: 33.3 G/DL (ref 31–36)
MCV RBC AUTO: 86.8 FL (ref 80–100)
MONOCYTES # BLD: 0.7 K/UL (ref 0–1.3)
MONOCYTES NFR BLD: 8.6 %
NEUTROPHILS # BLD: 6.3 K/UL (ref 1.7–7.7)
NEUTROPHILS NFR BLD: 73 %
PLATELET # BLD AUTO: 232 K/UL (ref 135–450)
PMV BLD AUTO: 7.7 FL (ref 5–10.5)
POTASSIUM SERPL-SCNC: 3.4 MMOL/L (ref 3.5–5.1)
RBC # BLD AUTO: 4.75 M/UL (ref 4.2–5.9)
SODIUM SERPL-SCNC: 143 MMOL/L (ref 136–145)
WBC # BLD AUTO: 8.6 K/UL (ref 4–11)

## 2024-03-25 PROCEDURE — 85025 COMPLETE CBC W/AUTO DIFF WBC: CPT

## 2024-03-25 PROCEDURE — 97535 SELF CARE MNGMENT TRAINING: CPT

## 2024-03-25 PROCEDURE — 36415 COLL VENOUS BLD VENIPUNCTURE: CPT

## 2024-03-25 PROCEDURE — 97530 THERAPEUTIC ACTIVITIES: CPT

## 2024-03-25 PROCEDURE — 1280000000 HC REHAB R&B

## 2024-03-25 PROCEDURE — 6360000002 HC RX W HCPCS: Performed by: STUDENT IN AN ORGANIZED HEALTH CARE EDUCATION/TRAINING PROGRAM

## 2024-03-25 PROCEDURE — 97167 OT EVAL HIGH COMPLEX 60 MIN: CPT

## 2024-03-25 PROCEDURE — 92610 EVALUATE SWALLOWING FUNCTION: CPT

## 2024-03-25 PROCEDURE — 80048 BASIC METABOLIC PNL TOTAL CA: CPT

## 2024-03-25 PROCEDURE — 97163 PT EVAL HIGH COMPLEX 45 MIN: CPT

## 2024-03-25 PROCEDURE — 92523 SPEECH SOUND LANG COMPREHEN: CPT

## 2024-03-25 PROCEDURE — 83735 ASSAY OF MAGNESIUM: CPT

## 2024-03-25 PROCEDURE — 6370000000 HC RX 637 (ALT 250 FOR IP): Performed by: STUDENT IN AN ORGANIZED HEALTH CARE EDUCATION/TRAINING PROGRAM

## 2024-03-25 RX ORDER — MECOBALAMIN 5000 MCG
5 TABLET,DISINTEGRATING ORAL NIGHTLY PRN
Status: DISCONTINUED | OUTPATIENT
Start: 2024-03-25 | End: 2024-04-05 | Stop reason: HOSPADM

## 2024-03-25 RX ORDER — POTASSIUM CHLORIDE 20 MEQ/1
20 TABLET, EXTENDED RELEASE ORAL ONCE
Status: COMPLETED | OUTPATIENT
Start: 2024-03-25 | End: 2024-03-25

## 2024-03-25 RX ADMIN — ATORVASTATIN CALCIUM 80 MG: 80 TABLET, FILM COATED ORAL at 20:12

## 2024-03-25 RX ADMIN — POTASSIUM CHLORIDE 20 MEQ: 1500 TABLET, EXTENDED RELEASE ORAL at 09:39

## 2024-03-25 RX ADMIN — PANTOPRAZOLE SODIUM 40 MG: 40 TABLET, DELAYED RELEASE ORAL at 04:49

## 2024-03-25 RX ADMIN — ACETAMINOPHEN 650 MG: 325 TABLET ORAL at 20:12

## 2024-03-25 RX ADMIN — TAMSULOSIN HYDROCHLORIDE 0.4 MG: 0.4 CAPSULE ORAL at 09:39

## 2024-03-25 RX ADMIN — LATANOPROST 1 DROP: 50 SOLUTION OPHTHALMIC at 20:13

## 2024-03-25 RX ADMIN — AMLODIPINE BESYLATE 10 MG: 5 TABLET ORAL at 09:39

## 2024-03-25 RX ADMIN — ENOXAPARIN SODIUM 40 MG: 100 INJECTION SUBCUTANEOUS at 09:38

## 2024-03-25 RX ADMIN — DORZOLAMIDE HYDROCHLORIDE AND TIMOLOL MALEATE 1 DROP: 20; 5 SOLUTION/ DROPS OPHTHALMIC at 20:13

## 2024-03-25 RX ADMIN — ASPIRIN 81 MG 81 MG: 81 TABLET ORAL at 09:39

## 2024-03-25 RX ADMIN — CLOPIDOGREL BISULFATE 75 MG: 75 TABLET ORAL at 09:39

## 2024-03-25 ASSESSMENT — PAIN SCALES - GENERAL
PAINLEVEL_OUTOF10: 6
PAINLEVEL_OUTOF10: 0

## 2024-03-25 ASSESSMENT — PAIN DESCRIPTION - DESCRIPTORS: DESCRIPTORS: ACHING

## 2024-03-25 ASSESSMENT — PAIN DESCRIPTION - ORIENTATION: ORIENTATION: RIGHT;UPPER

## 2024-03-25 ASSESSMENT — PAIN DESCRIPTION - FREQUENCY: FREQUENCY: CONTINUOUS

## 2024-03-25 ASSESSMENT — PAIN DESCRIPTION - ONSET: ONSET: ON-GOING

## 2024-03-25 ASSESSMENT — PAIN DESCRIPTION - LOCATION: LOCATION: ARM

## 2024-03-25 ASSESSMENT — PAIN DESCRIPTION - PAIN TYPE: TYPE: ACUTE PAIN

## 2024-03-25 NOTE — PLAN OF CARE
At risk for skin breakdown, see Gunnar scale. Unable to repositin self in bed. Turn  and reposition every 2 hours. Heels elevated off bed. Protective barrier placed as needed. Patient kept clean and dry. Pillows used for positioning. Will continue to monitor for skin breakdown.  R upper arm dressing changed due to bleed through, large clot noted

## 2024-03-25 NOTE — PROGRESS NOTES
Comprehensive Nutrition Assessment    Type and Reason for Visit:  Initial, Consult    Nutrition Recommendations/Plan:   Continue regular diet   Encourage po intakes  Add Ensure ONS  Monitor po intakes, nutrition adequacy, weights, pertinent labs, BMs     Malnutrition Assessment:  Malnutrition Status:  At risk for malnutrition (Comment) (03/25/24 0714)    Context:  Acute Illness      Nutrition Assessment:    Consult for oral nutrition supplements. New ARU pt. Pt initially admitted with acute CVA. PMHx of HTN, HLD. Currently on a regular diet with thin liquids. History is limited d/t aphasia. Pt reports that his appetite has been poor for a while. Pt unsure of any weight changes. PO intakes variable per EMR, 0-25% to 50-75%. Pt willing to try ONS, will add to order. Encouraged po intakes. Pt had no questions. Will monitor.    Nutrition Related Findings:    Labs reviewed. No BM yet. Wound Type: None (Abrasion rt knee)       Current Nutrition Intake & Therapies:    Average Meal Intake: 0%, 1-25%, 51-75%  Average Supplements Intake: None Ordered  ADULT DIET; Regular    Anthropometric Measures:  Height: 170.2 cm (5' 7\")  Ideal Body Weight (IBW): 148 lbs (67 kg)       Current Body Weight: 87 kg (191 lb 12.8 oz),   IBW. Weight Source: Not Specified  Current BMI (kg/m2): 30                          BMI Categories: Obese Class 1 (BMI 30.0-34.9)    Estimated Daily Nutrient Needs:  Energy Requirements Based On: Kcal/kg  Weight Used for Energy Requirements: Ideal  Energy (kcal/day): 0427-2744  Weight Used for Protein Requirements: Ideal  Protein (g/day): 67-81  Method Used for Fluid Requirements: 1 ml/kcal  Fluid (ml/day): 4747-6546    Nutrition Diagnosis:   Inadequate oral intake related to inadequate protein-energy intake as evidenced by poor intake prior to admission    Nutrition Interventions:   Food and/or Nutrient Delivery: Continue Current Diet, Start Oral Nutrition Supplement  Nutrition Education/Counseling: No

## 2024-03-25 NOTE — PROGRESS NOTES
Speech Language Pathology  Facility/Department: [facility]  Initial Speech/Language/Cognitive Assessment       NAME:Zana Voss  : 1931 (89 y.o.)   MRN: [id number]  ROOM: [facility]  ADMISSION DATE: 3/24/2024  PATIENT DIAGNOSIS(ES): Late effects of cerebral ischemic stroke [I69.30]  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)     Malignant melanoma of skin of elbow, right (HCC) 2017     Past Medical History:   Diagnosis Date    COVID 2021    GERD with esophagitis     Hearing loss     [place] (hard of hearing)     Hyperlipidemia     Hypertension     Melanoma (HCC)     right elbow    TIA (transient ischemic attack)     Wears dentures     upper/lower     Past Surgical History:   Procedure Laterality Date    COLONOSCOPY N/A 2022    COLONOSCOPY POLYPECTOMY SNARE/COLD BIOPSY performed by Cody Trejo MD at [facility] ENDOSCOPY    COLONOSCOPY N/A 2022    COLONOSCOPY WITH BIOPSY performed by Cody Trejo MD at [facility] ENDOSCOPY    EYE SURGERY      cataracts merary  2010    FEMUR SURGERY Left     eleazar    HIP SURGERY Right     right hip plate    OTHER SURGICAL HISTORY Right 2017    RIGHT ELBOW MELANOMA EXCISION AND RIGHT AXILLARY SENTINEL    UPPER GASTROINTESTINAL ENDOSCOPY N/A 2022    EGD BIOPSY performed by Cody Trejo MD at [facility] ENDOSCOPY     No Known Allergies    Date of Evaluation: 3/25/2024   Evaluating Therapist: Soraida Nunez SLP    Subjective:   Chart Reviewed: : [x] Yes [] No    Onset Date: pt admitted to [facility] 24. Pt admitted to [facility] 24.     Recent Results of CT of  yesterday. Normally speech is clear and coherent\"    Pt's goals:  pt unable to express goals due to severity of aphasia      Pain: The patient does not complain of pain     Vitals/labs: (latest reading)   SpO2: 96%   RR: 16  BP: 131/75  HR: 76  O2 device: room air        Vision / Hearing:  Vision: Wears glasses for reading  Hearing: Hard of hearing / hearing concerns, Bilateral hearing aid (pt not wearing hearing aid. SLP used Pocket Talker)    Previous Level of Function:  Living Status: grandson   Occupation: Retired    Homemaking Responsibilities:   Meal Prep Responsibility: Primary  Laundry Responsibility: Primary  Cleaning Responsibility: simple   Bill Paying / Finance Responsibility: Primary  Health Care Management: Primary  Active : yes  Leisure and Hobbies: reading   Additional Comments: SLP called pt's daughter to confirm the above info. Daughter reports that pt was completing indep prior to admission. Pt used a weekly pill organizer that she would double check was filled correctly. Pt's daughter lives about 10 minutes away, pt's son lives across the driveway.       Assessment   Cognitive Diagnosis: unable to assess due to severity of aphasia   Aphasia Diagnosis: severe expressive language deficits, severe receptive language deficits   Speech Diagnosis: severe dysarthria characterized by imprecise articulation, low volume, reduced breath support       Impressions:   Pt alert and cooperative, agreeable to eval. Pt becoming somewhat frustrated during session due to difficulty with communication. Pt lives with grandson. Pt independent with meds, finances, and most household tasks PTA. The pt was administered the [place] Aphasia Screening Test (MAST) this date to assess pt's expressive and receptive language. The pt's scores are listed below. Pt's score in judged to be a severe expressive language deficit and a severe receptive language deficit when considering prior level of independence and high  questions, following commands, automatic speech tasks, repetition, auditory comprehension, naming     Education:  Patient education: Role of SLP, Rationale of eval, Results of eval, Goals, and POC  Patient Education Responses: no evidence of learning . Daughter verbalized understanding       Total Treatment Time / Charges       Time in Time out Total Time / units   Speech / Lang / Cog Eval:  1030 1115 45 min / 1 unit (SpE)     Signature:  Soraida Nunez MA CCC-SLP #[id number]  Speech Language Pathologist

## 2024-03-25 NOTE — PROGRESS NOTES
Physical Therapy  Facility/Department: [facility]  Rehabilitation Physical Therapy Initial Assessment/treatment    NAME: Zana Voss  : 1931 (89 y.o.)  MRN: [id number]  CODE STATUS: DNR-CCA    Date of Service: 3/25/24      Past Medical History:   Diagnosis Date    COVID 2021    GERD with esophagitis     Hearing loss     [place] (hard of hearing)     Hyperlipidemia     Hypertension     Melanoma (HCC)     right elbow    TIA (transient ischemic attack)     Wears dentures     upper/lower     Past Surgical History:   Procedure Laterality Date    COLONOSCOPY N/A 2022    COLONOSCOPY POLYPECTOMY SNARE/COLD BIOPSY performed by Cody Trejo MD at [facility] ENDOSCOPY    COLONOSCOPY N/A 2022    COLONOSCOPY WITH BIOPSY performed by Cody Trejo MD at [facility] ENDOSCOPY    EYE SURGERY      cataracts merary      FEMUR SURGERY Left     eleazar    HIP SURGERY Right     right hip plate    OTHER SURGICAL HISTORY Right 2017    RIGHT ELBOW MELANOMA EXCISION AND RIGHT AXILLARY SENTINEL    UPPER GASTROINTESTINAL ENDOSCOPY N/A 2022    EGD BIOPSY performed by Cody Trejo MD at [facility] ENDOSCOPY       Chart Reviewed: Yes  Patient assessed for rehabilitation services?: Yes  Family / Caregiver Present: No  Referring Practitioner: Anai Gutierrez MD  Referral Date : 24  Diagnosis: late effects of cerebral ischemic stroke  General Comment  Comments: pt found in bed and ended in bed    Restrictions:  Restrictions/Precautions: Fall Risk;Up as Tolerated  Position Activity Restriction  Other position/activity restrictions: up with assistance, R weakness, expressive aphasia     SUBJECTIVE  Subjective: cannnot verbalize (inconsistent head nods for answering)                   OBJECTIVE  Vision  Vision: Impaired  Vision Exceptions: Wears glasses for reading    Hearing  Hearing: Exceptions to WFL  Hearing Exceptions: Hard of hearing/hearing concerns    Cognition  Overall Cognitive Status:  assistance;Dependent/Total (with stedy)    Environmental Mobility  Ambulation  Surface:  (not safe this date due to heavy R lateral lean and inablility to stand without stedy)  Wheelchair Activities  Propulsion:  (pt refused transfer to )    PT Exercises  Exercise Treatment: 10 LAQ on LLE    ASSESSMENT            Activity Tolerance  Activity Tolerance: Patient tolerated evaluation without incident;Patient limited by endurance;Treatment limited secondary to agitation;Treatment limited secondary to decreased cognition    Assessment  Assessment: Pt presents to [facility] for cerebral ischemic stroke. Pt reports baseline of living with family with level entry and INDEP PTA with no AD. Pt presents below baseline for PT eval with decreased strength, decreased endurance, and decreased mobility. Pt MAX A for supine to sit. Pt has intermittent heavy R lateral lean. Pt requires cues to use bed rail for sitting balance. MAX A for stand to stedy. Pt responded \"yes\" to toileting at start of session. Unclear if that was correct because pt did not have any output. Pt unable to verbalize words coherently. Pt gets frustrated with communication. Pt requires cues to nod or shake head to answer questions. Pt infrequently would nod or shake head. After toileting pt returned to bed but refused to continue session. Pt educated on role of PT and purpose of ARU and first day expectations with therapy. Pt became agitated and session ended. See below for second session. Pt would continue to benefit from skilled PT to aid in the above deficits and a safe DC SNF when medically appropriate.  Treatment Diagnosis: decreased balance and mobility  Therapy Prognosis: Fair  Decision Making: Low Complexity  Discharge Recommendations: Subacute/Skilled Nursing Facility  PT D/C Equipment  Other: defer  PT Equipment Recommendations  Equipment Needed: No  Other: defer    CLINICAL IMPRESSION   Pt presents to [facility] for cerebral ischemic stroke. Pt reports baseline  of living with family with level entry and INDEP PTA with no AD. Pt presents below baseline for PT eval with decreased strength, decreased endurance, and decreased mobility. Pt MAX A for supine to sit. Pt has intermittent heavy R lateral lean. Pt requires cues to use bed rail for sitting balance. MAX A for stand to stedy. Pt responded \"yes\" to toileting at start of session. Unclear if that was correct because pt did not have any output. Pt unable to verbalize words coherently. Pt gets frustrated with communication. Pt requires cues to nod or shake head to answer questions. Pt infrequently would nod or shake head. After toileting pt returned to bed but refused to continue session. Pt educated on role of PT and purpose of ARU and first day expectations with therapy. Pt became agitated and session ended. See below for second session. Pt would continue to benefit from skilled PT to aid in the above deficits and a safe DC SNF when medically appropriate.    GOALS  Patient Goals   Patient Goals : did not verbalize  Short Term Goals  Time Frame for Short Term Goals: 4/1  Short Term Goal 1: pt will complete STS to platform walker with MAX A x2  Short Term Goal 2: pt will perform supine to sit with MOD A  Short Term Goal 3: pt will perform bed to chair with stedy and MOD A  Short Term Goal 4: pt will tolerate gait assessment if/when appropriate for LTG to be set  Short Term Goal 5: pt will propel WC 50 ft with domitila technique and MOD A  Long Term Goals  Time Frame for Long Term Goals : 4/13  Long Term Goal 1: pt will perform STS to platform walker with MOD A  Long Term Goal 2: pt will transfer bed to chair with platform walker and MAX A  Long Term Goal 3: pt will perform supine <> sit with MIN A  Long Term Goal 4: pt will propel WC 50 ft with CGA  Long Term Goal 5: gait goal to be set    PLAN OF CARE  Frequency: 1-2 treatment sessions per day, 5-7 days per week  Physical Therapy Plan  General Plan: 5-7 times per week  Current

## 2024-03-25 NOTE — PROGRESS NOTES
Occupational Therapy  Facility/Department: [facility]  Rehabilitation Occupational Therapy Evaluation       Date: 3/25/24  Patient Name: Zana Voss       Room: 0151/0151-01  MRN: [id number]  Account: [id number]   : 1931  (89 y.o.) Gender: male     Referring Practitioner: Dr. Gutierrez  Diagnosis: CVA       Restrictions  Restrictions/Precautions: Fall Risk;Up as Tolerated  Other position/activity restrictions: up with assistance  Required Braces or Orthoses?: No    Subjective  Subjective: Pt resting in bed upon OT arrival, agreeable to eval and treatment. Pt with minimal verbalization, only able to state \"I don't know\", \"yes\", and \"no\". /72, HR 65, SpO2 96% on RW. Denies pain.  Comments: RN approved therapy          Vitals  Temp: 98.4 °F (36.9 °C)  Pulse: 76  Respirations: 18  BP: 131/75  Oxygen Therapy  SpO2: 96 %  Pulse Oximetry Type: Intermittent  O2 Device: None (Room air)  Level of Consciousness: Alert (0)    Objective  Vision - Basic Assessment  Prior Vision: Wears glasses only for reading  Visual History: Other (add comment) (pt unable to report)  Cognition  Overall Cognitive Status: Exceptions  Arousal/Alertness: Delayed responses to stimuli  Following Commands: Inconsistently follows commands  Attention Span: Attends with cues to redirect;Difficulty attending to directions  Memory: Decreased recall of biographical Information;Decreased recall of precautions;Decreased recall of recent events;Decreased short term memory  Safety Judgement: Decreased awareness of need for assistance;Decreased awareness of need for safety  Problem Solving: Assistance required to generate solutions;Assistance required to identify errors made;Assistance required to correct errors made;Assistance required to implement solutions;Decreased awareness of errors  Insights: Decreased awareness of deficits  Initiation: Requires cues for all  Sequencing: Requires cues for all  Orientation  Overall Orientation Status:   (pt unable to verbalize answers to orientation questions)   Sensation  Overall Sensation Status: Impaired (RUE and RLE)     ROM  LUE AROM (degrees)  LUE AROM : Exceptions (L shoulder flexion ~90*, L elbow, wrist, and hand WFL)  Left Hand AROM (degrees)  Left Hand AROM: WFL  RUE PROM (degrees)  RUE PROM: Exceptions (shoulder ~90* flexion, R elbow, wrist, and hand WFL)  RUE AROM (degrees)  RUE AROM : Exceptions (pt unable to perform AROM with RUE)  Right Hand PROM (degrees)  Right Hand PROM: WFL  Right Hand AROM (degrees)  Right Hand AROM: Exceptions (Pt unable to perform AROM with RUE)    Strength   LUE Strength  Gross LUE Strength: Exceptions to WFL  L Shoulder Flex: 4-/5  L Shoulder Ext: 4-/5  L Elbow Flex: 4-/5  L Elbow Ext: 4-/5  L Wrist Flex: 4-/5  L Wrist Ext: 4-/5  L Hand General: 4-/5  RUE Strength  Gross RUE Strength: Exceptions to WFL  R Shoulder Flex: 1/5  R Shoulder Ext: 1/5  R Elbow Flex: 1/5  R Elbow Ext: 1/5  R Wrist Flex: 1/5  R Wrist Ext: 1/5  R Hand General: 1/5      Functional Mobility  Balance  Sitting Balance: Minimal assistance (R lateral lean)  Standing Balance: Dependent/Total (Debbie Stedy)  Standing Balance  Activity: Transfers to/from Debbie Stedy. Attempted to stand pt to RW x2 trials, pt unable to stand both times.  Transfers  Sit to stand: Dependent/Total (attempted sit<>stand to RW, pt unable to clear hips from bed. ModAx2 to stand to<>from Debbie Stedy)  Stand to sit: Dependent/Total (modAx2 to<>from Debbie Stedy)  Toilet Transfers  Toilet - Technique:  (Debbie Stedy)  Equipment Used: Standard bedside commode  Toilet Transfer: Dependent/Total  Shower Transfers  Shower - Transfer From: Other (Debbie Stedy)  Shower - Transfer Type: To and From  Shower - Transfer To: Transfer tub bench  Shower Transfers: Dependent (modAx2 to/from Debbie Stedy)    Social/Functional History  Lives With: Family (Grandson)  Type of Home: House  Home Layout: One level  Home Access: Level entry  Bathroom Shower/Tub:    Education Outcome Unable to verbalize; Continued education needed       Goals  Patient Goals   Patient goals : unable to verbalize this date  Short Term Goals  Time Frame for Short Term Goals: 10 days (4/2/24)  Short Term Goal 1: Pt will perform toilet transfer with modA and LRAD  Short Term Goal 2: Pt will perform TB dressing using domitila-dressing techniques with modA and AE PRN  Short Term Goal 3: Pt will perform TB bathing using domitila-techniques with modA and AE PRN  Short Term Goal 4: Pt will tolerate standing for ~3 min with modAx2 for balance and using LRAD to increase endurance for standing ADLs and funcitonal mobility  Short Term Goal 5: Pt will tolerate x10 reps of AAROM on RUE to increase strength for ADL tasks and funcitonal transfers  Long Term Goals  Time Frame for Long Term Goals : 21 days (4/13/24)  Long Term Goal 1: Pt will perform toilet transfer with CGA and LRAD  Long Term Goal 2: Pt will perform TB dressing using domitila-dressing techniques with SBA and AE PRN  Long Term Goal 3: Pt will perform TB bathing using domitila-techniques with SBA and AE PRN  Long Term Goal 4: Pt will tolerate standing for ~6 min with CGA for balance and using LRAD to increase endurance for standing ADLs and funcitonal mobility  Long Term Goal 5: Pt will tolerate x15 reps of RUE AROM to increase strength for ADL tasks and functional transfers    Assessment  Performance deficits / Impairments: Decreased functional mobility ;Decreased ADL status;Decreased ROM;Decreased strength;Decreased safe awareness;Decreased cognition;Decreased endurance;Decreased sensation;Decreased balance;Decreased high-level IADLs;Decreased fine motor control;Decreased coordination;Decreased posture  Assessment:    Per Dr. Gutierrez's 3/25 note, \"Zana Voss is a 89 year old male with a past medical history significant for TIA, HTN, HLD, and obesity who presented to [facility] ED with AMS, found to have right sided weakness and aphasia. CT head showed

## 2024-03-25 NOTE — H&P
Patient: Zana Voss  [id number]  Date: 3/25/2024      Chief Complaint: AMS    History of Present Illness/Hospital Course:  Zana Voss is a 89 year old male with a past medical history significant for TIA, HTN, HLD, and obesity who presented to [facility] ED with AMS, found to have right sided weakness and aphasia. CT head showed scattered ill-defined areas of hypodensity seen in the left parietal lobe, the largest of which is seen posteriorly. CTA head and neck showed short segment occlusion on the M1 segment of the left middle MCA and multifocal stenosis. He was determined to not be a candidate for TNK due to being outside of the time window. MRI brain showed late acute/early subacute infarct involving the left temporal lobe, left centrum semiovale, and left parietal cortex. Neurology was consulted. Hospital course was complicated by elevated Cr, elevated troponin, and leukocytosis. He was admitted to [facility] on 3/24 due to functional deficits below his baseline. Today he is seen without family present. History is limited due to aphasia.     Prior Level of Function:  Independent for self care, indoor mobility, stairs, functional cognition     Current Level of Function:  Mod assist for upper body dressing, sit to lying, lying to sitting on side of bed  Max assist for lower body dressing  Dependent for sit to stand, walk 10 feet     has a past medical history of COVID, GERD with esophagitis, Hearing loss, [place] (hard of hearing), Hyperlipidemia, Hypertension, Melanoma (HCC), TIA (transient ischemic attack), and Wears dentures.     has a past surgical history that includes eye surgery; hip surgery (Right); Femur Surgery (Left); other surgical history (Right, 12/06/2017); Upper gastrointestinal endoscopy (N/A, 2/26/2022); Colonoscopy (N/A, 2/27/2022); and Colonoscopy (N/A, 2/27/2022).     reports that he has quit smoking. His smokeless tobacco use includes chew. He reports that he does not drink alcohol and does not  at a lower level of care such as a skilled nursing facility. I have compared the patients medical and functional status at the time of the preadmission screening and the same on this date, and there are no significant changes.     By signing this document, I acknowledge that I have personally performed a full physical examination on this patient within 24 hours of admission to this inpatient rehabilitation facility and have determined the patient to be able to tolerate the above course of treatment at an intensive level for a reasonable period of time. I will be completing a detailed individualized Plan of Care for this patient by day four of the patients stay based upon the Preadmission Screen, this Post-Admission Evaluation, and the therapy evaluations.    Rehabilitation Diagnosis:  Stroke, 1.2, Right Body (L Brain)    Assessment and Plan:  Zana Voss is a 89 year old male with a past medical history significant for TIA, HTN, HLD, and obesity who presented to [facility] ED with right sided weakness and aphasia, found to have left MCA CVA. He was admitted to [facility] on 3/24 due to functional deficits below his baseline.     Left MCA CVA  - left MCA occlusion  - with right hemiparesis, aphasia  - secondary stroke prevention with asa, statin, plavix  - PT, OT, ST    Hypokalemia  - replace PRN    HTN  - norvasc    BPH  - flomax    Bowels: adjust medications as needed for regular bowel movements    Bladder: Check PVR x 3.  IMC if PVR > 200ml or if any volume is > 500 ml.     Sleep: melatonin provided prn.     PPX  DVT: lovenox  GI: pantoprazole    Follow up appointments: PCP    Anai Gutierrez MD 3/25/2024, 10:48 AM

## 2024-03-25 NOTE — ACP (ADVANCE CARE PLANNING)
ACP conversation had on 3/21/24 by Silvestre MOTLEY/CM see note below:  Rossana Su RN      Advance Care Planning      General Advance Care Planning (ACP) Conversation     Date of Conversation: 3/21/2024  Conducted with:  Healthcare Decision Maker: Next of Kin by law (only applies in absence of above) (name) Ronan Perez     Healthcare Decision Maker:    Primary Decision Maker: Ronan Voss - [phone]    Secondary Decision Maker: Lianna Voss - [phone]  Click here to complete Healthcare Decision Makers including selection of the Healthcare Decision Maker Relationship (ie \"Primary\").   Today we documented Decision Maker(s) consistent with Legal Next of Kin hierarchy.     Content/Action Overview:  Has ACP document(s) NOT on file - requested patient to provide  Reviewed DNR/DNI and patient elects DNR order - referred to ACP Clinical Specialist & placed order           Length of Voluntary ACP Conversation in minutes:  <16 minutes (Non-Billable)     Olivia Lpoez RN

## 2024-03-25 NOTE — CARE COORDINATION
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 Educate patient on team conferences. Discuss family training with the patient/family on how it is encouraged on the unit. Order for \"discharge planning\" has been addressed.          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 Lives with grandson in a one level home.   DME at home: [] Walker     [] Cane       [] RTS        [] BSC      [x] Shower Chair        [] O2       [] HHN     [] CPAP       [] BiPap      [] Hospital Bed       [] W/C        [] Other:    Medical concerns requiring training: Assist of 2 for transfers      Medication concerns:  Require financial assistance with meds? [x] No       [] If yes, please explain:   [] Yes              Services prior to admission: none   Preference for HHC or OP Therapy: [] Home health       [] Outpatient       [x] No preference   Patient's goal(s): Go home   Working or volunteering PTA? retired       Transportation needs: Ronan or Linana can assist   Has lack of transportation kept you from medical appointments, meetings, work, or ADL's? [] Yes, it has kept me from medical appointments

## 2024-03-25 NOTE — PROGRESS NOTES
Speech Language Pathology  Clinical Bedside Swallow Assessment  Facility/Department: [facility]        Recommendations:  Diet recommendation: IDDSI 7 Regular Solids; IDDSI 0 Thin Liquids; Meds whole in puree  Instrumentation: Will continue to monitor. Pt may benefit from instrumental during admission s/p CVA. Limited eval this date.   Risk management: upright for all intake, stay upright for at least 30 mins after intake, small bites/sips, close supervision, oral care 2-3x/day to reduce adverse affects in the event of aspiration, increase physical mobility as able, STRICT aspiration precautions, and hold PO and contact SLP if s/s of aspiration or worsening respiratory status develop.       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)     Malignant melanoma of skin of elbow, right (HCC) 2017     Past Medical History:   Diagnosis Date    COVID 2021    GERD with esophagitis     Hearing loss     [place] (hard of hearing)     Hyperlipidemia     Hypertension     Melanoma (HCC)     right elbow    TIA (transient ischemic attack)     Wears dentures     upper/lower     Past Surgical History:   Procedure Laterality Date    COLONOSCOPY N/A 2022    COLONOSCOPY POLYPECTOMY SNARE/COLD BIOPSY performed by Cody Trejo MD at [facility] ENDOSCOPY    COLONOSCOPY N/A 2022    COLONOSCOPY WITH BIOPSY performed by Cody HARRISON  MD Neil at [facility] ENDOSCOPY    EYE SURGERY      cataracts merary  2010    FEMUR SURGERY Left     eleazar    HIP SURGERY Right     right hip plate    OTHER SURGICAL HISTORY Right 12/06/2017    RIGHT ELBOW MELANOMA EXCISION AND RIGHT AXILLARY SENTINEL    UPPER GASTROINTESTINAL ENDOSCOPY N/A 2/26/2022    EGD BIOPSY performed by Cody Trejo MD at [facility] ENDOSCOPY     No Known Allergies    DATE ONSET: 3/24/2024 - pt admitted to ARU. Pt initially admitted to [facility] 03/21/24    Date of Evaluation: 3/25/2024   Evaluating Therapist: Soraida Nunez, SLP    Chart Reviewed: : [x] Yes [] No     Pain: The patient does not complain of pain     Current Diet: ADULT DIET; Regular      Most Recent Imaging  Chest x-ray 03/22/24  FINDINGS:  The heart is enlarged.  Mediastinum is normal.  Minimal vascular congestion  centrally.  The lungs are otherwise clear.  There are no significant skeletal  findings in the chest.  No radiopaque foreign body.     IMPRESSION:  Cardiomegaly with mild vascular congestion.  No radiopaque foreign body.  Patient is safe for MRI.      Reason for Referral  Zana Voss was referred for a bedside swallow evaluation to assess the efficiency of their swallow function, identify signs and symptoms of aspiration and make recommendations regarding safe dietary consistencies, effective compensatory strategies, and safe eating environment.    Assessment    Medical record review/interview: Per MD H&P on 03/25/24: \"Zana Voss is a 89 year old male with a past medical history significant for TIA, HTN, HLD, and obesity who presented to [facility] ED with AMS, found to have right sided weakness and aphasia. CT head showed scattered ill-defined areas of hypodensity seen in the left parietal lobe, the largest of which is seen posteriorly. CTA head and neck showed short segment occlusion on the M1 segment of the left middle MCA and multifocal stenosis. He was determined to not be a candidate for TNK due to being

## 2024-03-26 PROCEDURE — 92507 TX SP LANG VOICE COMM INDIV: CPT

## 2024-03-26 PROCEDURE — 1280000000 HC REHAB R&B

## 2024-03-26 PROCEDURE — 97110 THERAPEUTIC EXERCISES: CPT

## 2024-03-26 PROCEDURE — 97535 SELF CARE MNGMENT TRAINING: CPT

## 2024-03-26 PROCEDURE — 97116 GAIT TRAINING THERAPY: CPT

## 2024-03-26 PROCEDURE — 6360000002 HC RX W HCPCS: Performed by: STUDENT IN AN ORGANIZED HEALTH CARE EDUCATION/TRAINING PROGRAM

## 2024-03-26 PROCEDURE — 97530 THERAPEUTIC ACTIVITIES: CPT

## 2024-03-26 PROCEDURE — 97112 NEUROMUSCULAR REEDUCATION: CPT

## 2024-03-26 PROCEDURE — 6370000000 HC RX 637 (ALT 250 FOR IP): Performed by: STUDENT IN AN ORGANIZED HEALTH CARE EDUCATION/TRAINING PROGRAM

## 2024-03-26 RX ORDER — NIFEDIPINE 30 MG/1
60 TABLET, EXTENDED RELEASE ORAL DAILY
Status: DISCONTINUED | OUTPATIENT
Start: 2024-03-27 | End: 2024-03-29

## 2024-03-26 RX ORDER — CASTOR OIL AND BALSAM, PERU 788; 87 MG/G; MG/G
OINTMENT TOPICAL 2 TIMES DAILY
Status: DISCONTINUED | OUTPATIENT
Start: 2024-03-26 | End: 2024-04-05 | Stop reason: HOSPADM

## 2024-03-26 RX ADMIN — TAMSULOSIN HYDROCHLORIDE 0.4 MG: 0.4 CAPSULE ORAL at 08:32

## 2024-03-26 RX ADMIN — LATANOPROST 1 DROP: 50 SOLUTION OPHTHALMIC at 19:39

## 2024-03-26 RX ADMIN — ACETAMINOPHEN 650 MG: 325 TABLET ORAL at 06:28

## 2024-03-26 RX ADMIN — CLOPIDOGREL BISULFATE 75 MG: 75 TABLET ORAL at 08:33

## 2024-03-26 RX ADMIN — ATORVASTATIN CALCIUM 80 MG: 80 TABLET, FILM COATED ORAL at 19:38

## 2024-03-26 RX ADMIN — AMLODIPINE BESYLATE 10 MG: 5 TABLET ORAL at 08:32

## 2024-03-26 RX ADMIN — ENOXAPARIN SODIUM 40 MG: 100 INJECTION SUBCUTANEOUS at 08:33

## 2024-03-26 RX ADMIN — DORZOLAMIDE HYDROCHLORIDE AND TIMOLOL MALEATE 1 DROP: 20; 5 SOLUTION/ DROPS OPHTHALMIC at 19:39

## 2024-03-26 RX ADMIN — Medication 5 MG: at 19:38

## 2024-03-26 RX ADMIN — CASTOR OIL AND BALSAM, PERU: 788; 87 OINTMENT TOPICAL at 19:44

## 2024-03-26 RX ADMIN — PANTOPRAZOLE SODIUM 40 MG: 40 TABLET, DELAYED RELEASE ORAL at 06:28

## 2024-03-26 RX ADMIN — ASPIRIN 81 MG 81 MG: 81 TABLET ORAL at 08:32

## 2024-03-26 ASSESSMENT — PAIN SCALES - GENERAL
PAINLEVEL_OUTOF10: 0
PAINLEVEL_OUTOF10: 5

## 2024-03-26 ASSESSMENT — PAIN SCALES - WONG BAKER
WONGBAKER_NUMERICALRESPONSE: NO HURT
WONGBAKER_NUMERICALRESPONSE: NO HURT

## 2024-03-26 ASSESSMENT — PAIN DESCRIPTION - DESCRIPTORS: DESCRIPTORS: ACHING

## 2024-03-26 ASSESSMENT — PAIN DESCRIPTION - LOCATION: LOCATION: ARM

## 2024-03-26 ASSESSMENT — PAIN DESCRIPTION - ORIENTATION: ORIENTATION: RIGHT

## 2024-03-26 NOTE — PROGRESS NOTES
Morning assessments and vital signs complete. Patient is alert and able to follow commands. His speech is incomprehensible. Patient cleansed of incontinent episode with assistance of 2. Scheduled medications given as prescribed. Call light is within reach, bed alarm is on.

## 2024-03-26 NOTE — CONSULTS
PALLIATIVE MEDICINE CONSULTATION     Patient name:Zana Voss   MRN:[id number]    :1931  Room/Bed:0151/0151-01   LOS: 3 days         Date of consult:3/27/2024    Inpatient consult to Palliative Care  Consult performed by: Debbie Monroy APRN - CNP  Consult ordered by: Anai Gutierrez MD              ASSESSMENT/RECOMMENDATIONS     89 y.o. male with recent left MCA CVA.  Admitted to ARU 3/24/24 for functional deficits below his baseline.      Symptom Management:  Goals of Care- Maximize function and continue with disease-modifying care.  Family express feeling overwhelmed with no experience or knowledge of strokes or what to expect in patient's medical course and recovery ahead.  The importance of goals of care discussions was explained and was well received, but fruitful discussion will likely take time as family works to better understand the concerns and options. Patient not likely to accept living in a SNF, but family may consider veterans home.  Daughter is asking for assistance in learning how to obtain patient's benefit information (private and VA) as finances may weigh into decisions.  Code status amended to Limited x 4 Nos.  Will follow for further discussion    Debility - due to acute CVA with right hemiparesis and aphasia.  Admitted to ARU 3/25/24 and is making progress, although not consistently working with therapy.  At this time it does not appear patient will be able to return home independently and will need continued therapy and reassessment for best plan going forward.   Family does not believe patient will be readily accepting of SNF placement.         Patient/Family Goals of Care :        No family at bedside.  Called son, left voice mail.  Called daughter, Lianna.  Explained role of Palliative Care.  Lianna agreeable to discussion with me today. Per Lianna:    HPOA is Ronan.  Patient lives with Lianna and her son.  Lianna is semi-retired and works  SNF. She thinks he would give up on life if he was put in a nursing home.  If he had to go to one, she would probably want him to go to the [facility] near them, but lamented they have a waiting list.  Encouraged her to consider putting his name on that list ASAP to optimize his choices.   They have considered remodeling a bathroom in his home to assist him living there, but that will take time and they are not sure about the finances.  Lianna is concerned they might spend money on remodeling that he could end up needed for health care.    The goals at this time are to maximize his function.  He would never want aggressive care such as a feeding tube or life support.  She confirms he is a DNR/DNI.      Explained I would let CM know of her concerns about benefits and figuring out who/how to contact the VA for assistance.  Patient will continue to work with therapy here.  In the meantime she and Ronan should start having discussions about the topics we covered today.  Offered to have family meeting with them all.      Total ACP discussion time: 90 minutes      Disposition/Discharge Plan:   - code status changed to Limited x 4 Nos  - will continue to follow for further [facility] discussions    Advance Directives:      The patient has appointed the following active healthcare agents:    Primary Decision Maker: Pietro Vossry - Child - [phone]    Secondary Decision Maker: VladskiLanna whittaker - Child - [phone]    Supplemental (Other) Decision Maker: Anahi Lange - Child - [phone]    The Patient has the following current code status:    Code Status: Limited x 4 nos      Interactive exchange regarding medications,tests and procedures with: patient, floor RN, case management, hospitalist,     Thank you for allowing us to participate in the care of this patient.      HISTORY     CC: stroke  HPI: The patient is a 89 y.o. male with pmhx of TIA, HTN, HLD who presented to [facility] ED with concern for AMS. The

## 2024-03-26 NOTE — PLAN OF CARE
Problem: Pain  Goal: Verbalizes/displays adequate comfort level or baseline comfort level  3/26/2024 0903 by Mery Clinton, RN  Outcome: Progressing  3/25/2024 2200 by Leanna Paris, RN  Outcome: Progressing

## 2024-03-26 NOTE — PROGRESS NOTES
03/26/24 1605   Encounter Summary   Encounter Overview/Reason  Initial Encounter   Last Encounter    (3/26: spoke with RN, stated pt has garbled speech/only makes noises at this time. understands spoken word, has low level of participation in therapies. best time to visit is between 11-12p when [facility] visits. pt sleeping soundly at time of  rounding)   Begin Time 1555   End Time  1606   Total Time Calculated 11 min     Thank you for consulting Spiritual Health    If you would like a 's presence for emotional, spiritual, grief or comfort care,   please dial \"0\" and ask for the  on-call to be paged.    For help with Advanced Care Planning, Power of  for Healthcare or Living Will forms, you may also call us directly:    [phone] ([phone]) Marcos  [phone]) Jhoana  [phone]) Outpatient    - Rex Moreno    [facility]

## 2024-03-26 NOTE — PLAN OF CARE
Assessed pt's pain on 0 -10 scale. Repositioned, provided emotional support, and administered pain medication. Call light within reach, will continue to monitor.

## 2024-03-26 NOTE — PROGRESS NOTES
Zana Voss  3/26/2024  [id number]    Chief Complaint: Late effects of cerebral ischemic stroke    Subjective:   No acute events overnight. Today Zana is seen with Speech therapy. He is declining to work with therapy today. History is limited from patient due to aphasia. Encouraged patient to try to work with therapies in order to get home.     ROS: unable to reliably obtain    Objective:  Patient Vitals for the past 24 hrs:   BP Temp Temp src Pulse Resp SpO2   03/26/24 0829 (!) 156/73 98.2 °F (36.8 °C) Oral 71 17 94 %   03/25/24 2012 (!) 168/70 98.6 °F (37 °C) Oral 73 16 93 %     Gen: No distress, seated up in bed  HEENT: Normocephalic, atraumatic.   CV: extremities well perfused  Resp: No respiratory distress.   Abd: Soft, nondistended  Ext: No edema.   Neuro: Alert, aphasic    Wt Readings from Last 3 Encounters:   03/24/24 87 kg (191 lb 12.8 oz)   03/21/24 95.3 kg (210 lb)   08/03/23 95.3 kg (210 lb)       Laboratory data:   Lab Results   Component Value Date    WBC 8.6 03/25/2024    HGB 13.7 03/25/2024    HCT 41.2 03/25/2024    MCV 86.8 03/25/2024     03/25/2024       Lab Results   Component Value Date/Time     03/25/2024 06:31 AM    K 3.4 03/25/2024 06:31 AM     03/25/2024 06:31 AM    CO2 23 03/25/2024 06:31 AM    BUN 24 03/25/2024 06:31 AM    CREATININE 1.0 03/25/2024 06:31 AM    GLUCOSE 78 03/25/2024 06:31 AM    CALCIUM 8.3 03/25/2024 06:31 AM        Therapy progress:  Physical therapy:  Bed Mobility:     Sit>supine:     Supine>sit:     Transfers:     Sit>stand:     Stand>sit:     Bed<>chair     Stand Pivot:     Lateral transfer:     Car transfer:     Ambulation:     Stairs:     Curb:     Wheelchair:       Occupational therapy:   Feeding     Grooming/Oral Hygiene     UE Bathing     LE Bathing     UE Dressing     LE Dressing     Putting On/Taking Off Footwear     Toileting       Speech therapy:    ADULT DIET; Regular  ADULT ORAL NUTRITION SUPPLEMENT; Lunch; Standard High Calorie/High  Protein Oral Supplement    Body mass index is 30.04 kg/m².    Assessment and Plan:  Zana Voss is a 89 year old male with a past medical history significant for TIA, HTN, HLD, and obesity who presented to [facility] ED with right sided weakness and aphasia, found to have left MCA CVA. He was admitted to [facility] on 3/24 due to functional deficits below his baseline.      Left MCA CVA  - left MCA occlusion  - with right hemiparesis, aphasia  - secondary stroke prevention with asa, statin, plavix  - PT, OT, ST    Goals of Care  - consult Palliative, appreciate assistance     Hypokalemia  - replace PRN     HTN  - switch amlodipine to nifedipine     BPH  - flomax     Bowels: adjust medications as needed for regular bowel movements     Bladder: Check PVR x 3.  IMC if PVR > 200ml or if any volume is > 500 ml.      Sleep: melatonin provided prn.      PPX  DVT: lovenox  GI: pantoprazole     Follow up appointments: PCP    Therapy progress: max assist for sit to stand to be Gutierrez MD 3/26/2024, 10:40 AM

## 2024-03-26 NOTE — PATIENT CARE CONFERENCE
Ongoing, Limited by reduced participation    Short Term Goals  Time Frame for Short Term Goals: (18 days; 04/10/24)  Goal 1: Pt will complete automatic speech tasks with 50% acc given mod cues 3/27/2024 : Ongoing, Limited by reduced participation  Goal 2: Pt will complete basic expressive language tasks (e.g. object naming, phrase completion) with 50% acc given mod cues 3/27/2024 : Ongoing, Limited by reduced participation  Goal 3: Pt will complete basic receptive language tasks (e.g. object ID, yes/no responses, following commands) with 50% acc given mod cues 3/27/2024 : Ongoing, Limited by reduced participation  Goal 4: Pt will complete simple repetition tasks with 50% acc given mod cues 3/27/2024 : Ongoing, Limited by reduced participation      ST Assessment:  Session 1: Pt presented with severe dysarthria- pt mostly unintelligible with SLP only able to comprehend pt stating \"no\" and \"I don't.\" SLP attempted to present items and provided yes/no questions to promote communication. Pt with inconsistent yes/no responses at times, but able to indicate needs as noted by yelling \"no\" at SLP when dentures presented and turning head away. Pt refused to participate in majority of tasks presented, as noted by pt shaking head \"no\" even when tasks modified to promote comprehension. Pt repeatedly yelled \"bank\" with SLP eventually determining pt wanted blanket. SLP provided extensive edu re: SLP role and rationale of therapy. MD present during session and also educated pt. However, pt refused to continue session after 20 min.      Session 2: Pt refused session initially scheduled from [id number], with SLP re-attempting for a third time and pt agreeable. Son at bedside and provided biographical information pertaining to pt. SLP facilitated extensive pt and caregiver edu re: role of SLP, strategies to promote communication effectiveness, aphasia and dysarthria dx and its indications.  Pt answered yes/no questions pertaining to  location on 2/2 opp, but required max cues to answer questions pertaining to biographical information. Pt continued to refuse to participate in repetition tasks this session. Pt repeatedly pointed to object's in SLPs R hand during object recognition task from Fo2. Pt quickly became frustrated with tasks and required frequent breaks to promote participation. Recommend 24 hr supervision and assistance at D/C.     NUTRITION  Weight - Scale: 87 kg (191 lb 12.8 oz) / Body mass index is 30.04 kg/m².  Diet Order: ADULT DIET; Regular  ADULT ORAL NUTRITION SUPPLEMENT; Lunch; Standard High Calorie/High Protein Oral Supplement  PO Meals Eaten (%): 0% (declined)  Malnutrition Status: At risk for malnutrition (Comment)  Nutrition Education/Counseling: No recommendation at this time, Education declined      CASE MANAGEMENT  Assessment: 89 yr old male. Dx:Late effects of cerebral ischemic stroke. Independent PLOF w/o AD. Lives with grandson in a one level home. Patient owns shower chair. Therapy recommendations are Subacute/Skilled Nursing Facility. DME will be deferred to SNF.         Interdisciplinary Goals:   1.) Pt will complete toilet transfer with mod A of 2.  2.) pt will tolerate gait and/or wc assessment  3.) Pt will implement strategies to promote communication effectiveness of wants/needs across caregivers   4.)  5.)      []  Family Training discussed at conference and to be scheduled.      Discharge Plan   Estimated discharge date: 4/13/24  Destination: HHC vs. SNF  Pass:No  Services at Discharge: HHC vs. SNF PT, OT, SLP, Nursing  Equipment at Discharge: CTA    Team Members Present at Conference:  : Rossana Su RN    Occupational Therapist: Chema Paula OTR/L  Physical Therapist: Vilma Kimbrough PT, DPT   Speech Therapist: Soraida Nunez MA CCC-SLP  Nurse: Kevin Guerra RN  Dietician: Natalya Loera RD, LD  : [place], OTR/L  Psychiatry: N/A    Family members present at conference:

## 2024-03-26 NOTE — PROGRESS NOTES
Physical Therapy  Facility/Department: [facility]  Rehabilitation Physical Therapy Treatment Note    NAME: Zana Voss  : 1931 (89 y.o.)  MRN: [id number]  CODE STATUS: DNR-CCA    Date of Service: 3/26/24       Restrictions:  Restrictions/Precautions: Fall Risk, Up as Tolerated  Position Activity Restriction  Other position/activity restrictions: up with assistance, R weakness, expressive aphasia     SUBJECTIVE  Subjective  Subjective: pt found in bed  Pain: indicated R sided pain, unable to rate 2* severe aphasia          OBJECTIVE  Cognition  Overall Cognitive Status: Exceptions  Arousal/Alertness: Delayed responses to stimuli  Following Commands: Inconsistently follows commands  Attention Span: Attends with cues to redirect;Difficulty attending to directions  Memory: Decreased recall of biographical Information;Decreased recall of precautions;Decreased recall of recent events;Decreased short term memory  Safety Judgement: Decreased awareness of need for assistance;Decreased awareness of need for safety  Problem Solving: Assistance required to generate solutions;Assistance required to identify errors made;Assistance required to correct errors made;Assistance required to implement solutions;Decreased awareness of errors  Insights: Decreased awareness of deficits  Initiation: Requires cues for all  Sequencing: Requires cues for all  Orientation  Orientation Level: Unable to assess    Functional Mobility  Supine to Sit  Assistance Level: Moderate assistance;Requires x 2 assistance  Skilled Clinical Factors: with HOB elevated, mod A of 2  Scooting  Assistance Level: Maximum assistance  Skilled Clinical Factors: to scoot to EOB  Balance  Sitting Balance: Minimal assistance  Standing Balance: Dependent/Total  Standing Balance  Activity: pt sat EOb while eating a few bites of food, minimal R sided lean with CGA-min A to maintain midlne. pt standing statically with LUE support on bed rail, mod A of 1 + min-mod A

## 2024-03-26 NOTE — PROGRESS NOTES
to continue gait with cues from PT only at LEs.  Trunk Control: Pt demonstrated increased forward flexed posture for standing tasks, with good ability to extend trunk with min A and TCs. Pt required assist at R hip for extension and L trunk/chest for extension.  Functional Movement Patterns: Pt attempted to grasp bean bag, cone, ball in stance in parallel bars, pt able to grasp with LUE with max VCs and demonstration but unable to toss with all attempts     Assessment  Assessment  Assessment: Pt agreeable to OT/PT session. Pt demonstrated improved posture and strength this date for transfers with use of Debbie Stedy and min A of 1 and able to stand without Stedy for mod A of 2. Pt completed mobility in parallel bars with max A from PT for support at R knee, weight shifting at hips, and advancing RLE while OT assisted with RUE management and trunk posture. Pt completed eating at EOB with CGA/min A for sitting balance but pt only taking 1 bite of food and a few sips of water. Pt required cues to visually scan to R side of tray. Pt performed standing for 10-20 seconds at EOB for donning pants and up to 2 minutes in parallel bars for mobility. Pt completed 30 minute individual session with static standing balance task in parallel bars with reaching for items on table top in parallel bars. Pt demonstrated fair standing tolerance to stand for ~4 minutes x2 with mod/max A of OT at RLE, hips, and trunk. Pt unable to toss items but grasped and reached forward out of MICHAEL for 5/5 items with max VCs and demonstration by OT. Pt completed grooming seated at sink with mod A but refused to put dentures in this date. Pt assisted to recliner with mod A in Debbie Stedy at end of session. Continue POC.  Activity Tolerance: Patient tolerated treatment well  Discharge Recommendations: Subacute/Skilled Nursing Facility  OT Equipment Recommendations  Other: TBD  Safety Devices  Safety Devices in place: Yes  Type of devices: Gait belt;Call  light within reach;Chair alarm in place;Left in chair;Nurse notified    Patient Education  Education  Education Given To: Patient;Family  Education Provided: Role of Therapy;Plan of Care;Precautions;Safety;Transfer Training;Equipment;ADL Function  Education Provided Comments: role of OT, safety with transfers, midline posture, reaching out of MICHAEL, standing posture  Education Method: Demonstration;Verbal  Barriers to Learning: Cognition;Hearing  Education Outcome: Unable to verbalize;Unable to demonstrate understanding;Continued education needed  Skilled Clinical Factors: Son present at end of session and educated on use and purpose of Debbie Stedy and positioning for RUE in chair/bed.    Plan  Occupational Therapy Plan  Times Per Week: 5-7x/wk  Current Treatment Recommendations: Strengthening;ROM;Balance training;Functional mobility training;Endurance training;Wheelchair mobility training;Neuromuscular re-education;Cognitive reorientation;Safety education & training;Patient/Caregiver education & training;Equipment evaluation, education, & procurement;Modalities;Positioning;Self-Care / ADL;Home management training;Cognitive/Perceptual training;Coordination training;Co-Treatment;Sensory integration    Goals  Short Term Goals  Time Frame for Short Term Goals: 10 days (4/2/24)  Short Term Goal 1: Pt will perform toilet transfer with modA and LRAD  Short Term Goal 2: Pt will perform TB dressing using domitila-dressing techniques with modA and AE PRN  Short Term Goal 3: Pt will perform TB bathing using domitila-techniques with modA and AE PRN  Short Term Goal 4: Pt will tolerate standing for ~3 min with modAx2 for balance and using LRAD to increase endurance for standing ADLs and functional mobility  Short Term Goal 5: Pt will tolerate x10 reps of AAROM on RUE to increase strength for ADL tasks and funcitonal transfers  Long Term Goals  Time Frame for Long Term Goals : 21 days (4/13/24)  Long Term Goal 1: Pt will perform toilet  transfer with CGA and LRAD  Long Term Goal 2: Pt will perform TB dressing using domitila-dressing techniques with SBA and AE PRN  Long Term Goal 3: Pt will perform TB bathing using domitila-techniques with SBA and AE PRN  Long Term Goal 4: Pt will tolerate standing for ~6 min with CGA for balance and using LRAD to increase endurance for standing ADLs and funcitonal mobility  Long Term Goal 5: Pt will tolerate x15 reps of RUE AROM to increase strength for ADL tasks and functional transfers    Therapy Time   Individual Concurrent Group Co-treatment   Time In 1300     1230   Time Out 1330     1300   Minutes 30     30   Timed Code Treatment Minutes: 60 Minutes       Chema Paula, OT

## 2024-03-26 NOTE — PROGRESS NOTES
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]    Precautions: falls and aspirations  Home situation: Home with grandson; retired; primary for medications, finances, and homemaking tasks PTA; active  PTA  ST Dx: [x] Aphasia  [x] Dysarthria  [] Apraxia   [x] Oropharyngeal dysphagia [] Cognitive Impairment  [] Other:   Date of Admit: 3/24/2024  Room #: 0151/0151-01    Current functional status (updated daily):         Pt being seen for : [x] Speech/Language Treatment  [x] Dysphagia Treatment [] Cognitive Treatment  [] Other:  Communication: []WFL  [x] Aphasia  [x] Dysarthria  [] Apraxia  [] Pragmatic Impairment [] Non-verbal  [] Hearing Loss  [] Other:   Cognition: [] WFL  [] Mild  [] Moderate  [] Severe [x] Unable to Assess d/t severity of aphasia  [] Other:  Memory: [] WFL  [] Mild  [] Moderate  [] Severe [x] Unable to Assess d/t severity of aphasia  [] Other:  Behavior: [] Alert  [] Cooperative  []  Pleasant  [] Confused  [x] Agitated  [x] Uncooperative  [] Distractible [] Motivated  [] Self-Limiting [] Anxious  [] Other:  Endurance:  [] Adequate for participation in SLP sessions  [x] Reduced overall  [x] Lethargic  [] Other:  Safety: [] No concerns at this time  [x] Reduced insight into deficits  [x]  Reduced safety awareness [] Not following call light procedures  [] Unable to Assess  [] Other:    Current Diet Order:ADULT DIET; Regular  ADULT ORAL NUTRITION SUPPLEMENT; Lunch; Standard High Calorie/High Protein Oral Supplement  Swallowing Precautions: upright for all intake, stay upright for at least 30 mins after intake, small bites/sips, close supervision, oral care 2-3x/day to reduce adverse affects in the event of aspiration, increase physical mobility as able, STRICT aspiration precautions, and hold PO and contact

## 2024-03-26 NOTE — PROGRESS NOTES
Spoke to referring provider.  Patient did participate with PT/OT later today.  Just not working with SLP at this time.    ARU will discuss at team meeting tomorrow.    Will follow for recommendations after team meeting to assist in anticipated GOC discussions with patient and family

## 2024-03-26 NOTE — DISCHARGE SUMMARY
Name:  Zana Voss  Room:  0213/0213-02  MRN:    [id number]    Discharge Summary      This discharge summary is in conjunction with a complete physical exam done on the day of discharge.    Discharging Physician: Dr. Jenkins      Admit: 3/21/2024  Discharge:  3/24/2024    HPI taken from admission H&P:    The patient is a 89 y.o. male with pmhx of TIA, HTN, HLD who presented to [facility] ED with concern for AMS. The patient is severely aphasic at this time and also hard of hearing. He is awake and alert; however, he is only able to respond by saying \"what\", \"I don't know,\" \"yes\", \"no\", or \"Oh\" to any questions. He is unable to provide any meaningful history at this time.  The patient's daughters and son are present at bedside. Per his daughter, the patient lives with his grandson. Family noticed starting about 1 week ago, he had difficulty with speech and difficulty walking. He was able to walk on his own, but this progressively worsened until he was not longer able to walk on his own today. Starting about 1 week ago, he had some speech changes, described by family as his \"thoughts and speech not connecting,\" which also progressively worsened to the point where he was not able to speak in coherent, full sentences since yesterday. Normally speech is clear and coherent   His daughter states that he has had a headache but had recent upper respiratory symptoms, so they weren't sure if the headache was related to his recent illness.   His daughter does mention that he had slid out of his chair today. He has a skin tear to his right arm. He reportedly did not hit his head at that time.  Of note, the patient is hard-of-hearing at baseline per family. He was prescribed hearing aids at one point put reportedly never uses them.     Diagnoses this Admission and Hospital Course   #Acute left CVA  #Aphasia, right sided deficits   #Hx of TIA  -CTA with left MCA short segment occlusion, P2 stenoses of posterior cerebral arteries,  multifocal stenosis of left vertebral artery   -symptoms originally started 1 week ago   -stroke team contacted in ED, no acute intervention   -MRI - subacute left temporal CVA    -echo  normal EF  - seen by neurology  -cont ASA, plavix, statin  -PT/OT/speech eval - needs acute rehab. CM following     Patient seen today   he is stable  DC planning to ARU     #Elevated Cr   -Cr baseline ~0.9,1.3 on presentation  -IVF   -monitor renal function    Crtn improved and down to 0.9 today. DC IVF     #Elevated troponin   -25-->17   -EKG without acute ischemic changes      #Leukocytosis   -reactive   - monitor CBC  -urine studies pending      #HTN   -held norvasc initially for permissive HTN   - resumed on norvasc now   -labetalol prn/ hydralazine prn. Monitor BP      Procedures (Please Review Full Report for Details)  None     Consults    IP CONSULT TO STROKE TEAM  IP CONSULT TO NEUROLOGY      Physical Exam at Discharge:    BP (!) 165/57   Pulse 75   Temp 97.7 °F (36.5 °C) (Oral)   Resp 16   Ht 1.702 m (5' 7\")   Wt 95.3 kg (210 lb)   SpO2 98%   BMI 32.89 kg/m²   Gen: Alert. Elderly male, Patient is hard of hearing.   Aphasic    Awake,  responds appropriately   Eyes: PERRL. No sclera icterus. No conjunctival injection.   ENT: No discharge. Pharynx clear.   Neck: No JVD.  No Carotid Bruit. Trachea midline.  Resp: No accessory muscle use. No crackles. No wheezes. No rhonchi.   CV: Regular rate. Regular rhythm. No murmur.  No rub. No edema.   Peripheral Pulses: +2 palpable, equal bilaterally   GI: Non-tender. Non-distended. No masses. No organomegaly. Normal bowel sounds. No hernia.   Skin: Warm and dry. No nodule on exposed extremities. No rash on exposed extremities.   M/S: No cyanosis. No joint deformity. No clubbing.   Neuro: Awake.    ++Expressive aphasia, difficulty with word finding.   Mild right sided weakness   Psych: Awake and alert. No anxiety or agitation.  Oriented to person       Data:  CBC:       Recent

## 2024-03-27 PROCEDURE — 97535 SELF CARE MNGMENT TRAINING: CPT

## 2024-03-27 PROCEDURE — 92507 TX SP LANG VOICE COMM INDIV: CPT

## 2024-03-27 PROCEDURE — 97530 THERAPEUTIC ACTIVITIES: CPT

## 2024-03-27 PROCEDURE — 6360000002 HC RX W HCPCS: Performed by: STUDENT IN AN ORGANIZED HEALTH CARE EDUCATION/TRAINING PROGRAM

## 2024-03-27 PROCEDURE — 97116 GAIT TRAINING THERAPY: CPT

## 2024-03-27 PROCEDURE — 97112 NEUROMUSCULAR REEDUCATION: CPT

## 2024-03-27 PROCEDURE — 6370000000 HC RX 637 (ALT 250 FOR IP): Performed by: STUDENT IN AN ORGANIZED HEALTH CARE EDUCATION/TRAINING PROGRAM

## 2024-03-27 PROCEDURE — 1280000000 HC REHAB R&B

## 2024-03-27 PROCEDURE — 97542 WHEELCHAIR MNGMENT TRAINING: CPT

## 2024-03-27 RX ADMIN — TAMSULOSIN HYDROCHLORIDE 0.4 MG: 0.4 CAPSULE ORAL at 09:37

## 2024-03-27 RX ADMIN — NIFEDIPINE 60 MG: 30 TABLET, FILM COATED, EXTENDED RELEASE ORAL at 09:36

## 2024-03-27 RX ADMIN — LATANOPROST 1 DROP: 50 SOLUTION OPHTHALMIC at 21:42

## 2024-03-27 RX ADMIN — PANTOPRAZOLE SODIUM 40 MG: 40 TABLET, DELAYED RELEASE ORAL at 05:31

## 2024-03-27 RX ADMIN — DORZOLAMIDE HYDROCHLORIDE AND TIMOLOL MALEATE 1 DROP: 20; 5 SOLUTION/ DROPS OPHTHALMIC at 21:31

## 2024-03-27 RX ADMIN — ACETAMINOPHEN 650 MG: 325 TABLET ORAL at 05:31

## 2024-03-27 RX ADMIN — ATORVASTATIN CALCIUM 80 MG: 80 TABLET, FILM COATED ORAL at 21:26

## 2024-03-27 RX ADMIN — ASPIRIN 81 MG 81 MG: 81 TABLET ORAL at 09:36

## 2024-03-27 RX ADMIN — ACETAMINOPHEN 650 MG: 325 TABLET ORAL at 10:14

## 2024-03-27 RX ADMIN — Medication 5 MG: at 21:26

## 2024-03-27 RX ADMIN — CASTOR OIL AND BALSAM, PERU: 788; 87 OINTMENT TOPICAL at 21:26

## 2024-03-27 RX ADMIN — ACETAMINOPHEN 650 MG: 325 TABLET ORAL at 23:19

## 2024-03-27 RX ADMIN — ENOXAPARIN SODIUM 40 MG: 100 INJECTION SUBCUTANEOUS at 09:37

## 2024-03-27 RX ADMIN — CLOPIDOGREL BISULFATE 75 MG: 75 TABLET ORAL at 10:14

## 2024-03-27 RX ADMIN — CASTOR OIL AND BALSAM, PERU: 788; 87 OINTMENT TOPICAL at 09:40

## 2024-03-27 ASSESSMENT — PAIN DESCRIPTION - PAIN TYPE
TYPE: OTHER (COMMENT)
TYPE: OTHER (COMMENT)

## 2024-03-27 ASSESSMENT — PAIN SCALES - WONG BAKER
WONGBAKER_NUMERICALRESPONSE: HURTS A LITTLE BIT
WONGBAKER_NUMERICALRESPONSE: NO HURT
WONGBAKER_NUMERICALRESPONSE: HURTS LITTLE MORE

## 2024-03-27 ASSESSMENT — PAIN - FUNCTIONAL ASSESSMENT
PAIN_FUNCTIONAL_ASSESSMENT: ACTIVITIES ARE NOT PREVENTED
PAIN_FUNCTIONAL_ASSESSMENT: PREVENTS OR INTERFERES SOME ACTIVE ACTIVITIES AND ADLS
PAIN_FUNCTIONAL_ASSESSMENT: PREVENTS OR INTERFERES SOME ACTIVE ACTIVITIES AND ADLS

## 2024-03-27 ASSESSMENT — PAIN DESCRIPTION - ORIENTATION
ORIENTATION: LEFT
ORIENTATION: RIGHT

## 2024-03-27 ASSESSMENT — PAIN DESCRIPTION - FREQUENCY
FREQUENCY: OTHER (COMMENT)
FREQUENCY: OTHER (COMMENT)

## 2024-03-27 ASSESSMENT — PAIN SCALES - GENERAL
PAINLEVEL_OUTOF10: 0
PAINLEVEL_OUTOF10: 4
PAINLEVEL_OUTOF10: 2
PAINLEVEL_OUTOF10: 10
PAINLEVEL_OUTOF10: 0

## 2024-03-27 ASSESSMENT — PAIN DESCRIPTION - DESCRIPTORS
DESCRIPTORS: ACHING
DESCRIPTORS: PATIENT UNABLE TO DESCRIBE
DESCRIPTORS: PATIENT UNABLE TO DESCRIBE

## 2024-03-27 ASSESSMENT — PAIN DESCRIPTION - LOCATION
LOCATION: GENERALIZED
LOCATION: ARM
LOCATION: KNEE

## 2024-03-27 ASSESSMENT — PAIN DESCRIPTION - ONSET
ONSET: UNABLE TO COMMUNICATE
ONSET: UNABLE TO COMMUNICATE

## 2024-03-27 NOTE — PROGRESS NOTES
Occupational Therapy  Facility/Department: [facility]  Rehabilitation Occupational Therapy Daily Treatment Note    Date: 3/27/24  Patient Name: Zana Voss       Room: 0151/0151-01  MRN: [id number]  Account: [id number]   : 1931  (89 y.o.) Gender: male                    Past Medical History:  has a past medical history of COVID, GERD with esophagitis, Hearing loss, Kaw (hard of hearing), Hyperlipidemia, Hypertension, Melanoma (HCC), TIA (transient ischemic attack), and Wears dentures.  Past Surgical History:   has a past surgical history that includes eye surgery; hip surgery (Right); Femur Surgery (Left); other surgical history (Right, 2017); Upper gastrointestinal endoscopy (N/A, 2022); Colonoscopy (N/A, 2022); and Colonoscopy (N/A, 2022).    Restrictions  Restrictions/Precautions: Fall Risk, Up as Tolerated, General Precautions  Other position/activity restrictions: up with assistance, R weakness, expressive/receptive aphasia  Required Braces or Orthoses?: No    Subjective  Subjective: Pt in bed, appears uncomfortable on arrival, agreeable to getting up and therapy session, unable to answer questions regarding pain due to aphasia, /72, HR 70, O2 sats 95% on RA.  Restrictions/Precautions: Fall Risk;Up as Tolerated;General Precautions             Objective     Cognition  Overall Cognitive Status: Exceptions  Arousal/Alertness: Delayed responses to stimuli  Following Commands: Inconsistently follows commands  Attention Span: Attends with cues to redirect;Difficulty attending to directions;Difficulty dividing attention  Memory: Decreased recall of biographical Information;Decreased recall of precautions;Decreased recall of recent events;Decreased short term memory  Safety Judgement: Decreased awareness of need for assistance;Decreased awareness of need for safety  Problem Solving: Assistance required to generate solutions;Assistance required to identify errors made;Assistance  required to correct errors made;Assistance required to implement solutions;Decreased awareness of errors  Insights: Decreased awareness of deficits  Initiation: Requires cues for all  Sequencing: Requires cues for all  Cognition Comment: limited by aphasia  Orientation  Overall Orientation Status: Impaired  Orientation Level: Unable to assess (unable to answer orientation questions, limited by aphasia)         ADL  Feeding  Assistance Level: Verbal cues;Set-up;Minimal assistance  Skilled Clinical Factors: assist to stab food, pt able to feed self but wanting to spit strawberry back out and assist to get food fully out of mouth  Upper Extremity Dressing  Assistance Level: Dependent  Skilled Clinical Factors: assist to thread BUEs into shirt, pull over head, and pull down in front/back  Lower Extremity Dressing  Assistance Level: Dependent  Skilled Clinical Factors: to change brief/pants, total assist for task  Putting On/Taking Off Footwear  Assistance Level: Dependent  Skilled Clinical Factors: to don socks  Toileting  Assistance Level: Dependent  Skilled Clinical Factors: incontinent of bowel, total assist for hygiene  Toilet Transfers  Equipment: Standard toilet;Grab bars  Additional Factors: Verbal cues;Set-up;Cues for hand placement  Assistance Level: Dependent  Skilled Clinical Factors: mod A in Debbie Stedy for sit<>stands and min A for balance in stance at Debbie Stedy          Functional Mobility  Device: Wheelchair  Activity: To/From bathroom;To/From therapy gym  Assistance Level: Dependent  Skilled Clinical Factors: use of Debbie Stedy for all transfers with min/mod A; max A of 2 to walk with RW and HW for 10 feet and 3-4 feet x2  Bed Mobility  Overall Assistance Level: Dependent  Additional Factors: Set-up;Verbal cues;Increased time to complete;Head of bed raised;With handrails  Sit to Supine  Assistance Level: Dependent  Supine to Sit  Assistance Level: Dependent  Skilled Clinical Factors: mod A of  To: Patient  Education Provided: Role of Therapy;Plan of Care;Precautions;Safety;Transfer Training;Equipment;ADL Function;Mobility Training  Education Provided Comments: role of OT, safety with transfers, midline posture, reaching out of MICHAEL, standing posture, weight shifting  Education Method: Demonstration;Verbal  Barriers to Learning: Cognition;Hearing  Education Outcome: Unable to verbalize;Unable to demonstrate understanding;Continued education needed    Plan  Occupational Therapy Plan  Times Per Week: 5-7x/wk  Current Treatment Recommendations: Strengthening;ROM;Balance training;Functional mobility training;Endurance training;Wheelchair mobility training;Neuromuscular re-education;Cognitive reorientation;Safety education & training;Patient/Caregiver education & training;Equipment evaluation, education, & procurement;Modalities;Positioning;Self-Care / ADL;Home management training;Cognitive/Perceptual training;Coordination training;Co-Treatment;Sensory integration    Goals  Short Term Goals  Time Frame for Short Term Goals: 10 days (4/2/24)  Short Term Goal 1: Pt will perform toilet transfer with modA and LRAD  Short Term Goal 2: Pt will perform TB dressing using domitila-dressing techniques with modA and AE PRN  Short Term Goal 3: Pt will perform TB bathing using domitila-techniques with modA and AE PRN  Short Term Goal 4: Pt will tolerate standing for ~3 min with modAx2 for balance and using LRAD to increase endurance for standing ADLs and functional mobility  Short Term Goal 5: Pt will tolerate x10 reps of AAROM on RUE to increase strength for ADL tasks and funcitonal transfers  Long Term Goals  Time Frame for Long Term Goals : 21 days (4/13/24)  Long Term Goal 1: Pt will perform toilet transfer with CGA and LRAD  Long Term Goal 2: Pt will perform TB dressing using domitila-dressing techniques with SBA and AE PRN  Long Term Goal 3: Pt will perform TB bathing using domitila-techniques with SBA and AE PRN  Long Term Goal 4: Pt

## 2024-03-27 NOTE — PROGRESS NOTES
Physical Therapy  Facility/Department: [facility]  Rehabilitation Physical Therapy Treatment Note    NAME: Zana Voss  : 1931 (89 y.o.)  MRN: [id number]  CODE STATUS: Limited    Date of Service: 3/27/24       Restrictions:  Restrictions/Precautions: Fall Risk, Up as Tolerated, General Precautions  Position Activity Restriction  Other position/activity restrictions: up with assistance, R weakness, expressive/receptive aphasia     SUBJECTIVE  Subjective  Subjective: pt found in bed  Pain: indicates R sided pain but unable to rate          OBJECTIVE  Cognition  Overall Cognitive Status: Exceptions  Arousal/Alertness: Delayed responses to stimuli  Following Commands: Inconsistently follows commands  Attention Span: Attends with cues to redirect;Difficulty attending to directions;Difficulty dividing attention  Memory: Decreased recall of biographical Information;Decreased recall of precautions;Decreased recall of recent events;Decreased short term memory  Safety Judgement: Decreased awareness of need for assistance;Decreased awareness of need for safety  Problem Solving: Assistance required to generate solutions;Assistance required to identify errors made;Assistance required to correct errors made;Assistance required to implement solutions;Decreased awareness of errors  Insights: Decreased awareness of deficits  Initiation: Requires cues for all  Sequencing: Requires cues for all  Cognition Comment: limited by aphasia  Orientation  Overall Orientation Status: Impaired  Orientation Level: Unable to assess    Functional Mobility  Roll Left  Assistance Level: Moderate assistance  Roll Right  Assistance Level: Minimal assistance  Supine to Sit  Assistance Level: Moderate assistance;Requires x 2 assistance  Scooting  Assistance Level: Maximum assistance  Balance  Sitting Balance: Maximum assistance  Standing Balance: Dependent/Total  Standing Balance  Activity: sitting EOb with max A initially progressing to min  of 2 for bed mobility, TD for functional trnsfers with STEDY or sit pivot, TD for gait up to 10 ft with RW. pt incontinent of bowel at start of session. due to burden of care, contiue to rec home with 24/7 and HHPT vs SNF pending family availablity to assist. DME: TBD pending progress. pt did have coughing episode when drinking ensure, as well as diffiuclty chewing foods (appears to like pudding better) SLP and MD notified  Activity Tolerance: Patient tolerated treatment well  Discharge Recommendations: 24 hour supervision or assist;Home with Home health PT  PT Equipment Recommendations  Equipment Needed: Yes  Other: defer    Goals  Patient Goals   Patient Goals : did not verbalize  Short Term Goals  Time Frame for Short Term Goals: 4/1  Short Term Goal 1: pt will complete STS to platform walker with MAX A x2  Short Term Goal 2: pt will perform supine to sit with MOD A  Short Term Goal 3: pt will perform bed to chair with stedy and MOD A  Short Term Goal 4: pt will tolerate gait assessment if/when appropriate for LTG to be set  Short Term Goal 5: pt will propel WC 50 ft with domitila technique and MOD A  Long Term Goals  Time Frame for Long Term Goals : 4/13  Long Term Goal 1: pt will perform STS to platform walker with MOD A  Long Term Goal 2: pt will transfer bed to chair with platform walker and MAX A  Long Term Goal 3: pt will perform supine <> sit with MIN A  Long Term Goal 4: pt will propel WC 50 ft with CGA  Long Term Goal 5: gait goal to be set    PLAN OF CARE/SAFETY  Physical Therapy Plan  General Plan: 5-7 times per week  Current Treatment Recommendations: Strengthening;ROM;Balance training;Endurance training;Functional mobility training;Gait training;Patient/Caregiver education & training;Neuromuscular re-education;Equipment evaluation, education, & procurement;Therapeutic activities;Co-Treatment;Home exercise program;Safety education & training  Safety Devices  Type of Devices: All fall risk precautions in

## 2024-03-27 NOTE — CARE COORDINATION
Weekly team care conference with interdisciplinary team on: 03/27/24    Chart reviewed for length of stay. IP day # 3  Unit:  ARU  Diagnosis and current status as per MD progress: Late effects of cerebral ischemic stroke   Consultants Following: Palliative care  Planned Discharge Date: 04/16/24  Durable medical equipment needed: defer to SNF  Discharge Plan: Subacute/Skilled Nursing Facility     89 yr old male. Dx:Late effects of cerebral ischemic stroke. Independent PLOF w/o AD. Lives with grandson in a one level home. Patient owns shower chair. Therapy recommendations are Subacute/Skilled Nursing Facility. DME will be deferred to SNF.  CM spoke to Lianna (daughter) via telephone. Cm update her on team conference DCP, including discharge date of 04/16/24, therapy recommendations as above. Lianna gave writer Anahi Nazario (daughter to pt) lives in [place], number to add in emergency contact.     Rossana Su RN

## 2024-03-27 NOTE — PROGRESS NOTES
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]    Precautions: falls and aspirations  Home situation: Home with grandson; retired; primary for medications, finances, and homemaking tasks PTA; active  PTA  ST Dx: [x] Aphasia  [x] Dysarthria  [] Apraxia   [x] Oropharyngeal dysphagia [] Cognitive Impairment  [] Other:   Date of Admit: 3/24/2024  Room #: 0151/0151-01    Current functional status (updated daily):         Pt being seen for : [x] Speech/Language Treatment  [x] Dysphagia Treatment [] Cognitive Treatment  [] Other:  Communication: []WFL  [x] Aphasia  [x] Dysarthria  [] Apraxia  [] Pragmatic Impairment [] Non-verbal  [] Hearing Loss  [] Other:   Cognition: [] WFL  [] Mild  [] Moderate  [] Severe [x] Unable to Assess d/t severity of aphasia  [] Other:  Memory: [] WFL  [] Mild  [] Moderate  [] Severe [x] Unable to Assess d/t severity of aphasia  [] Other:  Behavior: [] Alert  [] Cooperative  []  Pleasant  [] Confused  [x] Agitated  [x] Uncooperative  [] Distractible [] Motivated  [] Self-Limiting [] Anxious  [] Other:  Endurance:  [] Adequate for participation in SLP sessions  [x] Reduced overall  [x] Lethargic  [] Other:  Safety: [] No concerns at this time  [x] Reduced insight into deficits  [x]  Reduced safety awareness [] Not following call light procedures  [] Unable to Assess  [] Other:    Current Diet Order:ADULT DIET; Regular  ADULT ORAL NUTRITION SUPPLEMENT; Lunch; Standard High Calorie/High Protein Oral Supplement  Swallowing Precautions: upright for all intake, stay upright for at least 30 mins after intake, small bites/sips, close supervision, oral care 2-3x/day to reduce adverse affects in the event of aspiration, increase physical mobility as able, STRICT aspiration precautions, and hold PO and contact    Chloé Russ M.A. CCC-SLP   Speech-Language Pathologist

## 2024-03-27 NOTE — PROGRESS NOTES
Zana Voss  3/27/2024  [id number]    Chief Complaint: Late effects of cerebral ischemic stroke    Subjective:   No acute events overnight. Today Zana is seen in his room with nursing present. History is limited from patient. He appears comfortable and does not demonstrate any pain behaviors.     ROS: unable to reliably obtain    Objective:  Patient Vitals for the past 24 hrs:   BP Temp Temp src Pulse Resp SpO2   03/27/24 0942 (!) 168/75 97.2 °F (36.2 °C) Oral 68 16 96 %   03/26/24 1930 (!) 154/74 97.7 °F (36.5 °C) Oral 70 18 96 %   03/26/24 1500 (!) 147/68 -- -- -- -- --   03/26/24 1346 (!) 163/72 -- -- 69 -- 95 %     Gen: No distress, seated up in bed  HEENT: Normocephalic, atraumatic.   CV: RRR  Resp: No respiratory distress. Lungs CTAB  Abd: Soft, nondistended  Ext: No edema.   Neuro: Alert, aphasic    Wt Readings from Last 3 Encounters:   03/24/24 87 kg (191 lb 12.8 oz)   03/21/24 95.3 kg (210 lb)   08/03/23 95.3 kg (210 lb)       Laboratory data:   Lab Results   Component Value Date    WBC 8.6 03/25/2024    HGB 13.7 03/25/2024    HCT 41.2 03/25/2024    MCV 86.8 03/25/2024     03/25/2024       Lab Results   Component Value Date/Time     03/25/2024 06:31 AM    K 3.4 03/25/2024 06:31 AM     03/25/2024 06:31 AM    CO2 23 03/25/2024 06:31 AM    BUN 24 03/25/2024 06:31 AM    CREATININE 1.0 03/25/2024 06:31 AM    GLUCOSE 78 03/25/2024 06:31 AM    CALCIUM 8.3 03/25/2024 06:31 AM        Therapy progress:  Physical therapy:  Bed Mobility:     Sit>supine:     Supine>sit:  Assistance Level: Moderate assistance, Requires x 2 assistance  Skilled Clinical Factors: with HOB elevated, mod A of 2  Transfers:     Sit>stand:  Assistance Level: Moderate assistance, Requires x 2 assistance  Skilled Clinical Factors: sit to stand from EOB with LUE support on bed rail with mod A of 2. sit to stand EOB to STEDY with min A of 1. sit to stadn w/c to // bars with mod-max A of 1 + CGA-min A of  replace PRN     HTN  - switched amlodipine to nifedipine for 3/27     BPH  - flomax     Bowels: adjust medications as needed for regular bowel movements     Bladder: Check PVR x 3.  IMC if PVR > 200ml or if any volume is > 500 ml.      Sleep: melatonin provided prn.      PPX  DVT: lovenox  GI: pantoprazole     Follow up appointments: PCP    Therapy progress: demonstrated improved posture and strength this date for transfers with use of Debbie Stedy and min A of 1   Services: TBD   EDOD: 4/13    Interdisciplinary team conference was held today with entire rehab treatment team including PT, OT, SLP (if applicable), Dietician, RN, and SW. Discussion focused on progress toward rehab goals and discharge planning. Making progress. Barriers include level of assistance, endurance, comorbidities. We as a medical team, and I as the physician , made a plan to work on these barriers to facilitate safe discharge. Plan will be presented to patient/family (if available).     Anai Gutierrez MD 3/27/2024, 1:07 PM

## 2024-03-27 NOTE — PROGRESS NOTES
Attempted  visit for spiritual/emotional support.  Family not visiting at time, patient resting peacefully.    For  support at any point during admission or team discussions, please call Spiritual Health for presence.    Thank you for consulting Spiritual Health    If you would like a 's presence for emotional, spiritual, grief or comfort care,   please dial \"[phone]\" and ask for the  on-call to be paged.    For help with Advanced Care Planning, Power of  for Healthcare or Living Will forms, you may also call us directly:    [phone]) Marcos  [phone]) Jhoana  [phone]) Outpatient    - Rex Moreno    [facility]

## 2024-03-28 LAB
ANION GAP SERPL CALCULATED.3IONS-SCNC: 9 MMOL/L (ref 3–16)
BASOPHILS # BLD: 0 K/UL (ref 0–0.2)
BASOPHILS NFR BLD: 0.6 %
BUN SERPL-MCNC: 31 MG/DL (ref 7–20)
CALCIUM SERPL-MCNC: 8.5 MG/DL (ref 8.3–10.6)
CHLORIDE SERPL-SCNC: 107 MMOL/L (ref 99–110)
CO2 SERPL-SCNC: 25 MMOL/L (ref 21–32)
CREAT SERPL-MCNC: 1.1 MG/DL (ref 0.8–1.3)
DEPRECATED RDW RBC AUTO: 14.9 % (ref 12.4–15.4)
EOSINOPHIL # BLD: 0.1 K/UL (ref 0–0.6)
EOSINOPHIL NFR BLD: 1.6 %
GFR SERPLBLD CREATININE-BSD FMLA CKD-EPI: 63 ML/MIN/{1.73_M2}
GLUCOSE SERPL-MCNC: 105 MG/DL (ref 70–99)
HCT VFR BLD AUTO: 41.1 % (ref 40.5–52.5)
HGB BLD-MCNC: 13.7 G/DL (ref 13.5–17.5)
LYMPHOCYTES # BLD: 1.5 K/UL (ref 1–5.1)
LYMPHOCYTES NFR BLD: 17.9 %
MAGNESIUM SERPL-MCNC: 2.2 MG/DL (ref 1.8–2.4)
MCH RBC QN AUTO: 29.1 PG (ref 26–34)
MCHC RBC AUTO-ENTMCNC: 33.4 G/DL (ref 31–36)
MCV RBC AUTO: 87 FL (ref 80–100)
MONOCYTES # BLD: 0.7 K/UL (ref 0–1.3)
MONOCYTES NFR BLD: 8.7 %
NEUTROPHILS # BLD: 6 K/UL (ref 1.7–7.7)
NEUTROPHILS NFR BLD: 71.2 %
PLATELET # BLD AUTO: 233 K/UL (ref 135–450)
PMV BLD AUTO: 7.6 FL (ref 5–10.5)
POTASSIUM SERPL-SCNC: 3.5 MMOL/L (ref 3.5–5.1)
RBC # BLD AUTO: 4.73 M/UL (ref 4.2–5.9)
SODIUM SERPL-SCNC: 141 MMOL/L (ref 136–145)
WBC # BLD AUTO: 8.4 K/UL (ref 4–11)

## 2024-03-28 PROCEDURE — 97530 THERAPEUTIC ACTIVITIES: CPT

## 2024-03-28 PROCEDURE — 6360000002 HC RX W HCPCS: Performed by: STUDENT IN AN ORGANIZED HEALTH CARE EDUCATION/TRAINING PROGRAM

## 2024-03-28 PROCEDURE — 80048 BASIC METABOLIC PNL TOTAL CA: CPT

## 2024-03-28 PROCEDURE — 92526 ORAL FUNCTION THERAPY: CPT

## 2024-03-28 PROCEDURE — 97112 NEUROMUSCULAR REEDUCATION: CPT

## 2024-03-28 PROCEDURE — 97116 GAIT TRAINING THERAPY: CPT

## 2024-03-28 PROCEDURE — 83735 ASSAY OF MAGNESIUM: CPT

## 2024-03-28 PROCEDURE — 97535 SELF CARE MNGMENT TRAINING: CPT

## 2024-03-28 PROCEDURE — 6370000000 HC RX 637 (ALT 250 FOR IP): Performed by: STUDENT IN AN ORGANIZED HEALTH CARE EDUCATION/TRAINING PROGRAM

## 2024-03-28 PROCEDURE — 1280000000 HC REHAB R&B

## 2024-03-28 PROCEDURE — 97542 WHEELCHAIR MNGMENT TRAINING: CPT

## 2024-03-28 PROCEDURE — 92507 TX SP LANG VOICE COMM INDIV: CPT

## 2024-03-28 PROCEDURE — 36415 COLL VENOUS BLD VENIPUNCTURE: CPT

## 2024-03-28 PROCEDURE — 85025 COMPLETE CBC W/AUTO DIFF WBC: CPT

## 2024-03-28 RX ADMIN — NYSTATIN 500000 UNITS: 100000 SUSPENSION ORAL at 20:27

## 2024-03-28 RX ADMIN — ACETAMINOPHEN 650 MG: 325 TABLET ORAL at 22:44

## 2024-03-28 RX ADMIN — ACETAMINOPHEN 650 MG: 325 TABLET ORAL at 15:04

## 2024-03-28 RX ADMIN — LATANOPROST 1 DROP: 50 SOLUTION OPHTHALMIC at 22:44

## 2024-03-28 RX ADMIN — CASTOR OIL AND BALSAM, PERU: 788; 87 OINTMENT TOPICAL at 07:58

## 2024-03-28 RX ADMIN — ENOXAPARIN SODIUM 40 MG: 100 INJECTION SUBCUTANEOUS at 07:57

## 2024-03-28 RX ADMIN — NYSTATIN 500000 UNITS: 100000 SUSPENSION ORAL at 12:06

## 2024-03-28 RX ADMIN — ASPIRIN 81 MG 81 MG: 81 TABLET ORAL at 07:57

## 2024-03-28 RX ADMIN — Medication 5 MG: at 22:44

## 2024-03-28 RX ADMIN — PANTOPRAZOLE SODIUM 40 MG: 40 TABLET, DELAYED RELEASE ORAL at 07:58

## 2024-03-28 RX ADMIN — CLOPIDOGREL BISULFATE 75 MG: 75 TABLET ORAL at 07:57

## 2024-03-28 RX ADMIN — DORZOLAMIDE HYDROCHLORIDE AND TIMOLOL MALEATE 1 DROP: 20; 5 SOLUTION/ DROPS OPHTHALMIC at 22:44

## 2024-03-28 RX ADMIN — ATORVASTATIN CALCIUM 80 MG: 80 TABLET, FILM COATED ORAL at 20:28

## 2024-03-28 RX ADMIN — NYSTATIN 500000 UNITS: 100000 SUSPENSION ORAL at 16:49

## 2024-03-28 RX ADMIN — TAMSULOSIN HYDROCHLORIDE 0.4 MG: 0.4 CAPSULE ORAL at 07:57

## 2024-03-28 RX ADMIN — NIFEDIPINE 60 MG: 30 TABLET, FILM COATED, EXTENDED RELEASE ORAL at 07:57

## 2024-03-28 RX ADMIN — CASTOR OIL AND BALSAM, PERU: 788; 87 OINTMENT TOPICAL at 20:28

## 2024-03-28 ASSESSMENT — PAIN - FUNCTIONAL ASSESSMENT: PAIN_FUNCTIONAL_ASSESSMENT: ACTIVITIES ARE NOT PREVENTED

## 2024-03-28 ASSESSMENT — PAIN SCALES - WONG BAKER: WONGBAKER_NUMERICALRESPONSE: NO HURT

## 2024-03-28 NOTE — PROGRESS NOTES
[facility]: ACUTE REHAB UNIT  SPEECH-LANGUAGE PATHOLOGY      [x] Daily  [] Weekly Care Conference Note  [] Discharge    Patient:Zana Voss      :1931  MRN:[id number]  Rehab Dx/Hx: Late effects of cerebral ischemic stroke [I69.30]    Precautions: falls and aspirations  Home situation: Home with grandson; retired; primary for medications, finances, and homemaking tasks PTA; active  PTA  ST Dx: [x] Aphasia  [x] Dysarthria  [] Apraxia   [x] Oropharyngeal dysphagia [] Cognitive Impairment  [] Other:   Date of Admit: 3/24/2024  Room #: 0151/0151-01    Current functional status (updated daily):         Pt being seen for : [x] Speech/Language Treatment  [x] Dysphagia Treatment [] Cognitive Treatment  [] Other:  Communication: []WFL  [x] Aphasia  [x] Dysarthria  [] Apraxia  [] Pragmatic Impairment [] Non-verbal  [] Hearing Loss  [] Other:   Cognition: [] WFL  [] Mild  [] Moderate  [] Severe [x] Unable to Assess d/t severity of aphasia  [] Other:  Memory: [] WFL  [] Mild  [] Moderate  [] Severe [x] Unable to Assess d/t severity of aphasia  [] Other:  Behavior: [x] Alert  [] Cooperative  []  Pleasant  [] Confused  [x] Agitated  [x] Uncooperative  [x] Distractible [] Motivated  [] Self-Limiting [] Anxious  [] Other:  Endurance:  [] Adequate for participation in SLP sessions  [x] Reduced overall  [] Lethargic  [] Other:  Safety: [] No concerns at this time  [] Reduced insight into deficits  []  Reduced safety awareness [] Not following call light procedures  [x] Unable to Assess  [] Other:    Current Diet Order:ADULT DIET; Regular  ADULT ORAL NUTRITION SUPPLEMENT; Lunch; Standard High Calorie/High Protein Oral Supplement  Swallowing Precautions: upright for all intake, stay upright for at least 30 mins after intake, small bites/sips, close supervision, oral care 2-3x/day to reduce adverse affects in the event of aspiration, increase physical mobility as able, STRICT aspiration precautions, and hold PO and contact  there was some previous concern re: a possible sore in pt's oral cavity preventing pt from wanting to place dentures. SLP observed oral cavity and gums, did not appear to have a sore. MD also looked in pt's oral cavity during session.   Goal not directly targeted.   Goal 2: The patient/caregiver will demonstrate understanding of compensatory swallow strategies, for improved swallow safety   SLP edu pt re: importance of PO Intake, small bites/sips, slow rate, alternate liquids and solids. No evidence of learning. Goal not directly targeted.   Goal 3: The patient will tolerate instrumental assessment when able   Will continue to monitor for need for instrumental during admission.  Will continue to monitor for need for instrumental during admission.    Goal 4: The patient will complete oral motor exercises to improve labial and lingual strength / ROM / coordination 5/5 given mod cues    Goal not directly targeted.  Attempted - pt shaking his head 'no' when SLP attempted to have pt complete. Suspect it will be difficult for pt to due to severity of receptive aphasia and following commands     Speech / Language Goals:  Short-term Goals  Timeframe for Short-term Goals: (18 days; 04/10/24)    Goal 1: Pt will complete automatic speech tasks with 50% acc given mod cues   Name:   -SLP provided visual cue, as well as used YARA, hand tapping, and carrier phrase for \"my name is ___\"   -visual cue did include his name written (Ray - nickname he goes by)  -attempted x5  -pt stated \"my\" x1 during task, but no other attempts at verbalizations  -pt either shaking or nodding head during task     Counting 1-5  -SLP provided verbal and visual cues   -pt grunting or verbalizing \"oh oh oh\" or \"ah ah ah\"   -no appropriate utterances for counting   Name:  -attempted use of YARA, hand tapping, and carrier phrase  -pt with no attempts at vocalizations, just shaking his head 'no'       Goal 2: Pt will complete basic expressive language tasks  (e.g. object naming, phrase completion) with 50% acc given mod cues   Goal not directly targeted. Naming Objects  -SLP showed pt various objects within his room, attempting to have pt name them and / or repeat SLP  -pt vocalizing with grunts or verbalizing \"oh oh oh\" or \"ah ah ah\"     Of note, pt with increased use of gestures to communicate wants/needs. Pt pointed to his legs and used gestures to indicate a blanket on him (confirmed with asking yes/no, although pt is unreliable at times). Pt also gesturing for lights to be turned off, pointing at head to indicate headache (again, confirmed with asking yes/no, although pt is unreliable at times - RN made aware)     Goal 3: Pt will complete basic receptive language tasks (e.g. object ID, yes/no responses, following commands) with 50% acc given mod cues   Simple Yes/No Questions re: Personal Info  -pt answered with 40% acc given max cues  -attempted to use visual aid with yes/no written on it - pt becoming increasingly agitated  Following Commands - Functional Transfers  -pt attempted to get up from bedside and indicated wanting to get back in bed per PCA  -PCA and SLP assisted pt with using steady to return to bed  -pt requiring tactile cues to stand from chair with verbal command  -pt followed 1-step command to grab bar for steady (visual and verbal cues)   -pt required max cues in order to sit down in steady (PT indicated tactile cues to hip at needed, which were eventually successful)   -with repositioning in bed, pt required tactile and visual cues. Questionable if verbal cueing was beneficial due to pt being [place] and not wearing pocket talker at this time     Yes/No questions indirectly targeted - pt continues to be inconsistent but pt is using gestures to indicate yes (nod) and no (shake)     Goal 4: Pt will complete simple repetition tasks with 50% acc given mod cues   Attempted to target with stating name and counting. Pt unable to repeat SLP  CV Words

## 2024-03-28 NOTE — PROGRESS NOTES
Zana Voss  3/28/2024  [id number]    Chief Complaint: Late effects of cerebral ischemic stroke    Subjective:   No acute events overnight. Today Zana is seen with nursing present. History is limited from patient. He appears comfortable and does not demonstrate any pain behaviors.     ROS: unable to reliably obtain    Objective:  Patient Vitals for the past 24 hrs:   BP Temp Temp src Pulse Resp SpO2 Weight   03/28/24 1144 128/71 -- -- 62 -- 95 % --   03/28/24 0728 (!) 140/81 97.9 °F (36.6 °C) Oral 58 16 95 % --   03/27/24 1958 (!) 147/70 98.2 °F (36.8 °C) Oral 69 18 92 % --   03/27/24 1530 (!) 155/72 -- -- 80 -- 91 % --   03/27/24 1501 -- -- -- -- -- -- 82.5 kg (181 lb 14.1 oz)     Gen: No distress, seated up in bed  HEENT: Normocephalic, atraumatic. White plaque on tongue  CV: extremities well perfused  Resp: No respiratory distress.   Abd: Soft, nondistended  Ext: No edema.   Neuro: Alert, aphasic    Wt Readings from Last 3 Encounters:   03/27/24 82.5 kg (181 lb 14.1 oz)   03/21/24 95.3 kg (210 lb)   08/03/23 95.3 kg (210 lb)       Laboratory data:   Lab Results   Component Value Date    WBC 8.4 03/28/2024    HGB 13.7 03/28/2024    HCT 41.1 03/28/2024    MCV 87.0 03/28/2024     03/28/2024       Lab Results   Component Value Date/Time     03/28/2024 06:07 AM    K 3.5 03/28/2024 06:07 AM     03/28/2024 06:07 AM    CO2 25 03/28/2024 06:07 AM    BUN 31 03/28/2024 06:07 AM    CREATININE 1.1 03/28/2024 06:07 AM    GLUCOSE 105 03/28/2024 06:07 AM    CALCIUM 8.5 03/28/2024 06:07 AM        Therapy progress:  Physical therapy:  Bed Mobility:     Sit>supine:     Supine>sit:  Assistance Level: Moderate assistance  Skilled Clinical Factors: with HOB elevated, mod A of 1 with increased time and bed rail  Transfers:     Sit>stand:  Assistance Level: Minimal assistance, Moderate assistance, Requires x 2 assistance  Skilled Clinical Factors: sit to stand EOB to HHA, wc to HW or // bars and recliner to HHA

## 2024-03-28 NOTE — PROGRESS NOTES
Occupational Therapy  Facility/Department: [facility]  Rehabilitation Occupational Therapy Daily Treatment Note    Date: 3/28/24  Patient Name: Zana Voss       Room: 0151/0151-01  MRN: [id number]  Account: [id number]   : 1931  (89 y.o.) Gender: male                    Past Medical History:  has a past medical history of COVID, GERD with esophagitis, Hearing loss, [place] (hard of hearing), Hyperlipidemia, Hypertension, Melanoma (HCC), TIA (transient ischemic attack), and Wears dentures.  Past Surgical History:   has a past surgical history that includes eye surgery; hip surgery (Right); Femur Surgery (Left); other surgical history (Right, 2017); Upper gastrointestinal endoscopy (N/A, 2022); Colonoscopy (N/A, 2022); and Colonoscopy (N/A, 2022).    Restrictions  Restrictions/Precautions: Fall Risk, Up as Tolerated, General Precautions  Other position/activity restrictions: up with assistance, R weakness, expressive/receptive aphasia  Required Braces or Orthoses?: No    Subjective  Subjective: Pt in bed, appears uncomfortable on arrival but more comfortable once assisted to EOB, agreeable to OT/PT session, /71, HR 61, O2 sats 95% on RA.  Restrictions/Precautions: Fall Risk;Up as Tolerated;General Precautions             Objective     Cognition  Overall Cognitive Status: Exceptions  Arousal/Alertness: Delayed responses to stimuli  Following Commands: Inconsistently follows commands  Attention Span: Attends with cues to redirect;Difficulty attending to directions;Difficulty dividing attention  Memory: Decreased recall of biographical Information;Decreased recall of precautions;Decreased recall of recent events;Decreased short term memory  Safety Judgement: Decreased awareness of need for assistance;Decreased awareness of need for safety  Problem Solving: Assistance required to generate solutions;Assistance required to identify errors made;Assistance required to correct errors  pivot  Assistance Level: Dependent  Skilled Clinical Factors: mod A + min/mod A to L   Neuromuscular Education  Neuromuscular education: Yes  Trunk Control: fair posture and good correction with TCs to stand up fully and extend trunk during standing and reaching task and for mobility; good ability to keep posture upright while reaching with LUE and good ability to pull [place] from mirror with [place] for first 2 out of 14.  Functional Movement Patterns: Pt completed walking with HW and max A of 2 and then in parallel bars for max A from PT at RLE and hips, while OT assisted with mod/max A at trunk and RUE. Pt able to release parallel bar and grasp bean bag x3 but unable to toss to target  OT Exercises  Circulation/Endurance Exercises: sit<>stands x5 from recliner with min/mod A for LUE support for pt to pull self up and CGA/Mario for RUE support     Assessment  Assessment  Assessment: Pt agreeable to OT session. Pt demonstrated improved bed mobility with mod A and sitting balance at EOB with min A. Pt continues to require co-treat for PT to assist with sitting balance while OT assisted with dressing tasks and for PT to assist with RLE management and balance at hips while OT assisted with RUE and trunk posture. Pt demonstrated fair standing tolerance to stand for 2-4 minutes x4 and reaching to target. Pt demonstrated improved ability to grasp items but still unable to coordination/understand command to throw. Pt completed sit pivot transfers with improved strength, sequencing, and coordination. Continue POC.  Activity Tolerance: Patient tolerated treatment well  Discharge Recommendations: Subacute/Skilled Nursing Facility  OT Equipment Recommendations  Other: TBD  Safety Devices  Safety Devices in place: Yes  Type of devices: Gait belt;Call light within reach;Nurse notified;Left in chair;Chair alarm in place    Patient Education  Education  Education Given To: Patient  Education Provided: Role of Therapy;Plan of  Care;Precautions;Safety;Transfer Training;Equipment;ADL Function;Mobility Training  Education Provided Comments: role of OT, safety with transfers, midline posture, reaching out of MICHAEL, standing posture, weight shifting  Education Method: Demonstration;Verbal  Barriers to Learning: Cognition;Hearing  Education Outcome: Unable to verbalize;Unable to demonstrate understanding;Continued education needed    Plan  Occupational Therapy Plan  Times Per Week: 5-7x/wk  Current Treatment Recommendations: Strengthening;ROM;Balance training;Functional mobility training;Endurance training;Wheelchair mobility training;Neuromuscular re-education;Cognitive reorientation;Safety education & training;Patient/Caregiver education & training;Equipment evaluation, education, & procurement;Modalities;Positioning;Self-Care / ADL;Home management training;Cognitive/Perceptual training;Coordination training;Co-Treatment;Sensory integration    Goals  Short Term Goals  Time Frame for Short Term Goals: 10 days (4/2/24)  Short Term Goal 1: Pt will perform toilet transfer with modA and LRAD  Short Term Goal 2: Pt will perform TB dressing using domitila-dressing techniques with modA and AE PRN  Short Term Goal 3: Pt will perform TB bathing using domitila-techniques with modA and AE PRN  Short Term Goal 4: Pt will tolerate standing for ~3 min with modAx2 for balance and using LRAD to increase endurance for standing ADLs and functional mobility.  Short Term Goal 5: Pt will tolerate x10 reps of AAROM on RUE to increase strength for ADL tasks and funcitonal transfers  Long Term Goals  Time Frame for Long Term Goals : 21 days (4/13/24)  Long Term Goal 1: Pt will perform toilet transfer with CGA and LRAD  Long Term Goal 2: Pt will perform TB dressing using domitila-dressing techniques with SBA and AE PRN  Long Term Goal 3: Pt will perform TB bathing using domitila-techniques with SBA and AE PRN  Long Term Goal 4: Pt will tolerate standing for ~6 min with CGA for balance

## 2024-03-28 NOTE — PROGRESS NOTES
Comprehensive Nutrition Assessment    Type and Reason for Visit:  Reassess    Nutrition Recommendations/Plan:   Continue regular diet  Encourage po intakes  Ensure ONS BID  Monitor po intakes, nutrition adequacy, weights, pertinent labs, BMs     Malnutrition Assessment:  Malnutrition Status:  At risk for malnutrition (Comment) (03/25/24 2264)      Nutrition Assessment:    Follow up: Pt remains nutritionally compromised AEB pt report of continued poor appetite. Pt currently on a regular diet with Ensure ONS daily. PO intakes 1-25% per EMR. Unsure of pt is drinking ONS. Pt willing to try to drink 2 ONS per day. Encouraged po intakes. Will monitor.    Nutrition Related Findings:    BM x2 on 3/27 Wound Type: None (Abrasion rt knee)       Current Nutrition Intake & Therapies:    Average Meal Intake: 1-25%  Average Supplements Intake: Unable to assess  ADULT DIET; Regular  ADULT ORAL NUTRITION SUPPLEMENT; Lunch; Standard High Calorie/High Protein Oral Supplement    Anthropometric Measures:  Height: 170.2 cm (5' 7\")  Ideal Body Weight (IBW): 148 lbs (67 kg)       Current Body Weight: 82.5 kg (181 lb 14.1 oz),   IBW. Weight Source: Bed Scale  Current BMI (kg/m2): 28.5                          BMI Categories: Overweight (BMI 25.0-29.9)    Estimated Daily Nutrient Needs:  Energy Requirements Based On: Kcal/kg  Weight Used for Energy Requirements: Ideal  Energy (kcal/day): 1377-7408  Weight Used for Protein Requirements: Ideal  Protein (g/day): 67-81  Method Used for Fluid Requirements: 1 ml/kcal  Fluid (ml/day): 2412-8102    Nutrition Diagnosis:   Inadequate oral intake related to inadequate protein-energy intake as evidenced by poor intake prior to admission    Nutrition Interventions:   Food and/or Nutrient Delivery: Continue Current Diet, Modify Oral Nutrition Supplement  Nutrition Education/Counseling: No recommendation at this time, Education declined  Coordination of Nutrition Care: Continue to monitor while inpatient

## 2024-03-28 NOTE — PROGRESS NOTES
Physical Therapy  Facility/Department: [facility]  Rehabilitation Physical Therapy Treatment Note    NAME: Zana Voss  : 1931 (89 y.o.)  MRN: [id number]  CODE STATUS: Limited    Date of Service: 3/28/24       Restrictions:  Restrictions/Precautions: Fall Risk, Up as Tolerated, General Precautions  Position Activity Restriction  Other position/activity restrictions: up with assistance, R weakness, expressive/receptive aphasia     SUBJECTIVE  Subjective  Subjective: pt found in bed  Pain: no pain indicated       OBJECTIVE  Cognition  Overall Cognitive Status: Exceptions  Arousal/Alertness: Delayed responses to stimuli  Following Commands: Inconsistently follows commands  Attention Span: Attends with cues to redirect;Difficulty attending to directions;Difficulty dividing attention  Memory: Decreased recall of biographical Information;Decreased recall of precautions;Decreased recall of recent events;Decreased short term memory  Safety Judgement: Decreased awareness of need for assistance;Decreased awareness of need for safety  Problem Solving: Assistance required to generate solutions;Assistance required to identify errors made;Assistance required to correct errors made;Assistance required to implement solutions;Decreased awareness of errors  Insights: Decreased awareness of deficits  Initiation: Requires cues for all  Sequencing: Requires cues for all  Cognition Comment: limited by aphasia  Orientation  Overall Orientation Status: Impaired  Orientation Level: Unable to assess    Functional Mobility  Supine to Sit  Assistance Level: Moderate assistance  Skilled Clinical Factors: with HOB elevated, mod A of 1 with increased time and bed rail  Scooting  Assistance Level: Moderate assistance  Skilled Clinical Factors: to scoot to EOB  Balance  Sitting Balance: Minimal assistance  Standing Balance: Moderate assistance  Standing Balance  Activity: sitting EOB while OT threaded LEs into pants/don shoes constantine min A

## 2024-03-28 NOTE — PLAN OF CARE
Problem: Discharge Planning  Goal: Discharge to home or other facility with appropriate resources  Outcome: Progressing  Flowsheets (Taken 3/27/2024 2159)  Discharge to home or other facility with appropriate resources: Identify barriers to discharge with patient and caregiver     Problem: Pain  Goal: Verbalizes/displays adequate comfort level or baseline comfort level  Outcome: Progressing  Flowsheets (Taken 3/27/2024 1958)  Verbalizes/displays adequate comfort level or baseline comfort level: Assess pain using appropriate pain scale     Problem: Safety - Adult  Goal: Free from fall injury  Outcome: Progressing  Flowsheets (Taken 3/27/2024 2215)  Free From Fall Injury: (no caregiver present) --     Problem: ABCDS Injury Assessment  Goal: Absence of physical injury  Outcome: Progressing  Flowsheets (Taken 3/27/2024 2215)  Absence of Physical Injury: Implement safety measures based on patient assessment     Problem: Skin/Tissue Integrity  Goal: Absence of new skin breakdown  Description: 1.  Monitor for areas of redness and/or skin breakdown  2.  Assess vascular access sites hourly  3.  Every 4-6 hours minimum:  Change oxygen saturation probe site  4.  Every 4-6 hours:  If on nasal continuous positive airway pressure, respiratory therapy assess nares and determine need for appliance change or resting period.  3/27/2024 2215 by Sahara Morataya, RN  Outcome: Progressing  Note: Q2T, applied zinc to shelbie area  3/27/2024 1101 by Mari Brown, RN  Outcome: Progressing     Problem: Nutrition Deficit:  Goal: Optimize nutritional status  Outcome: Progressing  Flowsheets (Taken 3/27/2024 2215)  Nutrient intake appropriate for improving, restoring, or maintaining nutritional needs: Monitor oral intake, labs, and treatment plans     Problem: Neurosensory - Adult  Goal: Achieves stable or improved neurological status  Outcome: Progressing  Flowsheets (Taken 3/27/2024 2159)  Achieves stable or improved neurological status: Assess  medications, impaired vision or hearing, underlying metabolic abnormalities, dehydration, psychiatric diagnoses, notify [facility]

## 2024-03-28 NOTE — PLAN OF CARE
Problem: Discharge Planning  Goal: Discharge to home or other facility with appropriate resources  3/28/2024 0843 by Ivory Green RN  Outcome: Progressing  3/27/2024 2215 by Sahara Morataya RN  Outcome: Progressing  Flowsheets (Taken 3/27/2024 2159)  Discharge to home or other facility with appropriate resources: Identify barriers to discharge with patient and caregiver     Problem: Pain  Goal: Verbalizes/displays adequate comfort level or baseline comfort level  3/28/2024 0843 by Ivory Green RN  Outcome: Progressing  3/27/2024 2215 by Sahara Morataya RN  Outcome: Progressing  Flowsheets (Taken 3/27/2024 1958)  Verbalizes/displays adequate comfort level or baseline comfort level: Assess pain using appropriate pain scale     Problem: Safety - Adult  Goal: Free from fall injury  3/28/2024 0843 by Ivory Green RN  Outcome: Progressing  3/27/2024 2215 by Sahara Morataya RN  Outcome: Progressing  Flowsheets (Taken 3/27/2024 2215)  Free From Fall Injury: (no caregiver present) --     Problem: ABCDS Injury Assessment  Goal: Absence of physical injury  3/28/2024 0843 by Ivory Green RN  Outcome: Progressing  3/27/2024 2215 by Sahara Morataya RN  Outcome: Progressing  Flowsheets (Taken 3/27/2024 2215)  Absence of Physical Injury: Implement safety measures based on patient assessment     Problem: Skin/Tissue Integrity  Goal: Absence of new skin breakdown  Description: 1.  Monitor for areas of redness and/or skin breakdown  2.  Assess vascular access sites hourly  3.  Every 4-6 hours minimum:  Change oxygen saturation probe site  4.  Every 4-6 hours:  If on nasal continuous positive airway pressure, respiratory therapy assess nares and determine need for appliance change or resting period.  3/28/2024 0843 by Ivory Green RN  Outcome: Progressing  3/27/2024 2215 by Sahara Morataya RN  Outcome: Progressing  Note: Q2T, applied zinc to shelbie area     Problem: Nutrition Deficit:  Goal: Optimize nutritional status  3/28/2024  0843 by Norma, Ivory, RN  Outcome: Progressing  3/27/2024 2215 by Sahara Morataya RN  Outcome: Progressing  Flowsheets (Taken 3/27/2024 2215)  Nutrient intake appropriate for improving, restoring, or maintaining nutritional needs: Monitor oral intake, labs, and treatment plans     Problem: Neurosensory - Adult  Goal: Achieves stable or improved neurological status  3/28/2024 0843 by Ivory Green RN  Outcome: Progressing  3/27/2024 2215 by Sahara Morataya RN  Outcome: Progressing  Flowsheets (Taken 3/27/2024 2159)  Achieves stable or improved neurological status: Assess for and report changes in neurological status     Problem: Respiratory - Adult  Goal: Achieves optimal ventilation and oxygenation  3/28/2024 0843 by Ivory Green RN  Outcome: Progressing  3/27/2024 2215 by Sahara Morataya RN  Outcome: Progressing  Flowsheets (Taken 3/27/2024 2159)  Achieves optimal ventilation and oxygenation: Assess for changes in respiratory status     Problem: Cardiovascular - Adult  Goal: Maintains optimal cardiac output and hemodynamic stability  3/28/2024 0843 by Ivory Green RN  Outcome: Progressing  3/27/2024 2215 by Sahara Morataya RN  Outcome: Progressing  Flowsheets (Taken 3/27/2024 2159)  Maintains optimal cardiac output and hemodynamic stability: Monitor blood pressure and heart rate     Problem: Skin/Tissue Integrity - Adult  Goal: Skin integrity remains intact  3/28/2024 0843 by Ivory Green RN  Outcome: Progressing  3/27/2024 2215 by Sahara Morataya RN  Outcome: Progressing  Flowsheets (Taken 3/27/2024 2159)  Skin Integrity Remains Intact: Monitor for areas of redness and/or skin breakdown     Problem: Musculoskeletal - Adult  Goal: Return mobility to safest level of function  3/28/2024 0843 by Ivory Green RN  Outcome: Progressing  3/27/2024 2215 by Sahara Morataya RN  Outcome: Progressing  Flowsheets (Taken 3/27/2024 2159)  Return Mobility to Safest Level of Function: Assess patient stability and activity

## 2024-03-29 LAB
ANION GAP SERPL CALCULATED.3IONS-SCNC: 10 MMOL/L (ref 3–16)
BUN SERPL-MCNC: 34 MG/DL (ref 7–20)
CALCIUM SERPL-MCNC: 8.7 MG/DL (ref 8.3–10.6)
CHLORIDE SERPL-SCNC: 106 MMOL/L (ref 99–110)
CO2 SERPL-SCNC: 26 MMOL/L (ref 21–32)
CREAT SERPL-MCNC: 1 MG/DL (ref 0.8–1.3)
GFR SERPLBLD CREATININE-BSD FMLA CKD-EPI: 70 ML/MIN/{1.73_M2}
GLUCOSE SERPL-MCNC: 109 MG/DL (ref 70–99)
POTASSIUM SERPL-SCNC: 3.7 MMOL/L (ref 3.5–5.1)
SODIUM SERPL-SCNC: 142 MMOL/L (ref 136–145)

## 2024-03-29 PROCEDURE — 6360000002 HC RX W HCPCS: Performed by: STUDENT IN AN ORGANIZED HEALTH CARE EDUCATION/TRAINING PROGRAM

## 2024-03-29 PROCEDURE — 97530 THERAPEUTIC ACTIVITIES: CPT

## 2024-03-29 PROCEDURE — 97535 SELF CARE MNGMENT TRAINING: CPT

## 2024-03-29 PROCEDURE — 97112 NEUROMUSCULAR REEDUCATION: CPT

## 2024-03-29 PROCEDURE — 6370000000 HC RX 637 (ALT 250 FOR IP): Performed by: STUDENT IN AN ORGANIZED HEALTH CARE EDUCATION/TRAINING PROGRAM

## 2024-03-29 PROCEDURE — 92507 TX SP LANG VOICE COMM INDIV: CPT

## 2024-03-29 PROCEDURE — 80048 BASIC METABOLIC PNL TOTAL CA: CPT

## 2024-03-29 PROCEDURE — 1280000000 HC REHAB R&B

## 2024-03-29 PROCEDURE — 92526 ORAL FUNCTION THERAPY: CPT

## 2024-03-29 PROCEDURE — 36415 COLL VENOUS BLD VENIPUNCTURE: CPT

## 2024-03-29 RX ORDER — NIFEDIPINE 30 MG/1
90 TABLET, EXTENDED RELEASE ORAL DAILY
Status: DISCONTINUED | OUTPATIENT
Start: 2024-03-30 | End: 2024-04-05 | Stop reason: HOSPADM

## 2024-03-29 RX ADMIN — PANTOPRAZOLE SODIUM 40 MG: 40 TABLET, DELAYED RELEASE ORAL at 05:21

## 2024-03-29 RX ADMIN — ACETAMINOPHEN 650 MG: 325 TABLET ORAL at 04:49

## 2024-03-29 RX ADMIN — HYDRALAZINE HYDROCHLORIDE 10 MG: 10 TABLET, FILM COATED ORAL at 20:59

## 2024-03-29 RX ADMIN — LATANOPROST 1 DROP: 50 SOLUTION OPHTHALMIC at 21:03

## 2024-03-29 RX ADMIN — NYSTATIN 500000 UNITS: 100000 SUSPENSION ORAL at 20:59

## 2024-03-29 RX ADMIN — CASTOR OIL AND BALSAM, PERU: 788; 87 OINTMENT TOPICAL at 09:21

## 2024-03-29 RX ADMIN — CASTOR OIL AND BALSAM, PERU: 788; 87 OINTMENT TOPICAL at 21:01

## 2024-03-29 RX ADMIN — ATORVASTATIN CALCIUM 80 MG: 80 TABLET, FILM COATED ORAL at 20:59

## 2024-03-29 RX ADMIN — DORZOLAMIDE HYDROCHLORIDE AND TIMOLOL MALEATE 1 DROP: 20; 5 SOLUTION/ DROPS OPHTHALMIC at 21:03

## 2024-03-29 RX ADMIN — ENOXAPARIN SODIUM 40 MG: 100 INJECTION SUBCUTANEOUS at 09:21

## 2024-03-29 RX ADMIN — CLOPIDOGREL BISULFATE 75 MG: 75 TABLET ORAL at 09:21

## 2024-03-29 RX ADMIN — ACETAMINOPHEN 650 MG: 325 TABLET ORAL at 22:58

## 2024-03-29 ASSESSMENT — PAIN SCALES - WONG BAKER: WONGBAKER_NUMERICALRESPONSE: NO HURT

## 2024-03-29 ASSESSMENT — PAIN SCALES - GENERAL: PAINLEVEL_OUTOF10: 0

## 2024-03-29 NOTE — PROGRESS NOTES
Physical Therapy  Facility/Department: [facility]  Rehabilitation Physical Therapy Treatment Note    NAME: Zana Voss  : 1931 (89 y.o.)  MRN: [id number]  CODE STATUS: Limited    Date of Service: 3/29/24       Restrictions:  Restrictions/Precautions: Fall Risk, Up as Tolerated, General Precautions  Position Activity Restriction  Other position/activity restrictions: up with assistance, R weakness, expressive/receptive aphasia     SUBJECTIVE  Subjective  Subjective: pt found in bed  Pain: pain indicated in RUE and RLE however unable to describe or rate 2* aphasia          OBJECTIVE  Cognition  Overall Cognitive Status: Exceptions  Arousal/Alertness: Delayed responses to stimuli  Following Commands: Inconsistently follows commands  Attention Span: Attends with cues to redirect;Difficulty attending to directions;Difficulty dividing attention  Memory: Decreased recall of biographical Information;Decreased recall of precautions;Decreased recall of recent events;Decreased short term memory  Safety Judgement: Decreased awareness of need for assistance;Decreased awareness of need for safety  Problem Solving: Assistance required to generate solutions;Assistance required to identify errors made;Assistance required to correct errors made;Assistance required to implement solutions;Decreased awareness of errors  Insights: Decreased awareness of deficits  Initiation: Requires cues for all  Sequencing: Requires cues for all  Cognition Comment: limited by aphasia  Orientation  Overall Orientation Status: Impaired  Orientation Level: Unable to assess    Functional Mobility  Roll Left  Assistance Level: Minimal assistance;Moderate assistance  Roll Right  Assistance Level: Contact guard assist  Sit to Supine  Assistance Level: Moderate assistance  Supine to Sit  Assistance Level: Moderate assistance  Skilled Clinical Factors: with HOB elevated, mod A of 1 with increased time and bed rail  Scooting  Assistance Level:  place    EDUCATION  Education  Education Given To: Patient  Education Provided: Role of Therapy;Plan of Care;Precautions;Safety;Transfer Training;Cognition;Equipment;Fall Prevention Strategies;Home Exercise Program;Mobility Training  Education Provided Comments: role of PT< safe transfers/gait mechanics  Education Method: Verbal;Demonstration  Barriers to Learning: Cognition;Hearing  Education Outcome: Unable to verbalize;Continued education needed        Therapy Time   Individual Concurrent Group Co-treatment   Time In       0930   Time Out       1030   Minutes       60     Timed Code Treatment Minutes: 60 Minutes       Vilma Kimbrough PT, 03/29/24 at 11:55 AM

## 2024-03-29 NOTE — PROGRESS NOTES
Zana Voss  3/29/2024  [id number]    Chief Complaint: Late effects of cerebral ischemic stroke    Subjective:   No acute events overnight. Today Zana is seen in his room without family present. History is limited from patient. He is awake and alert. He appears comfortable an does not demonstrate any pain behaviors.     ROS: unable to reliably obtain    Objective:  Patient Vitals for the past 24 hrs:   BP Temp Temp src Pulse Resp SpO2   03/29/24 0915 (!) 160/75 98.1 °F (36.7 °C) Oral 60 16 97 %   03/28/24 2026 (!) 147/72 98.1 °F (36.7 °C) Oral 68 16 93 %   03/28/24 1144 128/71 -- -- 62 -- 95 %     Gen: No distress, supine in bed  HEENT: Normocephalic, atraumatic.   CV: extremities well perfused  Resp: No respiratory distress. On room air  Abd: Soft, nondistended  Ext: No edema.   Neuro: Alert, aphasic    Wt Readings from Last 3 Encounters:   03/27/24 82.5 kg (181 lb 14.1 oz)   03/21/24 95.3 kg (210 lb)   08/03/23 95.3 kg (210 lb)       Laboratory data:   Lab Results   Component Value Date    WBC 8.4 03/28/2024    HGB 13.7 03/28/2024    HCT 41.1 03/28/2024    MCV 87.0 03/28/2024     03/28/2024       Lab Results   Component Value Date/Time     03/29/2024 06:32 AM    K 3.7 03/29/2024 06:32 AM     03/29/2024 06:32 AM    CO2 26 03/29/2024 06:32 AM    BUN 34 03/29/2024 06:32 AM    CREATININE 1.0 03/29/2024 06:32 AM    GLUCOSE 109 03/29/2024 06:32 AM    CALCIUM 8.7 03/29/2024 06:32 AM        Therapy progress:  Physical therapy:  Bed Mobility:     Sit>supine:     Supine>sit:  Assistance Level: Moderate assistance  Skilled Clinical Factors: with HOB elevated, mod A of 1 with increased time and bed rail  Transfers:     Sit>stand:  Assistance Level: Minimal assistance, Moderate assistance, Requires x 2 assistance  Skilled Clinical Factors: sit to stand EOB to HHA, wc to HW or // bars and recliner to HHA with min-mod A of 2 pending fatigue. consistent cues for anterior weight shift, pt pulling on AD or

## 2024-03-29 NOTE — CONSULTS
03/29/24 1141   Skin Integumentary    Skin Integumentary (WDL) X   Skin Color Red;Ecchymosis (comment)   Skin Condition/Temp Fragile;Warm   Skin Integrity Tear 3 x 3 x 0.1   Location RUE       Patient has ecchymosis over bi lateral arms.  Change dressing daily. Right posterior elbow fragile open skin tear continuing to bleed.  Cleansed with normal saline, pat dry.  Xeroform placed over open area, several 4 x 4 gauze, wrap with kerlix as compression dressing.  If seeps through replace down to last gauze leave gauze intact replace another several layer gauze and re wrap compression dressing.    Once no bleeding place Xerofrom over wound,cover with  mepilex.  Leave up to 3 days before changing.       Monitor for drainage, order, edema or signs and symptoms of infection.  Call wound care for deterioration [phone] or call [phone] and leave message.

## 2024-03-29 NOTE — PROGRESS NOTES
[facility]: ACUTE REHAB UNIT  SPEECH-LANGUAGE PATHOLOGY      [x] Daily  [] Weekly Care Conference Note  [] Discharge    Patient:Zaan Voss      :1931  MRN:[id number]  Rehab Dx/Hx: Late effects of cerebral ischemic stroke [I69.30]    Precautions: falls and aspirations  Home situation: Home with grandson; retired; primary for medications, finances, and homemaking tasks PTA; active  PTA  ST Dx: [x] Aphasia  [x] Dysarthria  [] Apraxia   [x] Oropharyngeal dysphagia [] Cognitive Impairment  [] Other:   Date of Admit: 3/24/2024  Room #: 0151/0151-01    Current functional status (updated daily):         Pt being seen for : [x] Speech/Language Treatment  [x] Dysphagia Treatment [] Cognitive Treatment  [] Other:  Communication: []WFL  [x] Aphasia  [x] Dysarthria  [] Apraxia  [] Pragmatic Impairment [] Non-verbal  [] Hearing Loss  [] Other:   Cognition: [] WFL  [] Mild  [] Moderate  [] Severe [x] Unable to Assess d/t severity of aphasia  [] Other:  Memory: [] WFL  [] Mild  [] Moderate  [] Severe [x] Unable to Assess d/t severity of aphasia  [] Other:  Behavior: [x] Alert  [] Cooperative  []  Pleasant  [] Confused  [x] Agitated  [x] Uncooperative  [x] Distractible [] Motivated  [] Self-Limiting [] Anxious  [] Other:  Endurance:  [] Adequate for participation in SLP sessions  [x] Reduced overall  [] Lethargic  [] Other:  Safety: [] No concerns at this time  [] Reduced insight into deficits  []  Reduced safety awareness [] Not following call light procedures  [x] Unable to Assess  [] Other:    Current Diet Order:ADULT DIET; Regular  ADULT ORAL NUTRITION SUPPLEMENT; Breakfast, Dinner; Standard High Calorie/High Protein Oral Supplement  Swallowing Precautions: upright for all intake, stay upright for at least 30 mins after intake, small bites/sips, close supervision, oral care 2-3x/day to reduce adverse affects in the event of aspiration, increase physical mobility as able, STRICT aspiration precautions, and hold PO  (e.g. object ID, yes/no responses, following commands) with 50% acc given mod cues   Following Commands: oral care  -pt unable to follow oral motor commands throughout (e.g., stick out your tongue) even with max multimodal cues     Yes/No Questions:  -pt accurately responded to communicate desire to lay back in bed   -pt utilized gestures to supplement communication of need  Yes/No Questions:  -appeared inconsistent when attempting to determine pts needs at times- SLP initially moved pts trash can as pt nodding head \"yes\" when inquired by SLP, but then become agitated and began pointing thumb in backward motion to indicate reclining HOB     Goal 4: Pt will complete simple repetition tasks with 50% acc given mod cues   Attempted to target with stating name. Pt unable to repeat SLP with pt shaking head \"no\" and becoming agitated  Attempted to target with stating name and repeating names of objects with pt nodding or shaking head yes/no and vocalizing \"ah\" or grunting     Other areas targeted: N/A N/A   Education:   SLP attempted to edu pt re: role of SLP, rationale of good oral care, strategies for word finding, strategies for answering yes/no questions. No evidence of learning.  SLP pt edu re: role of SLP, rationale of therapy, importance of participation to promote progress. Understanding was difficult to discern with no evidence of learning    Safety Devices: [x] Call light within reach  [x] Chair alarm activated  [] Bed alarm activated  [] Other: [x] Call light within reach  [] Chair alarm activated  [x] Bed alarm activated  [] Other:    Assessment: Session 1:  Pt lethargic with reduced participation in functional and structured therapeutic tasks. Oral care completed. Attempted pasted toothbrush and swish and spit, but this was difficult for pt to complete due to poor command following. SLP used moistened toothette to clear toothpaste from oral cavity. Continued to suspect thrush on posterior lingual surface - RN  04/13/24    Interventions used this date:  [x] Speech/Language Treatment  [] Instruction in HEP [] Group [] Dysphagia Treatment [] Cognitive Treatment   [] Other:      Total Time Breakdown / Charges    Time in Time out Total Time / units   Cognitive Tx      Speech Tx 1115  1430 1130  1500 15 min / 1 unit   30 min / 1 unit    Dysphagia Tx 1100 1115 15 min / 1 unit        Electronically Signed by     Chloé Russ M.A. CCC-SLP   Speech-Language Pathologist

## 2024-03-29 NOTE — PROGRESS NOTES
Occupational Therapy  Facility/Department: [facility]  Rehabilitation Occupational Therapy Daily Treatment Note    Date: 3/29/24  Patient Name: Zana Voss       Room: 0151/0151-01  MRN: [id number]  Account: [id number]   : 1931  (89 y.o.) Gender: male                    Past Medical History:  has a past medical history of COVID, GERD with esophagitis, Hearing loss, [place] (hard of hearing), Hyperlipidemia, Hypertension, Melanoma (HCC), TIA (transient ischemic attack), and Wears dentures.  Past Surgical History:   has a past surgical history that includes eye surgery; hip surgery (Right); Femur Surgery (Left); other surgical history (Right, 2017); Upper gastrointestinal endoscopy (N/A, 2022); Colonoscopy (N/A, 2022); and Colonoscopy (N/A, 2022).    Restrictions  Restrictions/Precautions: Fall Risk, Up as Tolerated, General Precautions  Other position/activity restrictions: up with assistance, R weakness, expressive/receptive aphasia  Required Braces or Orthoses?: No    Subjective  Subjective: Pt in bed, more uncomfortable, s/s of pain, especially R side, increased tone in R side, agreeable to OT/PT session and shower with encouragement, refusing medication, /66, HR 56, O2 sats 97% on RA.  Restrictions/Precautions: Fall Risk;Up as Tolerated;General Precautions             Objective     Cognition  Overall Cognitive Status: Exceptions  Arousal/Alertness: Delayed responses to stimuli  Following Commands: Inconsistently follows commands  Attention Span: Attends with cues to redirect;Difficulty attending to directions;Difficulty dividing attention  Memory: Decreased recall of biographical Information;Decreased recall of precautions;Decreased recall of recent events;Decreased short term memory  Safety Judgement: Decreased awareness of need for assistance;Decreased awareness of need for safety  Problem Solving: Assistance required to generate solutions;Assistance required to identify  and using LRAD to increase endurance for standing ADLs and funcitonal mobility  Long Term Goal 5: Pt will tolerate x15 reps of RUE AROM to increase strength for ADL tasks and functional transfers    Therapy Time   Individual Concurrent Group Co-treatment   Time In       0930   Time Out       1030   Minutes       60   Timed Code Treatment Minutes: 60 Minutes       Chema Paula, OT

## 2024-03-29 NOTE — PLAN OF CARE
Problem: Discharge Planning  Goal: Discharge to home or other facility with appropriate resources  3/28/2024 2132 by Mere Patel RN  Outcome: Progressing  Flowsheets (Taken 3/28/2024 2132)  Discharge to home or other facility with appropriate resources:   Identify barriers to discharge with patient and caregiver   Arrange for needed discharge resources and transportation as appropriate     Problem: Pain  Goal: Verbalizes/displays adequate comfort level or baseline comfort level  3/28/2024 2132 by Mere Patel RN  Outcome: Progressing  Flowsheets (Taken 3/28/2024 2132)  Verbalizes/displays adequate comfort level or baseline comfort level:   Encourage patient to monitor pain and request assistance   Assess pain using appropriate pain scale     Problem: Safety - Adult  Goal: Free from fall injury  3/28/2024 2132 by Mere Patel RN  Outcome: Progressing  Flowsheets (Taken 3/28/2024 2132)  Free From Fall Injury: Instruct family/caregiver on patient safety     Problem: ABCDS Injury Assessment  Goal: Absence of physical injury  3/28/2024 2132 by Mere Patel RN  Outcome: Progressing  Flowsheets (Taken 3/28/2024 2132)  Absence of Physical Injury: Implement safety measures based on patient assessment     Problem: Skin/Tissue Integrity  Goal: Absence of new skin breakdown  Description: 1.  Monitor for areas of redness and/or skin breakdown  2.  Assess vascular access sites hourly  3.  Every 4-6 hours minimum:  Change oxygen saturation probe site  4.  Every 4-6 hours:  If on nasal continuous positive airway pressure, respiratory therapy assess nares and determine need for appliance change or resting period.  3/28/2024 2132 by Mere Patel RN  Outcome: Progressing     Problem: Nutrition Deficit:  Goal: Optimize nutritional status  3/28/2024 2132 by Mere Patel RN  Outcome: Progressing  Flowsheets (Taken 3/28/2024 1142 by Julisa Gaines, RD, LD)  Nutrient intake appropriate for improving,  restoring, or maintaining nutritional needs:   Assess nutritional status and recommend course of action   Monitor oral intake, labs, and treatment plans   Recommend appropriate diets, oral nutritional supplements, and vitamin/mineral supplements     Problem: Neurosensory - Adult  Goal: Achieves stable or improved neurological status  3/28/2024 2132 by Mere Patel RN  Outcome: Progressing     Problem: Respiratory - Adult  Goal: Achieves optimal ventilation and oxygenation  3/28/2024 2132 by Mere Patel RN  Outcome: Progressing     Problem: Cardiovascular - Adult  Goal: Maintains optimal cardiac output and hemodynamic stability  3/28/2024 2132 by Mere Patel RN  Outcome: Progressing     Problem: Skin/Tissue Integrity - Adult  Goal: Skin integrity remains intact  3/28/2024 2132 by Mere Patel RN  Outcome: Progressing

## 2024-03-30 PROCEDURE — 6370000000 HC RX 637 (ALT 250 FOR IP): Performed by: STUDENT IN AN ORGANIZED HEALTH CARE EDUCATION/TRAINING PROGRAM

## 2024-03-30 PROCEDURE — 1280000000 HC REHAB R&B

## 2024-03-30 PROCEDURE — 6360000002 HC RX W HCPCS: Performed by: STUDENT IN AN ORGANIZED HEALTH CARE EDUCATION/TRAINING PROGRAM

## 2024-03-30 RX ORDER — OXYCODONE HYDROCHLORIDE 5 MG/1
5 TABLET ORAL ONCE
Status: COMPLETED | OUTPATIENT
Start: 2024-03-30 | End: 2024-03-30

## 2024-03-30 RX ORDER — LISINOPRIL 10 MG/1
10 TABLET ORAL DAILY
Status: DISCONTINUED | OUTPATIENT
Start: 2024-03-30 | End: 2024-04-05 | Stop reason: HOSPADM

## 2024-03-30 RX ADMIN — DORZOLAMIDE HYDROCHLORIDE AND TIMOLOL MALEATE 1 DROP: 20; 5 SOLUTION/ DROPS OPHTHALMIC at 20:13

## 2024-03-30 RX ADMIN — NIFEDIPINE 90 MG: 30 TABLET, FILM COATED, EXTENDED RELEASE ORAL at 10:39

## 2024-03-30 RX ADMIN — NYSTATIN 500000 UNITS: 100000 SUSPENSION ORAL at 18:17

## 2024-03-30 RX ADMIN — CLOPIDOGREL BISULFATE 75 MG: 75 TABLET ORAL at 10:40

## 2024-03-30 RX ADMIN — ACETAMINOPHEN 650 MG: 325 TABLET ORAL at 05:06

## 2024-03-30 RX ADMIN — ATORVASTATIN CALCIUM 80 MG: 80 TABLET, FILM COATED ORAL at 20:10

## 2024-03-30 RX ADMIN — TAMSULOSIN HYDROCHLORIDE 0.4 MG: 0.4 CAPSULE ORAL at 10:40

## 2024-03-30 RX ADMIN — CASTOR OIL AND BALSAM, PERU: 788; 87 OINTMENT TOPICAL at 10:40

## 2024-03-30 RX ADMIN — LATANOPROST 1 DROP: 50 SOLUTION OPHTHALMIC at 20:13

## 2024-03-30 RX ADMIN — OXYCODONE 5 MG: 5 TABLET ORAL at 22:58

## 2024-03-30 RX ADMIN — ACETAMINOPHEN 650 MG: 325 TABLET ORAL at 20:10

## 2024-03-30 RX ADMIN — ENOXAPARIN SODIUM 40 MG: 100 INJECTION SUBCUTANEOUS at 10:39

## 2024-03-30 RX ADMIN — Medication 5 MG: at 20:10

## 2024-03-30 RX ADMIN — CASTOR OIL AND BALSAM, PERU: 788; 87 OINTMENT TOPICAL at 20:11

## 2024-03-30 RX ADMIN — ASPIRIN 81 MG 81 MG: 81 TABLET ORAL at 10:40

## 2024-03-30 RX ADMIN — LISINOPRIL 10 MG: 10 TABLET ORAL at 15:24

## 2024-03-30 RX ADMIN — PANTOPRAZOLE SODIUM 40 MG: 40 TABLET, DELAYED RELEASE ORAL at 05:06

## 2024-03-30 ASSESSMENT — PAIN DESCRIPTION - ORIENTATION
ORIENTATION: LEFT
ORIENTATION: LEFT

## 2024-03-30 ASSESSMENT — PAIN SCALES - GENERAL
PAINLEVEL_OUTOF10: 8
PAINLEVEL_OUTOF10: 3

## 2024-03-30 ASSESSMENT — PAIN DESCRIPTION - LOCATION
LOCATION: LEG
LOCATION: LEG

## 2024-03-30 ASSESSMENT — PAIN DESCRIPTION - DESCRIPTORS: DESCRIPTORS: ACHING

## 2024-03-30 ASSESSMENT — PAIN SCALES - WONG BAKER: WONGBAKER_NUMERICALRESPONSE: NO HURT

## 2024-03-30 NOTE — PLAN OF CARE
Problem: Discharge Planning  Goal: Discharge to home or other facility with appropriate resources  Outcome: Progressing  Flowsheets (Taken 3/29/2024 2125)  Discharge to home or other facility with appropriate resources:   Identify barriers to discharge with patient and caregiver   Arrange for needed discharge resources and transportation as appropriate     Problem: Pain  Goal: Verbalizes/displays adequate comfort level or baseline comfort level  Outcome: Progressing  Flowsheets (Taken 3/29/2024 2125)  Verbalizes/displays adequate comfort level or baseline comfort level:   Encourage patient to monitor pain and request assistance   Assess pain using appropriate pain scale   Administer analgesics based on type and severity of pain and evaluate response   Implement non-pharmacological measures as appropriate and evaluate response     Problem: Safety - Adult  Goal: Free from fall injury  Outcome: Progressing  Flowsheets (Taken 3/29/2024 2125)  Free From Fall Injury: Instruct family/caregiver on patient safety     Problem: ABCDS Injury Assessment  Goal: Absence of physical injury  Outcome: Progressing  Flowsheets (Taken 3/29/2024 2125)  Absence of Physical Injury: Implement safety measures based on patient assessment     Problem: Skin/Tissue Integrity  Goal: Absence of new skin breakdown  Description: 1.  Monitor for areas of redness and/or skin breakdown  2.  Assess vascular access sites hourly  3.  Every 4-6 hours minimum:  Change oxygen saturation probe site  4.  Every 4-6 hours:  If on nasal continuous positive airway pressure, respiratory therapy assess nares and determine need for appliance change or resting period.  Outcome: Progressing     Problem: Nutrition Deficit:  Goal: Optimize nutritional status  Outcome: Progressing  Flowsheets (Taken 3/29/2024 2125)  Nutrient intake appropriate for improving, restoring, or maintaining nutritional needs:   Assess nutritional status and recommend course of action   Monitor

## 2024-03-30 NOTE — PROGRESS NOTES
Zana Voss  3/30/2024  [id number]    Chief Complaint: Late effects of cerebral ischemic stroke    Subjective:   Patient shakes head when asked if he has any pain or new complaints.    ROS: unable to reliably obtain    Objective:  Patient Vitals for the past 24 hrs:   BP Temp Temp src Pulse Resp SpO2   03/30/24 1030 (!) 182/77 98.6 °F (37 °C) Oral 60 18 96 %   03/29/24 2142 (!) 164/68 -- -- -- -- --   03/29/24 2059 (!) 182/68 98 °F (36.7 °C) Oral 62 16 94 %     Gen: No distress, supine in bed  HEENT: Normocephalic, atraumatic.   CV: extremities well perfused  Resp: No respiratory distress. On room air  Abd: Soft, nondistended  Ext: No edema.   Neuro: Alert, aphasic    Wt Readings from Last 3 Encounters:   03/27/24 82.5 kg (181 lb 14.1 oz)   03/21/24 95.3 kg (210 lb)   08/03/23 95.3 kg (210 lb)       Laboratory data:   Lab Results   Component Value Date    WBC 8.4 03/28/2024    HGB 13.7 03/28/2024    HCT 41.1 03/28/2024    MCV 87.0 03/28/2024     03/28/2024       Lab Results   Component Value Date/Time     03/29/2024 06:32 AM    K 3.7 03/29/2024 06:32 AM     03/29/2024 06:32 AM    CO2 26 03/29/2024 06:32 AM    BUN 34 03/29/2024 06:32 AM    CREATININE 1.0 03/29/2024 06:32 AM    GLUCOSE 109 03/29/2024 06:32 AM    CALCIUM 8.7 03/29/2024 06:32 AM        Therapy progress:  Physical therapy:  Bed Mobility:     Sit>supine:  Assistance Level: Moderate assistance  Supine>sit:  Assistance Level: Moderate assistance  Skilled Clinical Factors: with HOB elevated, mod A of 1 with increased time and bed rail  Transfers:     Sit>stand:  Assistance Level: Minimal assistance, Moderate assistance, Requires x 2 assistance  Skilled Clinical Factors: sit to stand from shower chair to [facility] x 2 reps with mod A of 2. sit to stand shower chair and commode to STEDY with min A and pt pulling on cross bar  Stand>sit:  Assistance Level: Maximum assistance  Skilled Clinical Factors: poor eccentric  control  Bed<>chair  Assistance Level: Dependent, Maximum assistance, Requires x 2 assistance  Skilled Clinical Factors: sit pivot from wc to EOB with max a of 2 with R side leading  Stand Pivot:  Assistance Level: Moderate assistance, Requires x 2 assistance  Skilled Clinical Factors: stand pivot from EOB to wc with mod A of 2. stand pivot from wc to shower chair with mod A of 2  Lateral transfer:     Car transfer:     Ambulation:  Surface: Level surface  Device: Parallel Bars (HW)  Distance: 5 ft + 7 ft in // bars with reaching for beanbag ever 2-3 steps  Assistance Level: Requires x 2 assistance, Maximum assistance, Dependent  Gait Deviations: Slow abdiaziz, Unsteady gait, Decreased step length right, Decreased weight shift bilateral  Skilled Clinical Factors: PT facilitatign RLE progression/stability during swing/stance phase, weight shift, hip extension, OT facilitatign weight shift, balance, trunk extension and RUE management/Wbing  Stairs:     Curb:     Wheelchair:  Surface: Level surface  Device: Standard wheelchair  Assistance Required to Manage Parts: Right leg rest  Assistance Level for Propulsion: Maximum assistance  Propulsion Distance: 50 ft  Skilled Clinical Factors: hand over hand to initiate propulsion with UE from PT, OT attempts hand over hand for LLE to pull chair, pt does not complete wiht assistance removed, grossly max A to maintain linear path and complete L turn    Occupational therapy:   Feeding  Assistance Level: Dependent  Skilled Clinical Factors: spitting pills/pudding out, assist to feed  Grooming/Oral Hygiene  Assistance Level: Minimal assistance  Skilled Clinical Factors: for sitting balance, minimal brushing of gums but declined further hygiene  UE Bathing  Assistance Level: Dependent  Skilled Clinical Factors: total assist with attempt at [facility]  LE Bathing  Assistance Level: Dependent  Skilled Clinical Factors: total assist, poor participation, assist of second person to stand while

## 2024-03-31 VITALS
HEART RATE: 67 BPM | BODY MASS INDEX: 28.55 KG/M2 | RESPIRATION RATE: 16 BRPM | WEIGHT: 181.88 LBS | SYSTOLIC BLOOD PRESSURE: 120 MMHG | OXYGEN SATURATION: 94 % | HEIGHT: 67 IN | TEMPERATURE: 98.1 F | DIASTOLIC BLOOD PRESSURE: 63 MMHG

## 2024-03-31 PROCEDURE — 1280000000 HC REHAB R&B

## 2024-03-31 PROCEDURE — 6370000000 HC RX 637 (ALT 250 FOR IP): Performed by: STUDENT IN AN ORGANIZED HEALTH CARE EDUCATION/TRAINING PROGRAM

## 2024-03-31 PROCEDURE — 6360000002 HC RX W HCPCS: Performed by: STUDENT IN AN ORGANIZED HEALTH CARE EDUCATION/TRAINING PROGRAM

## 2024-03-31 RX ADMIN — ENOXAPARIN SODIUM 40 MG: 100 INJECTION SUBCUTANEOUS at 10:12

## 2024-03-31 RX ADMIN — ACETAMINOPHEN 650 MG: 325 TABLET ORAL at 13:23

## 2024-03-31 RX ADMIN — NIFEDIPINE 90 MG: 30 TABLET, FILM COATED, EXTENDED RELEASE ORAL at 10:12

## 2024-03-31 RX ADMIN — CASTOR OIL AND BALSAM, PERU: 788; 87 OINTMENT TOPICAL at 21:28

## 2024-03-31 RX ADMIN — PANTOPRAZOLE SODIUM 40 MG: 40 TABLET, DELAYED RELEASE ORAL at 06:14

## 2024-03-31 RX ADMIN — CASTOR OIL AND BALSAM, PERU: 788; 87 OINTMENT TOPICAL at 10:13

## 2024-03-31 RX ADMIN — TAMSULOSIN HYDROCHLORIDE 0.4 MG: 0.4 CAPSULE ORAL at 10:12

## 2024-03-31 RX ADMIN — ASPIRIN 81 MG 81 MG: 81 TABLET ORAL at 10:12

## 2024-03-31 RX ADMIN — LISINOPRIL 10 MG: 10 TABLET ORAL at 10:12

## 2024-03-31 RX ADMIN — CLOPIDOGREL BISULFATE 75 MG: 75 TABLET ORAL at 10:12

## 2024-03-31 ASSESSMENT — PAIN SCALES - GENERAL
PAINLEVEL_OUTOF10: 3
PAINLEVEL_OUTOF10: 0
PAINLEVEL_OUTOF10: 3

## 2024-03-31 ASSESSMENT — PAIN SCALES - WONG BAKER: WONGBAKER_NUMERICALRESPONSE: NO HURT

## 2024-03-31 NOTE — PLAN OF CARE
Problem: Pain  Goal: Verbalizes/displays adequate comfort level or baseline comfort level  Outcome: Progressing One time dose pain medication given      Problem: Safety - Adult  Goal: Free from fall injury  Outcome: Progressing     Problem: Skin/Tissue Integrity  Goal: Absence of new skin breakdown  Description: 1.  Monitor for areas of redness and/or skin breakdown  2.  Assess vascular access sites hourly  3.  Every 4-6 hours minimum:  Change oxygen saturation probe site  4.  Every 4-6 hours:  If on nasal continuous positive airway pressure, respiratory therapy assess nares and determine need for appliance change or resting period.  Outcome: Progressing Pt Q 2 turn using pillow support, heels floated from bed     Problem: Skin/Tissue Integrity - Adult  Goal: Skin integrity remains intact  Outcome: Progressing

## 2024-03-31 NOTE — PROGRESS NOTES
control  Bed<>chair  Assistance Level: Dependent, Maximum assistance, Requires x 2 assistance  Skilled Clinical Factors: sit pivot from wc to EOB with max a of 2 with R side leading  Stand Pivot:  Assistance Level: Moderate assistance, Requires x 2 assistance  Skilled Clinical Factors: stand pivot from EOB to wc with mod A of 2. stand pivot from wc to shower chair with mod A of 2  Lateral transfer:     Car transfer:     Ambulation:  Surface: Level surface  Device: Parallel Bars (HW)  Distance: 5 ft + 7 ft in // bars with reaching for beanbag ever 2-3 steps  Assistance Level: Requires x 2 assistance, Maximum assistance, Dependent  Gait Deviations: Slow abdiaziz, Unsteady gait, Decreased step length right, Decreased weight shift bilateral  Skilled Clinical Factors: PT facilitatign RLE progression/stability during swing/stance phase, weight shift, hip extension, OT facilitatign weight shift, balance, trunk extension and RUE management/Wbing  Stairs:     Curb:     Wheelchair:  Surface: Level surface  Device: Standard wheelchair  Assistance Required to Manage Parts: Right leg rest  Assistance Level for Propulsion: Maximum assistance  Propulsion Distance: 50 ft  Skilled Clinical Factors: hand over hand to initiate propulsion with UE from PT, OT attempts hand over hand for LLE to pull chair, pt does not complete wiht assistance removed, grossly max A to maintain linear path and complete L turn    Occupational therapy:   Feeding  Assistance Level: Dependent  Skilled Clinical Factors: spitting pills/pudding out, assist to feed  Grooming/Oral Hygiene  Assistance Level: Minimal assistance  Skilled Clinical Factors: for sitting balance, minimal brushing of gums but declined further hygiene  UE Bathing  Assistance Level: Dependent  Skilled Clinical Factors: total assist with attempt at [facility]  LE Bathing  Assistance Level: Dependent  Skilled Clinical Factors: total assist, poor participation, assist of second person to stand while  bathing buttocks in stance  UE Dressing  Assistance Level: Dependent  Skilled Clinical Factors: assist to thread BUEs into shirt, pull over head, and pull down in front/back  LE Dressing  Assistance Level: Dependent  Skilled Clinical Factors: to change brief/pants, total assist for task  Putting On/Taking Off Footwear  Assistance Level: Dependent  Skilled Clinical Factors: to don socks  Toileting  Assistance Level: Dependent  Skilled Clinical Factors: total assist for hygiene on toilet    Speech therapy:    ADULT DIET; Regular  ADULT ORAL NUTRITION SUPPLEMENT; Breakfast, Dinner; Standard High Calorie/High Protein Oral Supplement    Body mass index is 28.49 kg/m².    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 He was admitted to [facility] on 3/24 due to functional deficits below his baseline.      Left MCA CVA  - left MCA occlusion  - with right hemiparesis, aphasia  - secondary stroke prevention with asa, statin, plavix  - PT, OT, ST    Goals of Care  - consult Palliative, appreciate assistance  - encouraging patient to eat and drink more    Thrush  - nystatin      Hypokalemia  - replace PRN     HTN  - switched amlodipine to nifedipine, increased to 90 mg for 3/30  -Start Lisinopril 10 mg 3/30     BPH  - flomax     Bowels: adjust medications as needed for regular bowel movements     Bladder: Check PVR x 3.  IMC if PVR > 200ml or if any volume is > 500 ml.      Sleep: melatonin provided prn.      PPX  DVT: lovenox  GI: pantoprazole     Follow up appointments: PCP    Therapy progress: demonstrated improved bed mobility with mod A   Services: TBD   EDOD: 4/13    Chris Allen, DO 3/31/2024, 7:34 PM

## 2024-04-01 LAB
ANION GAP SERPL CALCULATED.3IONS-SCNC: 14 MMOL/L (ref 3–16)
BASOPHILS # BLD: 0.1 K/UL (ref 0–0.2)
BASOPHILS NFR BLD: 0.8 %
BUN SERPL-MCNC: 45 MG/DL (ref 7–20)
CALCIUM SERPL-MCNC: 8.3 MG/DL (ref 8.3–10.6)
CHLORIDE SERPL-SCNC: 105 MMOL/L (ref 99–110)
CO2 SERPL-SCNC: 22 MMOL/L (ref 21–32)
CREAT SERPL-MCNC: 1.2 MG/DL (ref 0.8–1.3)
DEPRECATED RDW RBC AUTO: 15.7 % (ref 12.4–15.4)
EOSINOPHIL # BLD: 0.1 K/UL (ref 0–0.6)
EOSINOPHIL NFR BLD: 1.3 %
GFR SERPLBLD CREATININE-BSD FMLA CKD-EPI: 56 ML/MIN/{1.73_M2}
GLUCOSE SERPL-MCNC: 87 MG/DL (ref 70–99)
HCT VFR BLD AUTO: 41.4 % (ref 40.5–52.5)
HGB BLD-MCNC: 13.8 G/DL (ref 13.5–17.5)
LYMPHOCYTES # BLD: 1.6 K/UL (ref 1–5.1)
LYMPHOCYTES NFR BLD: 18.5 %
MCH RBC QN AUTO: 29.3 PG (ref 26–34)
MCHC RBC AUTO-ENTMCNC: 33.3 G/DL (ref 31–36)
MCV RBC AUTO: 88 FL (ref 80–100)
MONOCYTES # BLD: 0.8 K/UL (ref 0–1.3)
MONOCYTES NFR BLD: 9.1 %
NEUTROPHILS # BLD: 6 K/UL (ref 1.7–7.7)
NEUTROPHILS NFR BLD: 70.3 %
PLATELET # BLD AUTO: 261 K/UL (ref 135–450)
PMV BLD AUTO: 8 FL (ref 5–10.5)
POTASSIUM SERPL-SCNC: 3.8 MMOL/L (ref 3.5–5.1)
RBC # BLD AUTO: 4.71 M/UL (ref 4.2–5.9)
SODIUM SERPL-SCNC: 141 MMOL/L (ref 136–145)
WBC # BLD AUTO: 8.5 K/UL (ref 4–11)

## 2024-04-01 PROCEDURE — 92526 ORAL FUNCTION THERAPY: CPT

## 2024-04-01 PROCEDURE — 80048 BASIC METABOLIC PNL TOTAL CA: CPT

## 2024-04-01 PROCEDURE — 6370000000 HC RX 637 (ALT 250 FOR IP): Performed by: STUDENT IN AN ORGANIZED HEALTH CARE EDUCATION/TRAINING PROGRAM

## 2024-04-01 PROCEDURE — 36415 COLL VENOUS BLD VENIPUNCTURE: CPT

## 2024-04-01 PROCEDURE — 2580000003 HC RX 258: Performed by: STUDENT IN AN ORGANIZED HEALTH CARE EDUCATION/TRAINING PROGRAM

## 2024-04-01 PROCEDURE — 97112 NEUROMUSCULAR REEDUCATION: CPT

## 2024-04-01 PROCEDURE — 85025 COMPLETE CBC W/AUTO DIFF WBC: CPT

## 2024-04-01 PROCEDURE — 92507 TX SP LANG VOICE COMM INDIV: CPT

## 2024-04-01 PROCEDURE — 6360000002 HC RX W HCPCS: Performed by: STUDENT IN AN ORGANIZED HEALTH CARE EDUCATION/TRAINING PROGRAM

## 2024-04-01 PROCEDURE — 97530 THERAPEUTIC ACTIVITIES: CPT

## 2024-04-01 PROCEDURE — 1280000000 HC REHAB R&B

## 2024-04-01 PROCEDURE — 97535 SELF CARE MNGMENT TRAINING: CPT

## 2024-04-01 PROCEDURE — 97542 WHEELCHAIR MNGMENT TRAINING: CPT

## 2024-04-01 RX ORDER — SODIUM CHLORIDE 9 MG/ML
INJECTION, SOLUTION INTRAVENOUS CONTINUOUS
Status: ACTIVE | OUTPATIENT
Start: 2024-04-01 | End: 2024-04-01

## 2024-04-01 RX ADMIN — ASPIRIN 81 MG 81 MG: 81 TABLET ORAL at 08:50

## 2024-04-01 RX ADMIN — ATORVASTATIN CALCIUM 80 MG: 80 TABLET, FILM COATED ORAL at 19:17

## 2024-04-01 RX ADMIN — NYSTATIN 500000 UNITS: 100000 SUSPENSION ORAL at 17:42

## 2024-04-01 RX ADMIN — NYSTATIN 500000 UNITS: 100000 SUSPENSION ORAL at 08:51

## 2024-04-01 RX ADMIN — NYSTATIN 500000 UNITS: 100000 SUSPENSION ORAL at 19:17

## 2024-04-01 RX ADMIN — CASTOR OIL AND BALSAM, PERU: 788; 87 OINTMENT TOPICAL at 11:02

## 2024-04-01 RX ADMIN — DORZOLAMIDE HYDROCHLORIDE AND TIMOLOL MALEATE 1 DROP: 20; 5 SOLUTION/ DROPS OPHTHALMIC at 19:17

## 2024-04-01 RX ADMIN — CLOPIDOGREL BISULFATE 75 MG: 75 TABLET ORAL at 08:51

## 2024-04-01 RX ADMIN — SODIUM CHLORIDE: 9 INJECTION, SOLUTION INTRAVENOUS at 11:06

## 2024-04-01 RX ADMIN — ENOXAPARIN SODIUM 40 MG: 100 INJECTION SUBCUTANEOUS at 08:52

## 2024-04-01 RX ADMIN — CASTOR OIL AND BALSAM, PERU: 788; 87 OINTMENT TOPICAL at 19:17

## 2024-04-01 RX ADMIN — LATANOPROST 1 DROP: 50 SOLUTION OPHTHALMIC at 19:17

## 2024-04-01 ASSESSMENT — PAIN SCALES - WONG BAKER
WONGBAKER_NUMERICALRESPONSE: NO HURT
WONGBAKER_NUMERICALRESPONSE: NO HURT

## 2024-04-01 ASSESSMENT — PAIN SCALES - GENERAL
PAINLEVEL_OUTOF10: 0
PAINLEVEL_OUTOF10: 0

## 2024-04-01 NOTE — PROGRESS NOTES
Comprehensive Nutrition Assessment    Type and Reason for Visit:  Reassess    Nutrition Recommendations/Plan:   Continue regular diet   Encourage po intakes  Ensure ONS with meals - Add Magic Cup BID  Due to inadequate intake, discussion of palliative care vs nutrition support should be considered. Consult dietitian if tube feeding is desired and consistent with patient's plan of care.  Monitor po intakes, nutrition adequacy, weights, pertinent labs, BMs     Malnutrition Assessment:  Malnutrition Status:  At risk for malnutrition (Comment) (03/25/24 8584)      Nutrition Assessment:    Follow up: Pt declining from a nutrition standpoint AEB continued poor po intakes. Pt currently on a regular diet with Ensure BID. PO intakes 0-25% per EMR. Pt sleeping soundly at time of visit. RN reports that pt has hardly been eating anything and has not been drinking water well without encouragement. Pt is drinking a little bit of his Ensure per RN. Pt with IRMA now and IVF ordered per MD note. Will trial Magic Cup and send Ensure TID. Recommend Palliative consult to discuss GOC. Will nealior.    Nutrition Related Findings:    BM x 1 on 3/29. Labs reviewed. Wound Type: None (Abrasion rt knee)       Current Nutrition Intake & Therapies:    Average Meal Intake: 0%, 1-25%  Average Supplements Intake: 0%, 1-25%  ADULT DIET; Regular  ADULT ORAL NUTRITION SUPPLEMENT; Breakfast, Dinner; Standard High Calorie/High Protein Oral Supplement    Anthropometric Measures:  Height: 170.2 cm (5' 7\")  Ideal Body Weight (IBW): 148 lbs (67 kg)       Current Body Weight: 82.5 kg (181 lb 14.1 oz),   IBW. Weight Source: Bed Scale  Current BMI (kg/m2): 28.5                          BMI Categories: Overweight (BMI 25.0-29.9)    Estimated Daily Nutrient Needs:  Energy Requirements Based On: Kcal/kg  Weight Used for Energy Requirements: Ideal  Energy (kcal/day): 1534-5284  Weight Used for Protein Requirements: Ideal  Protein (g/day): 67-81  Method Used for  Fluid Requirements: 1 ml/kcal  Fluid (ml/day): 1994-9494    Nutrition Diagnosis:   Inadequate oral intake related to inadequate protein-energy intake as evidenced by poor intake prior to admission    Nutrition Interventions:   Food and/or Nutrient Delivery: Continue Current Diet, Modify Oral Nutrition Supplement, Start Oral Nutrition Supplement  Nutrition Education/Counseling: No recommendation at this time, Education declined  Coordination of Nutrition Care: Continue to monitor while inpatient       Goals:  Previous Goal Met: No Progress toward Goal(s)  Goals: PO intake 50% or greater, prior to discharge       Nutrition Monitoring and Evaluation:   Behavioral-Environmental Outcomes: None Identified  Food/Nutrient Intake Outcomes: Food and Nutrient Intake, Supplement Intake  Physical Signs/Symptoms Outcomes: Biochemical Data, Weight    Discharge Planning:    Continue current diet, Continue Oral Nutrition Supplement     Julisa Gaines RD, LD  Contact: [phone]

## 2024-04-01 NOTE — PLAN OF CARE
Problem: Safety - Adult  Goal: Free from fall injury  4/1/2024 1021 by Yaya Patino RN  Outcome: Progressing

## 2024-04-01 NOTE — PROGRESS NOTES
2  Balance  Sitting Balance: Maximum assistance  Standing Balance: Dependent/Total  Standing Balance  Activity: sitting EOB initially with mad A progressing to SBA without trunk support however if pt is asked to move or therapist assisted to don shoes, pt becomes max a for sitting balance  Comments: mod A of 2 while 2nd person donned pants in standing with 1-2 UE support  Sit to Stand  Assistance Level: Moderate assistance;Requires x 2 assistance  Skilled Clinical Factors: sit to stand from EOB to HHA, w/c to // bars with mod A of 2  Stand to Sit  Assistance Level: Maximum assistance  Stand Pivot  Assistance Level: Moderate assistance;Requires x 2 assistance  Skilled Clinical Factors: stand pivot from w/c <> EOB and w/c <> low mat with mod a of 2      Environmental Mobility  Ambulation  Surface: Level surface  Device: Parallel Bars  Distance: 2 steps  Assistance Level: Requires x 2 assistance;Maximum assistance;Dependent  Gait Deviations: Slow abdiaziz;Unsteady gait;Decreased step length right;Decreased weight shift bilateral  Skilled Clinical Factors: PT facilitatign RLE progression/stability during swing/stance phase, weight shift, hip extension, OT facilitatign weight shift, balance, trunk extension and RUE management/Wbing  Wheelchair  Surface: Level surface  Device: Standard wheelchair  Assistance Required to Manage Parts: Right leg rest  Assistance Level for Propulsion: Maximum assistance  Propulsion Distance: 50 ft  Skilled Clinical Factors: hand over hand to initiate propulsion with UE from OT, PT facilitating hand over hand LLE movemetn to attempt for pt to progress chair more indepdently. pt will continue LE motion for 2-3 strokes with tactile cues removed, then stops. pt unable to utilize LLE for propulsion/steering assistance      Neuromuscular Education  NDT Treatment: Sitting  Neuromuscular Comments: pt completed following activities seated on low mat: with hand over hand to facilitate grasp on heide eleazar,  pt completed x10 rows, x10 R+L trunk rotation adn approx 8 heide eleazar taps to specific targets with PT providing mod-max a at trunk for sitting balance and faciliating head motions to attend to R side and OT facilitating activity/ UE movement    Pt in bed at end of session with call light/needs within reach and alarm donned.     ASSESSMENT/PROGRESS TOWARDS GOALS  Vital Signs  Pulse: 59  Heart Rate Source: Monitor  BP: (!) 125/58  BP Location: Left upper arm  MAP (Calculated): 80  SpO2: 94 %  O2 Device: None (Room air)    Assessment  Assessment: pt seen as co treat with OT for increased safety progressing functional mobility. pt requires increased assist with bed mobility, sitting balance and demos decreased activity tolerance this am. mod A of 2 for be dmobility, mod-max a fo 2 for stand pivot transfers, tolerated 2 steps of gait training in // bars. anticipate at this time pt will require SNF upon d/c and DME defer to next facility  Activity Tolerance: Patient tolerated treatment well  Discharge Recommendations: Subacute/Skilled Nursing Facility  PT Equipment Recommendations  Equipment Needed: Yes  Other: TBD    Goals  Patient Goals   Patient Goals : did not verbalize  Short Term Goals  Time Frame for Short Term Goals: 4/1  Short Term Goal 1: pt will complete STS to platform walker with MAX A x2- GOAL MET, stands with mod A fo 2 to HHA  Short Term Goal 2: pt will perform supine to sit with MOD A- GOAL NOT MET, mod A of 2  Short Term Goal 3: pt will perform bed to chair with stedy and MOD A- GOAL MET with STEDY and min a fo 1  Short Term Goal 4: pt will tolerate gait assessment if/when appropriate for LTG to be set- GOAL MET 3/28, pt ambulated 5 ft iwth HW and max A of 2  Short Term Goal 5: pt will propel WC 50 ft with domitila technique and MOD A- GOAL NOT MET, requires max a to TD  Long Term Goals  Time Frame for Long Term Goals : 4/13  Long Term Goal 1: pt will perform STS to platform walker with MOD A  Long Term Goal

## 2024-04-01 NOTE — PROGRESS NOTES
Occupational Therapy  Facility/Department: [facility]  Rehabilitation Occupational Therapy Daily Treatment Note    Date: 24  Patient Name: Zana Voss       Room: 0151/0151-01  MRN: [id number]  Account: [id number]   : 1931  (89 y.o.) Gender: male                    Past Medical History:  has a past medical history of COVID, GERD with esophagitis, Hearing loss, [place] (hard of hearing), Hyperlipidemia, Hypertension, Melanoma (HCC), TIA (transient ischemic attack), and Wears dentures.  Past Surgical History:   has a past surgical history that includes eye surgery; hip surgery (Right); Femur Surgery (Left); other surgical history (Right, 2017); Upper gastrointestinal endoscopy (N/A, 2022); Colonoscopy (N/A, 2022); and Colonoscopy (N/A, 2022).    Restrictions  Restrictions/Precautions: Fall Risk, Up as Tolerated, General Precautions  Other position/activity restrictions: up with assistance, R weakness, expressive/receptive aphasia  Required Braces or Orthoses?: No    Subjective  Subjective: Pt in bed, more tired/fatigued during session, no s/s of pain except for occasional movements with grimace, agreeable to OT/PT session with encouragement, /58, HR 59, O2 sats 95% on RA.  Restrictions/Precautions: Fall Risk;Up as Tolerated;General Precautions             Objective     Cognition  Overall Cognitive Status: Exceptions  Arousal/Alertness: Delayed responses to stimuli  Following Commands: Inconsistently follows commands  Attention Span: Attends with cues to redirect;Difficulty attending to directions;Difficulty dividing attention  Memory: Decreased recall of biographical Information;Decreased recall of precautions;Decreased recall of recent events;Decreased short term memory  Safety Judgement: Decreased awareness of need for assistance;Decreased awareness of need for safety  Problem Solving: Assistance required to generate solutions;Assistance required to identify errors

## 2024-04-01 NOTE — PROGRESS NOTES
PALLIATIVE MEDICINE PROGRESS NOTE     Patient name:Zana Voss    MRN:[id number] :1931  Room/Bed:0151/0151-01    LOS: 8 days        ASSESSMENT/RECOMMENDATIONS     89 y.o. male with recent left MCA CVA.  Admitted to ARU 3/24/24 for functional deficits below his baseline.     Symptom management     1) Goals of care: Maximize function for now.  Per family, patient not likely to agree to SNF.  Family concerned about patient's poor PO intake.  Family is open to considering possible comfort care/hospice if patient continues with poor intake and lack of progress in ARU.  Family will re-evaluate GOC closer to ARU discharge when patient's needs are likely to be more clear.  Code status is Limited x 4 Nos.  Will follow    2) Debility - due to acute CVA with right hemiparesis and aphasia. Admitted to ARU 3/25/24 and is making progress, although not consistently working with therapy. At this time it does not appear patient will be able to return home independently and will need continued therapy and reassessment for best plan going forward. Family does not believe patient will be readily accepting of SNF placement.           Patient/Family Goals of Care :    24    Spoke with Ronan EDWARDS.  Explained role of palliative care.  Ronan agreeable to discussion with me today.  Updated him on hospital course thus far and answered questions.      Ronan states he knows patient just wants to come home.  He does not think patient will ever agree to SNF or LTC.  He says patient has started to decline since he had COVID, but with the stroke, there has now been a massive decline.  We discussed patient's independence and   personality and how hard this decline likely is on him mentally/emotionally.   Ronan says he is not sure what to expect and does not know that there will be any great improvement.  He says his biggest concern is that patient is not eating.  We discussed possible reasons for this and potential

## 2024-04-01 NOTE — PROGRESS NOTES
[facility]: ACUTE REHAB UNIT  SPEECH-LANGUAGE PATHOLOGY      [x] Daily  [] Weekly Care Conference Note  [] Discharge    Patient:Zana Voss      :1931  MRN:[id number]  Rehab Dx/Hx: Late effects of cerebral ischemic stroke [I69.30]    Precautions: falls and aspirations  Home situation: Home with grandson; retired; primary for medications, finances, and homemaking tasks PTA; active  PTA  ST Dx: [x] Aphasia  [x] Dysarthria  [] Apraxia   [x] Oropharyngeal dysphagia [] Cognitive Impairment  [] Other:   Date of Admit: 3/24/2024  Room #: 0151/0151-01    Current functional status (updated daily):         Pt being seen for : [x] Speech/Language Treatment  [x] Dysphagia Treatment [] Cognitive Treatment  [] Other:  Communication: []WFL  [x] Aphasia  [x] Dysarthria  [] Apraxia  [] Pragmatic Impairment [] Non-verbal  [] Hearing Loss  [] Other:   Cognition: [] WFL  [] Mild  [] Moderate  [] Severe [x] Unable to Assess d/t severity of aphasia  [] Other:  Memory: [] WFL  [] Mild  [] Moderate  [] Severe [x] Unable to Assess d/t severity of aphasia  [] Other:  Behavior: [x] Alert  [] Cooperative  []  Pleasant  [] Confused  [x] Agitated  [x] Uncooperative  [x] Distractible [] Motivated  [] Self-Limiting [] Anxious  [] Other:  Endurance:  [] Adequate for participation in SLP sessions  [x] Reduced overall  [] Lethargic  [] Other:  Safety: [] No concerns at this time  [] Reduced insight into deficits  []  Reduced safety awareness [] Not following call light procedures  [x] Unable to Assess  [] Other:    Current Diet Order:ADULT DIET; Regular  ADULT ORAL NUTRITION SUPPLEMENT; Breakfast, Dinner; Standard High Calorie/High Protein Oral Supplement  Swallowing Precautions: upright for all intake, stay upright for at least 30 mins after intake, small bites/sips, close supervision, oral care 2-3x/day to reduce adverse affects in the event of aspiration, increase physical mobility as able, STRICT aspiration precautions, and hold PO

## 2024-04-01 NOTE — PLAN OF CARE
Problem: Discharge Planning  Goal: Discharge to home or other facility with appropriate resources  Outcome: Progressing     Problem: Pain  Goal: Verbalizes/displays adequate comfort level or baseline comfort level  Outcome: Progressing     Problem: Safety - Adult  Goal: Free from fall injury  Outcome: Progressing     Problem: ABCDS Injury Assessment  Goal: Absence of physical injury  Outcome: Progressing     Problem: Skin/Tissue Integrity  Goal: Absence of new skin breakdown  Description: 1.  Monitor for areas of redness and/or skin breakdown  2.  Assess vascular access sites hourly  3.  Every 4-6 hours minimum:  Change oxygen saturation probe site  4.  Every 4-6 hours:  If on nasal continuous positive airway pressure, respiratory therapy assess nares and determine need for appliance change or resting period.  Outcome: Progressing     Problem: Nutrition Deficit:  Goal: Optimize nutritional status  Outcome: Progressing     Problem: Neurosensory - Adult  Goal: Achieves stable or improved neurological status  Outcome: Progressing     Problem: Respiratory - Adult  Goal: Achieves optimal ventilation and oxygenation  Outcome: Progressing     Problem: Cardiovascular - Adult  Goal: Maintains optimal cardiac output and hemodynamic stability  Outcome: Progressing     Problem: Skin/Tissue Integrity - Adult  Goal: Skin integrity remains intact  Outcome: Progressing     Problem: Musculoskeletal - Adult  Goal: Return mobility to safest level of function  Outcome: Progressing     Problem: Gastrointestinal - Adult  Goal: Minimal or absence of nausea and vomiting  Outcome: Progressing     Problem: Genitourinary - Adult  Goal: Absence of urinary retention  Outcome: Progressing     Problem: Anxiety  Goal: Will report anxiety at manageable levels  Description: INTERVENTIONS:  1. Administer medication as ordered  2. Teach and rehearse alternative coping skills  3. Provide emotional support with 1:1 interaction with staff  Outcome:

## 2024-04-01 NOTE — PROGRESS NOTES
Zana Voss  4/1/2024  [id number]    Chief Complaint: Late effects of cerebral ischemic stroke    Subjective:   No acute events overnight. Patient with IRMA this morning, IV fluids ordered. Today Zana is seen in his room, resting in bed. History is limited from patient. He appears comfortable and does not demonstrate any pain behaviors. Encouraged patient to eat and drink.     Provided update to maria eugenia Ferraro over the phone.     ROS: unable to reliably obtain    Objective:  Patient Vitals for the past 24 hrs:   BP Temp Temp src Pulse Resp SpO2   04/01/24 0845 (!) 107/53 -- -- 65 -- --   04/01/24 0840 (!) 103/55 -- -- 65 -- --   04/01/24 0730 120/66 97.9 °F (36.6 °C) Oral 67 16 --   03/31/24 2115 120/63 98.1 °F (36.7 °C) Oral 67 16 94 %     Gen: No distress, supine in bed  HEENT: Normocephalic, atraumatic.   CV: extremities well perfused  Resp: No respiratory distress. On room air  Abd: Soft, nondistended  Ext: No edema.   Neuro: Alert, aphasic    Wt Readings from Last 3 Encounters:   03/27/24 82.5 kg (181 lb 14.1 oz)   03/21/24 95.3 kg (210 lb)   08/03/23 95.3 kg (210 lb)       Laboratory data:   Lab Results   Component Value Date    WBC 8.5 04/01/2024    HGB 13.8 04/01/2024    HCT 41.4 04/01/2024    MCV 88.0 04/01/2024     04/01/2024       Lab Results   Component Value Date/Time     04/01/2024 06:28 AM    K 3.8 04/01/2024 06:28 AM     04/01/2024 06:28 AM    CO2 22 04/01/2024 06:28 AM    BUN 45 04/01/2024 06:28 AM    CREATININE 1.2 04/01/2024 06:28 AM    GLUCOSE 87 04/01/2024 06:28 AM    CALCIUM 8.3 04/01/2024 06:28 AM        Therapy progress:  Physical therapy:  Bed Mobility:     Sit>supine:  Assistance Level: Moderate assistance  Supine>sit:  Assistance Level: Moderate assistance  Skilled Clinical Factors: with HOB elevated, mod A of 1 with increased time and bed rail  Transfers:     Sit>stand:  Assistance Level: Minimal assistance, Moderate assistance, Requires x 2 assistance  Skilled Clinical

## 2024-04-02 LAB
ANION GAP SERPL CALCULATED.3IONS-SCNC: 11 MMOL/L (ref 3–16)
BUN SERPL-MCNC: 44 MG/DL (ref 7–20)
CALCIUM SERPL-MCNC: 8.3 MG/DL (ref 8.3–10.6)
CHLORIDE SERPL-SCNC: 108 MMOL/L (ref 99–110)
CO2 SERPL-SCNC: 25 MMOL/L (ref 21–32)
CREAT SERPL-MCNC: 1 MG/DL (ref 0.8–1.3)
GFR SERPLBLD CREATININE-BSD FMLA CKD-EPI: 70 ML/MIN/{1.73_M2}
GLUCOSE SERPL-MCNC: 100 MG/DL (ref 70–99)
POTASSIUM SERPL-SCNC: 3.8 MMOL/L (ref 3.5–5.1)
SODIUM SERPL-SCNC: 144 MMOL/L (ref 136–145)

## 2024-04-02 PROCEDURE — 6370000000 HC RX 637 (ALT 250 FOR IP): Performed by: STUDENT IN AN ORGANIZED HEALTH CARE EDUCATION/TRAINING PROGRAM

## 2024-04-02 PROCEDURE — 92507 TX SP LANG VOICE COMM INDIV: CPT

## 2024-04-02 PROCEDURE — 92526 ORAL FUNCTION THERAPY: CPT

## 2024-04-02 PROCEDURE — 6360000002 HC RX W HCPCS: Performed by: STUDENT IN AN ORGANIZED HEALTH CARE EDUCATION/TRAINING PROGRAM

## 2024-04-02 PROCEDURE — 80048 BASIC METABOLIC PNL TOTAL CA: CPT

## 2024-04-02 PROCEDURE — 97530 THERAPEUTIC ACTIVITIES: CPT

## 2024-04-02 PROCEDURE — 1280000000 HC REHAB R&B

## 2024-04-02 PROCEDURE — 97535 SELF CARE MNGMENT TRAINING: CPT

## 2024-04-02 PROCEDURE — 97112 NEUROMUSCULAR REEDUCATION: CPT

## 2024-04-02 PROCEDURE — 36415 COLL VENOUS BLD VENIPUNCTURE: CPT

## 2024-04-02 RX ORDER — MEGESTROL ACETATE 20 MG/1
20 TABLET ORAL DAILY
Status: DISCONTINUED | OUTPATIENT
Start: 2024-04-02 | End: 2024-04-05 | Stop reason: HOSPADM

## 2024-04-02 RX ADMIN — PANTOPRAZOLE SODIUM 40 MG: 40 TABLET, DELAYED RELEASE ORAL at 05:28

## 2024-04-02 RX ADMIN — CASTOR OIL AND BALSAM, PERU: 788; 87 OINTMENT TOPICAL at 21:15

## 2024-04-02 RX ADMIN — NYSTATIN 500000 UNITS: 100000 SUSPENSION ORAL at 21:13

## 2024-04-02 RX ADMIN — NIFEDIPINE 90 MG: 30 TABLET, FILM COATED, EXTENDED RELEASE ORAL at 08:19

## 2024-04-02 RX ADMIN — Medication 5 MG: at 21:13

## 2024-04-02 RX ADMIN — CASTOR OIL AND BALSAM, PERU: 788; 87 OINTMENT TOPICAL at 12:08

## 2024-04-02 RX ADMIN — NYSTATIN 500000 UNITS: 100000 SUSPENSION ORAL at 12:06

## 2024-04-02 RX ADMIN — ASPIRIN 81 MG 81 MG: 81 TABLET ORAL at 08:19

## 2024-04-02 RX ADMIN — ENOXAPARIN SODIUM 40 MG: 100 INJECTION SUBCUTANEOUS at 08:19

## 2024-04-02 RX ADMIN — DORZOLAMIDE HYDROCHLORIDE AND TIMOLOL MALEATE 1 DROP: 20; 5 SOLUTION/ DROPS OPHTHALMIC at 21:14

## 2024-04-02 RX ADMIN — CLOPIDOGREL BISULFATE 75 MG: 75 TABLET ORAL at 08:19

## 2024-04-02 RX ADMIN — TAMSULOSIN HYDROCHLORIDE 0.4 MG: 0.4 CAPSULE ORAL at 08:19

## 2024-04-02 RX ADMIN — MEGESTROL ACETATE 20 MG: 20 TABLET ORAL at 12:06

## 2024-04-02 RX ADMIN — ATORVASTATIN CALCIUM 80 MG: 80 TABLET, FILM COATED ORAL at 21:13

## 2024-04-02 RX ADMIN — LATANOPROST 1 DROP: 50 SOLUTION OPHTHALMIC at 21:14

## 2024-04-02 RX ADMIN — NYSTATIN 500000 UNITS: 100000 SUSPENSION ORAL at 09:00

## 2024-04-02 ASSESSMENT — PAIN SCALES - GENERAL
PAINLEVEL_OUTOF10: 0
PAINLEVEL_OUTOF10: 0

## 2024-04-02 ASSESSMENT — PAIN SCALES - WONG BAKER: WONGBAKER_NUMERICALRESPONSE: NO HURT

## 2024-04-02 NOTE — PLAN OF CARE
Problem: Discharge Planning  Goal: Discharge to home or other facility with appropriate resources  Outcome: Progressing     Problem: Pain  Goal: Verbalizes/displays adequate comfort level or baseline comfort level  Outcome: Progressing     Problem: Safety - Adult  Goal: Free from fall injury  4/1/2024 2047 by Mica Sheikh RN  Outcome: Progressing  4/1/2024 1021 by Yaya Patino RN  Outcome: Progressing     Problem: ABCDS Injury Assessment  Goal: Absence of physical injury  Outcome: Progressing     Problem: Skin/Tissue Integrity  Goal: Absence of new skin breakdown  Description: 1.  Monitor for areas of redness and/or skin breakdown  2.  Assess vascular access sites hourly  3.  Every 4-6 hours minimum:  Change oxygen saturation probe site  4.  Every 4-6 hours:  If on nasal continuous positive airway pressure, respiratory therapy assess nares and determine need for appliance change or resting period.  Outcome: Progressing     Problem: Nutrition Deficit:  Goal: Optimize nutritional status  Outcome: Progressing  Flowsheets (Taken 4/1/2024 1410 by Julisa Gaines, RD, LD)  Nutrient intake appropriate for improving, restoring, or maintaining nutritional needs:   Assess nutritional status and recommend course of action   Monitor oral intake, labs, and treatment plans   Recommend appropriate diets, oral nutritional supplements, and vitamin/mineral supplements     Problem: Neurosensory - Adult  Goal: Achieves stable or improved neurological status  Outcome: Progressing     Problem: Respiratory - Adult  Goal: Achieves optimal ventilation and oxygenation  Outcome: Progressing     Problem: Cardiovascular - Adult  Goal: Maintains optimal cardiac output and hemodynamic stability  Outcome: Progressing     Problem: Skin/Tissue Integrity - Adult  Goal: Skin integrity remains intact  Outcome: Progressing     Problem: Musculoskeletal - Adult  Goal: Return mobility to safest level of function  Outcome: Progressing

## 2024-04-02 NOTE — PLAN OF CARE
Problem: Safety - Adult  Goal: Free from fall injury  Outcome: Progressing     Problem: Nutrition Deficit:  Goal: Optimize nutritional status  Outcome: Not Progressing     Problem: Nutrition Deficit:  Goal: Optimize nutritional status  Outcome: Not Progressing

## 2024-04-02 NOTE — PROGRESS NOTES
Physical Therapy  Facility/Department: [facility]  Rehabilitation Physical Therapy Treatment Note    NAME: Zana Voss  : 1931 (89 y.o.)  MRN: [id number]  CODE STATUS: Limited    Date of Service: 24       Restrictions:  Restrictions/Precautions: Fall Risk, Up as Tolerated, General Precautions  Position Activity Restriction  Other position/activity restrictions: up with assistance, R weakness, expressive/receptive aphasia     SUBJECTIVE  Subjective  Subjective: pt found in recliner  Pain: indicates pain with RUE and LE movement, unable to rate       OBJECTIVE  Cognition  Overall Cognitive Status: Exceptions  Arousal/Alertness: Delayed responses to stimuli  Following Commands: Inconsistently follows commands  Attention Span: Attends with cues to redirect;Difficulty attending to directions;Difficulty dividing attention  Memory: Decreased recall of biographical Information;Decreased recall of precautions;Decreased recall of recent events;Decreased short term memory  Safety Judgement: Decreased awareness of need for assistance;Decreased awareness of need for safety  Problem Solving: Assistance required to generate solutions;Assistance required to identify errors made;Assistance required to correct errors made;Assistance required to implement solutions;Decreased awareness of errors  Insights: Decreased awareness of deficits  Initiation: Requires cues for all  Sequencing: Requires cues for all  Cognition Comment: limited by aphasia  Orientation  Overall Orientation Status: Impaired  Orientation Level: Unable to assess    Functional Mobility  Roll Left  Assistance Level: Moderate assistance  Roll Right  Assistance Level: Contact guard assist  Sit to Supine  Assistance Level: Moderate assistance;Requires x 2 assistance  Balance  Standing Balance: Dependent/Total  Standing Balance  Activity: pt standing in STEDY before and after commode use. pt requires mod-max a for static standing in STEDY with BLE blocked  2: pt will perform supine to sit with MOD A- GOAL NOT MET, mod A of 2  Short Term Goal 3: pt will perform bed to chair with stedy and MOD A- GOAL MET with STEDY and min a fo 1  Short Term Goal 4: pt will tolerate gait assessment if/when appropriate for LTG to be set- GOAL MET 3/28, pt ambulated 5 ft iwth HW and max A of 2  Short Term Goal 5: pt will propel WC 50 ft with domitila technique and MOD A- GOAL NOT MET, requires max a to TD  Long Term Goals  Time Frame for Long Term Goals : 4/13  Long Term Goal 1: pt will perform STS to platform walker with MOD A  Long Term Goal 2: pt will transfer bed to chair with platform walker and MAX A  Long Term Goal 3: pt will perform supine <> sit with MIN A  Long Term Goal 4: pt will propel WC 50 ft with CGA  Long Term Goal 5: gait goal to be set 3/29: pt will ambulate 10 ft with min A of 1 and LRAD.    PLAN OF CARE/SAFETY  Physical Therapy Plan  General Plan: 5-7 times per week  Current Treatment Recommendations: Strengthening;ROM;Balance training;Endurance training;Functional mobility training;Gait training;Patient/Caregiver education & training;Neuromuscular re-education;Equipment evaluation, education, & procurement;Therapeutic activities;Co-Treatment;Home exercise program;Safety education & training    EDUCATION  Education  Education Given To: Patient  Education Provided: Role of Therapy;Plan of Care;Precautions;Safety;Transfer Training;Cognition;Equipment;Fall Prevention Strategies;Home Exercise Program;Mobility Training  Education Provided Comments: role of PT< safe transfers/gait mechanics  Education Method: Verbal;Demonstration  Barriers to Learning: Cognition;Hearing        Therapy Time   Individual Concurrent Group Co-treatment   Time In       0930   Time Out       1030   Minutes       60     Timed Code Treatment Minutes: 60 Minutes       Vilma Kimbrough PT, 04/02/24 at 11:43 AM

## 2024-04-02 NOTE — PROGRESS NOTES
Zana Voss  4/2/2024  [id number]    Chief Complaint: Late effects of cerebral ischemic stroke    Subjective:   No acute events overnight. Today Zana is seen in his room, seated up in the chair. History limited due to aphasia. He appears comfortable and does not demonstrate any pain behaviors. Patient only took a few bites of his oatmeal. Offered ensure and he takes a small sip. Planning on starting appetite stimulant.     ROS: unable to reliably obtain    Objective:  Patient Vitals for the past 24 hrs:   BP Temp Temp src Pulse Resp SpO2   04/02/24 0715 135/63 98.2 °F (36.8 °C) Oral 59 16 92 %   04/01/24 1915 (!) 147/64 98.1 °F (36.7 °C) Oral 58 16 92 %   04/01/24 1143 (!) 125/58 -- -- 59 -- 94 %     Gen: No distress, seated up in bed  HEENT: Normocephalic, atraumatic.   CV: RRR  Resp: No respiratory distress. Lungs CTAB  Abd: Soft, nondistended  Ext: No edema.   Neuro: Alert, aphasic    Wt Readings from Last 3 Encounters:   03/27/24 82.5 kg (181 lb 14.1 oz)   03/21/24 95.3 kg (210 lb)   08/03/23 95.3 kg (210 lb)       Laboratory data:   Lab Results   Component Value Date    WBC 8.5 04/01/2024    HGB 13.8 04/01/2024    HCT 41.4 04/01/2024    MCV 88.0 04/01/2024     04/01/2024       Lab Results   Component Value Date/Time     04/02/2024 06:26 AM    K 3.8 04/02/2024 06:26 AM     04/02/2024 06:26 AM    CO2 25 04/02/2024 06:26 AM    BUN 44 04/02/2024 06:26 AM    CREATININE 1.0 04/02/2024 06:26 AM    GLUCOSE 100 04/02/2024 06:26 AM    CALCIUM 8.3 04/02/2024 06:26 AM        Therapy progress:  Physical therapy:  Bed Mobility:     Sit>supine:  Assistance Level: Moderate assistance, Requires x 2 assistance  Skilled Clinical Factors: 2 person assist this am  Supine>sit:  Assistance Level: Moderate assistance, Requires x 2 assistance  Skilled Clinical Factors: mod A of 2  Transfers:     Sit>stand:  Assistance Level: Moderate assistance, Requires x 2 assistance  Skilled Clinical Factors: sit to stand

## 2024-04-02 NOTE — PROGRESS NOTES
[facility]: ACUTE REHAB UNIT  SPEECH-LANGUAGE PATHOLOGY      [x] Daily  [] Weekly Care Conference Note  [] Discharge    Patient:Zana Voss      :1931  MRN:[id number]  Rehab Dx/Hx: Late effects of cerebral ischemic stroke [I69.30]    Precautions: falls and aspirations  Home situation: Home with grandson; retired; primary for medications, finances, and homemaking tasks PTA; active  PTA  ST Dx: [x] Aphasia  [x] Dysarthria  [] Apraxia   [x] Oropharyngeal dysphagia [] Cognitive Impairment  [] Other:   Date of Admit: 3/24/2024  Room #: 0151/0151-01    Current functional status (updated daily):         Pt being seen for : [x] Speech/Language Treatment  [x] Dysphagia Treatment [] Cognitive Treatment  [] Other:  Communication: []WFL  [x] Aphasia  [x] Dysarthria  [] Apraxia  [] Pragmatic Impairment [] Non-verbal  [] Hearing Loss  [] Other:   Cognition: [] WFL  [] Mild  [] Moderate  [] Severe [x] Unable to Assess d/t severity of aphasia  [] Other:  Memory: [] WFL  [] Mild  [] Moderate  [] Severe [x] Unable to Assess d/t severity of aphasia  [] Other:  Behavior: [x] Alert  [] Cooperative  []  Pleasant  [] Confused  [x] Agitated  [x] Uncooperative  [x] Distractible [] Motivated  [] Self-Limiting [] Anxious  [] Other:  Endurance:  [] Adequate for participation in SLP sessions  [x] Reduced overall  [x] Lethargic  [] Other:  Safety: [] No concerns at this time  [] Reduced insight into deficits  []  Reduced safety awareness [] Not following call light procedures  [x] Unable to Assess  [] Other:    Current Diet Order:ADULT DIET; Regular  ADULT ORAL NUTRITION SUPPLEMENT; Breakfast, Dinner, Lunch; Standard High Calorie/High Protein Oral Supplement  ADULT ORAL NUTRITION SUPPLEMENT; Lunch, Dinner; Frozen Oral Supplement  Swallowing Precautions: upright for all intake, stay upright for at least 30 mins after intake, small bites/sips, close supervision, oral care 2-3x/day to reduce adverse affects in the event of aspiration,    Speech-Language Pathologist

## 2024-04-02 NOTE — PROGRESS NOTES
Occupational Therapy  Facility/Department: [facility]  Rehabilitation Occupational Therapy Daily Treatment Note    Date: 24  Patient Name: Zana Voss       Room: 0151/0151-01  MRN: [id number]  Account: [id number]   : 1931  (89 y.o.) Gender: male                    Past Medical History:  has a past medical history of COVID, GERD with esophagitis, Hearing loss, [place] (hard of hearing), Hyperlipidemia, Hypertension, Melanoma (HCC), TIA (transient ischemic attack), and Wears dentures.  Past Surgical History:   has a past surgical history that includes eye surgery; hip surgery (Right); Femur Surgery (Left); other surgical history (Right, 2017); Upper gastrointestinal endoscopy (N/A, 2022); Colonoscopy (N/A, 2022); and Colonoscopy (N/A, 2022).    Restrictions  Restrictions/Precautions: Fall Risk, Up as Tolerated, General Precautions  Other position/activity restrictions: up with assistance, R weakness, expressive/receptive aphasia  Required Braces or Orthoses?: No    Subjective  Subjective: Pt in recliner, more fatigued this date, agreeable to OT/PT session with encouragement, no s/s of pain at rest, grimacing with movement of R shoulder this date, /67, HR 57, O2 sats 94% on RA.  Restrictions/Precautions: Fall Risk;Up as Tolerated;General Precautions             Objective     Cognition  Overall Cognitive Status: Exceptions  Arousal/Alertness: Delayed responses to stimuli  Following Commands: Inconsistently follows commands  Attention Span: Attends with cues to redirect;Difficulty attending to directions;Difficulty dividing attention  Memory: Decreased recall of biographical Information;Decreased recall of precautions;Decreased recall of recent events;Decreased short term memory  Safety Judgement: Decreased awareness of need for assistance;Decreased awareness of need for safety  Problem Solving: Assistance required to generate solutions;Assistance required to identify errors  Therapy;Plan of Care;Precautions;Safety;Transfer Training;Equipment;ADL Function;Mobility Training  Education Provided Comments: role of OT, safety with transfers, midline posture, reaching out of MICHAEL, standing posture, weight shifting  Education Method: Demonstration;Verbal  Barriers to Learning: Cognition;Hearing  Education Outcome: Unable to verbalize;Unable to demonstrate understanding;Continued education needed    Plan  Occupational Therapy Plan  Times Per Week: 5-7x/wk  Current Treatment Recommendations: Strengthening;ROM;Balance training;Functional mobility training;Endurance training;Wheelchair mobility training;Neuromuscular re-education;Cognitive reorientation;Safety education & training;Patient/Caregiver education & training;Equipment evaluation, education, & procurement;Modalities;Positioning;Self-Care / ADL;Home management training;Cognitive/Perceptual training;Coordination training;Co-Treatment;Sensory integration    Goals  Short Term Goals  Time Frame for Short Term Goals: 10 days (4/2/24)  Short Term Goal 1: Pt will perform toilet transfer with modA and LRAD. progressing 4/2 total assist  Short Term Goal 2: Pt will perform TB dressing using domitila-dressing techniques with modA and AE PRN. progressing 4/2 total assist  Short Term Goal 3: Pt will perform TB bathing using domitila-techniques with modA and AE PRN. progressing 4/2 total assist  Short Term Goal 4: Pt will tolerate standing for ~3 min with modAx2 for balance and using LRAD to increase endurance for standing ADLs and functional mobility. progressing 4/2 max A of 2  Short Term Goal 5: Pt will tolerate x10 reps of AAROM on RUE to increase strength for ADL tasks and functional transfers. progressing 4/2 poor attention to R side  Long Term Goals  Time Frame for Long Term Goals : 21 days (4/13/24)  Long Term Goal 1: Pt will perform toilet transfer with CGA and LRAD  Long Term Goal 2: Pt will perform TB dressing using domitila-dressing techniques with SBA

## 2024-04-02 NOTE — PATIENT CARE CONFERENCE
[facility]  Inpatient Rehabilitation  Weekly Team Conference Note    Date: 4/3/2024  Patient Name: Zana Voss        MRN: [id number]    : 1931  (89 y.o.)  Gender: male   Referring Practitioner: Anai Gutierrez MD  Diagnosis: late effects of cerebral ischemic stroke      Interventions to be utilized toward barriers to discharge, per discipline:  NURSING  Nursing observed barriers to dc: Cognitive deficit, Limited participation, Limited insight into deficits, Communication deficit, Upper extremity weakness, Lower extremity weakness, Incontinence of bowel, and Incontinence of bladder  Nursing interventions: Assist with ADLs, medication management, maintain skin integrity  Family Education: No  Fall Risk:  Yes    Stay with me?: Yes    PHYSICAL THERAPY  Physical therapy observed barriers to dc:          Baseline: lives with grandson in 1 level home with level entry. Ind with no AD for mobility              Current level:max A -TD bed mobility, TD transfers via STEDY, TD gait in // bars 2-5 ft with TD. Max A wc mobility               Barriers to DC: R domitila, aphasia, burden of care              Needs in order to achieve dc home/next level of care:anticipate pt will require some level of physical assist, ideally min A-CGA for mobility needs. At this time, anticipate pt will require extensive care upon d/c, rec SNF vs LTC      Physical therapy interventions:   Current Treatment Recommendations: Strengthening, ROM, Balance training, Endurance training, Functional mobility training, Gait training, Patient/Caregiver education & training, Neuromuscular re-education, Equipment evaluation, education, & procurement, Therapeutic activities, Co-Treatment, Home exercise program, Safety education & training    PT Goals:            Short Term Goals  Time Frame for Short Term Goals:   Short Term Goal 1: pt will complete STS to platform walker with MAX A x2- GOAL MET, stands with mod A fo 2 to

## 2024-04-03 PROCEDURE — 97530 THERAPEUTIC ACTIVITIES: CPT

## 2024-04-03 PROCEDURE — 97542 WHEELCHAIR MNGMENT TRAINING: CPT

## 2024-04-03 PROCEDURE — 6370000000 HC RX 637 (ALT 250 FOR IP): Performed by: STUDENT IN AN ORGANIZED HEALTH CARE EDUCATION/TRAINING PROGRAM

## 2024-04-03 PROCEDURE — 2580000003 HC RX 258: Performed by: STUDENT IN AN ORGANIZED HEALTH CARE EDUCATION/TRAINING PROGRAM

## 2024-04-03 PROCEDURE — 97535 SELF CARE MNGMENT TRAINING: CPT

## 2024-04-03 PROCEDURE — 97112 NEUROMUSCULAR REEDUCATION: CPT

## 2024-04-03 PROCEDURE — 92526 ORAL FUNCTION THERAPY: CPT

## 2024-04-03 PROCEDURE — 92507 TX SP LANG VOICE COMM INDIV: CPT

## 2024-04-03 PROCEDURE — 6360000002 HC RX W HCPCS: Performed by: STUDENT IN AN ORGANIZED HEALTH CARE EDUCATION/TRAINING PROGRAM

## 2024-04-03 PROCEDURE — 1280000000 HC REHAB R&B

## 2024-04-03 RX ORDER — SODIUM CHLORIDE 9 MG/ML
INJECTION, SOLUTION INTRAVENOUS CONTINUOUS
Status: DISCONTINUED | OUTPATIENT
Start: 2024-04-03 | End: 2024-04-04

## 2024-04-03 RX ADMIN — ASPIRIN 81 MG 81 MG: 81 TABLET ORAL at 07:59

## 2024-04-03 RX ADMIN — ENOXAPARIN SODIUM 40 MG: 100 INJECTION SUBCUTANEOUS at 07:59

## 2024-04-03 RX ADMIN — DORZOLAMIDE HYDROCHLORIDE AND TIMOLOL MALEATE 1 DROP: 20; 5 SOLUTION/ DROPS OPHTHALMIC at 21:11

## 2024-04-03 RX ADMIN — SODIUM CHLORIDE: 9 INJECTION, SOLUTION INTRAVENOUS at 13:11

## 2024-04-03 RX ADMIN — PANTOPRAZOLE SODIUM 40 MG: 40 TABLET, DELAYED RELEASE ORAL at 06:36

## 2024-04-03 RX ADMIN — NYSTATIN 500000 UNITS: 100000 SUSPENSION ORAL at 12:35

## 2024-04-03 RX ADMIN — NIFEDIPINE 90 MG: 30 TABLET, FILM COATED, EXTENDED RELEASE ORAL at 07:59

## 2024-04-03 RX ADMIN — NYSTATIN 500000 UNITS: 100000 SUSPENSION ORAL at 21:01

## 2024-04-03 RX ADMIN — LATANOPROST 1 DROP: 50 SOLUTION OPHTHALMIC at 21:11

## 2024-04-03 RX ADMIN — MEGESTROL ACETATE 20 MG: 20 TABLET ORAL at 07:59

## 2024-04-03 RX ADMIN — CASTOR OIL AND BALSAM, PERU: 788; 87 OINTMENT TOPICAL at 07:58

## 2024-04-03 RX ADMIN — NYSTATIN 500000 UNITS: 100000 SUSPENSION ORAL at 07:58

## 2024-04-03 RX ADMIN — CLOPIDOGREL BISULFATE 75 MG: 75 TABLET ORAL at 07:59

## 2024-04-03 RX ADMIN — NYSTATIN 500000 UNITS: 100000 SUSPENSION ORAL at 16:21

## 2024-04-03 RX ADMIN — CASTOR OIL AND BALSAM, PERU: 788; 87 OINTMENT TOPICAL at 21:02

## 2024-04-03 RX ADMIN — ACETAMINOPHEN 650 MG: 325 TABLET ORAL at 21:01

## 2024-04-03 RX ADMIN — ATORVASTATIN CALCIUM 80 MG: 80 TABLET, FILM COATED ORAL at 21:01

## 2024-04-03 RX ADMIN — TAMSULOSIN HYDROCHLORIDE 0.4 MG: 0.4 CAPSULE ORAL at 07:59

## 2024-04-03 ASSESSMENT — PAIN SCALES - GENERAL: PAINLEVEL_OUTOF10: 5

## 2024-04-03 ASSESSMENT — PAIN DESCRIPTION - ORIENTATION: ORIENTATION: OTHER (COMMENT)

## 2024-04-03 ASSESSMENT — PAIN - FUNCTIONAL ASSESSMENT: PAIN_FUNCTIONAL_ASSESSMENT: ACTIVITIES ARE NOT PREVENTED

## 2024-04-03 ASSESSMENT — PAIN DESCRIPTION - LOCATION: LOCATION: GENERALIZED

## 2024-04-03 ASSESSMENT — PAIN DESCRIPTION - FREQUENCY: FREQUENCY: INTERMITTENT

## 2024-04-03 ASSESSMENT — PAIN DESCRIPTION - DESCRIPTORS: DESCRIPTORS: ACHING

## 2024-04-03 NOTE — PROGRESS NOTES
PALLIATIVE MEDICINE PROGRESS NOTE     Patient name:Zana Voss    MRN:[id number] :1931  Room/Bed:0151/0151-01    LOS: 10 days        ASSESSMENT/RECOMMENDATIONS     89 y.o. male with recent left MCA CVA.  Admitted to ARU 3/24/24 for functional deficits below his baseline.     Symptom management     1) Goals of care: Strongly considering comfort care at this time since patient not progressing well in ARU and would not want SNF or other aggressive care.  Family agreeable to hospice consult (sent to [facility]).  Hospice to meet with family 24. Code status remains Limited x 4 Nos.  Will continue to follow.    2) Debility - due to acute CVA with right hemiparesis and aphasia. Admitted to ARU 3/25/24. Not making consistent progress in rehab as initially hoped.  Has low participation, having difficulty following directions, poor awareness and is still Ax2.  ARU is recommending SNF at this time due to his deficits and needs.   Family does not believe patient will be readily accepting of SNF placement.           Patient/Family Goals of Care :    4/3/24    Spoke with ARU CM.  Team discussed patient today and there is concern patient is declining/not progressing.  Recommendations are for SNF, but team aware patient may not be agreeable to SNF.    Called Ronan EDWARDS.  Updated him on the ARU concerns.  He states he shares the same concerns.  He states he thinks that his dad is probably trying to tell us what he wants by his behaviors in the ARU.  Although he knows the recommendation is for SNF, he says patient will never tolerate it.    We discussed next option to be comfort care.  Reminded him of the discussion we had previously about hospice.  Son was emotional and says he thinks hospice back at patient's home is probably the right next step.  He is agreeable to a referral to [facility].  He is agreeable to discussion with hospice tomorrow and then follow up discussion with palliative care as needed

## 2024-04-03 NOTE — CARE COORDINATION
JOHNSON spoke to Lianna (daughter) via telephone offering information about stroke class that is held every other Thursday and the next date is 04/11/24. Lianna would like information emailed to her at [email or web address]. JOHNSON acknowledged.    Rossana Su RN

## 2024-04-03 NOTE — PLAN OF CARE
Problem: Discharge Planning  Goal: Discharge to home or other facility with appropriate resources  Outcome: Progressing     Problem: Pain  Goal: Verbalizes/displays adequate comfort level or baseline comfort level  Outcome: Progressing     Problem: Safety - Adult  Goal: Free from fall injury  Outcome: Progressing     Problem: ABCDS Injury Assessment  Goal: Absence of physical injury  Outcome: Progressing     Problem: Skin/Tissue Integrity  Goal: Absence of new skin breakdown  Description: 1.  Monitor for areas of redness and/or skin breakdown  2.  Assess vascular access sites hourly  3.  Every 4-6 hours minimum:  Change oxygen saturation probe site  4.  Every 4-6 hours:  If on nasal continuous positive airway pressure, respiratory therapy assess nares and determine need for appliance change or resting period.  Outcome: Progressing     Problem: Nutrition Deficit:  Goal: Optimize nutritional status  Outcome: Progressing     Problem: Neurosensory - Adult  Goal: Achieves stable or improved neurological status  Outcome: Progressing     Problem: Respiratory - Adult  Goal: Achieves optimal ventilation and oxygenation  Outcome: Progressing     Problem: Cardiovascular - Adult  Goal: Maintains optimal cardiac output and hemodynamic stability  Outcome: Progressing     Problem: Skin/Tissue Integrity - Adult  Goal: Skin integrity remains intact  Outcome: Progressing     Problem: Musculoskeletal - Adult  Goal: Return mobility to safest level of function  Outcome: Progressing     Problem: Gastrointestinal - Adult  Goal: Minimal or absence of nausea and vomiting  Outcome: Progressing     Problem: Genitourinary - Adult  Goal: Absence of urinary retention  Outcome: Progressing     Problem: Anxiety  Goal: Will report anxiety at manageable levels  Description: INTERVENTIONS:  1. Administer medication as ordered  2. Teach and rehearse alternative coping skills  3. Provide emotional support with 1:1 interaction with staff  Outcome:  Progressing     Problem: Coping  Goal: Pt/Family able to verbalize concerns and demonstrate effective coping strategies  Description: INTERVENTIONS:  1. Assist patient/family to identify coping skills, available support systems and cultural and spiritual values  2. Provide emotional support, including active listening and acknowledgement of concerns of patient and caregivers  3. Reduce environmental stimuli, as able  4. Instruct patient/family in relaxation techniques, as appropriate  5. Assess for spiritual pain/suffering and initiate Spiritual Care, Psychosocial Clinical Specialist consults as needed  Outcome: Progressing     Problem: Confusion  Goal: Confusion, delirium, dementia, or psychosis is improved or at baseline  Description: INTERVENTIONS:  1. Assess for possible contributors to thought disturbance, including medications, impaired vision or hearing, underlying metabolic abnormalities, dehydration, psychiatric diagnoses, and notify attending LIP  2. [place] high risk fall precautions, as indicated  3. Provide frequent short contacts to provide reality reorientation, refocusing and direction  4. Decrease environmental stimuli, including noise as appropriate  5. Monitor and intervene to maintain adequate nutrition, hydration, elimination, sleep and activity  6. If unable to ensure safety without constant attention obtain sitter and review sitter guidelines with assigned personnel  7. Initiate Psychosocial CNS and Spiritual Care consult, as indicated  Outcome: Progressing     Problem: Chronic Conditions and Co-morbidities  Goal: Patient's chronic conditions and co-morbidity symptoms are monitored and maintained or improved  Outcome: Progressing

## 2024-04-03 NOTE — PROGRESS NOTES
Zana Voss  4/3/2024  [id number]    Chief Complaint: Late effects of cerebral ischemic stroke    Subjective:   No acute events overnight. Today Zana is seen in his room with Speech present. History is limited from patient due to aphasia. He appears comfortable and does not demonstrate any pain behaviors. Encouraged patient to eat and drink. Unclear how much patient is able to understand due to aphasia. Explained to patient that he will not be able to progress with therapies if he does not maintain his nutrition. Did have discussion with his son earlier this week about my concerns. Did not eat breakfast per Speech. Per dietician has had poor PO intake for last 9 days. He is regressing in therapies. Concerns that patient will not survive if he does not increase his oral intake. If aggressive care is desired per patient/family preferences, wound recommend feeding tube. Case management to discuss teams concerns with Palliative.     ROS: unable to reliably obtain    Objective:  Patient Vitals for the past 24 hrs:   BP Temp Temp src Pulse Resp SpO2   04/03/24 1127 118/61 -- -- 51 -- 94 %   04/03/24 0726 (!) 116/54 97.9 °F (36.6 °C) Oral 53 14 96 %   04/02/24 2111 (!) 147/65 97.5 °F (36.4 °C) Oral 63 16 93 %     Gen: No distress, seated up in bed  HEENT: Normocephalic, atraumatic.   CV: extremities well perfused  Resp: No respiratory distress. On room air  Abd: Soft, nondistended  Ext: No edema.   Neuro: Alert, aphasic    Wt Readings from Last 3 Encounters:   03/27/24 82.5 kg (181 lb 14.1 oz)   03/21/24 95.3 kg (210 lb)   08/03/23 95.3 kg (210 lb)       Laboratory data:   Lab Results   Component Value Date    WBC 8.5 04/01/2024    HGB 13.8 04/01/2024    HCT 41.4 04/01/2024    MCV 88.0 04/01/2024     04/01/2024       Lab Results   Component Value Date/Time     04/02/2024 06:26 AM    K 3.8 04/02/2024 06:26 AM     04/02/2024 06:26 AM    CO2 25 04/02/2024 06:26 AM    BUN 44 04/02/2024 06:26 AM     continue LE motion for 2-3 strokes with tactile cues removed, then stops. pt unable to utilize LLE for propulsion/steering assistance (minimal improvement of LLE movement, however not functional to complete and turn/forward progressioN)    Occupational therapy:   Feeding  Assistance Level: Minimal assistance  Skilled Clinical Factors: to drink from Ensure bottle, min A to catch spillage out of R side of mouth  Grooming/Oral Hygiene  Assistance Level: Dependent  Skilled Clinical Factors: seated on paddles of [facility] for shaving, total assist for shaving, washing face, and rinsing mouth with swab stick  UE Bathing  Assistance Level: Dependent  Skilled Clinical Factors: total assist with attempt at [facility], minimal effort from pt to participate  LE Bathing  Assistance Level: Dependent  Skilled Clinical Factors: total assist, poor participation  UE Dressing  Assistance Level: Dependent  Skilled Clinical Factors: to change gown, poor attention to R side  LE Dressing  Assistance Level: Dependent  Skilled Clinical Factors: to change brief, total assist for task  Putting On/Taking Off Footwear  Assistance Level: Dependent  Skilled Clinical Factors: to don socks  Toileting  Assistance Level: Dependent  Skilled Clinical Factors: total assist to complete hygiene while standing at [facility]    Speech therapy:    ADULT DIET; Regular  ADULT ORAL NUTRITION SUPPLEMENT; Breakfast, Dinner, Lunch; Standard High Calorie/High Protein Oral Supplement  ADULT ORAL NUTRITION SUPPLEMENT; Lunch, Dinner; Frozen Oral Supplement    Body mass index is 28.49 kg/m².    Assessment and Plan:  Zana Voss is a 89 year old male with a past medical history significant for TIA, HTN, HLD, and obesity who presented to [facility] ED with right sided weakness and aphasia, found to have left MCA CVA. He was admitted to [facility] on 3/24 due to functional deficits below his baseline.      Left MCA CVA  - left MCA occlusion  - with right hemiparesis, aphasia  -

## 2024-04-03 NOTE — PROGRESS NOTES
Physical Therapy  Facility/Department: [facility]  Rehabilitation Physical Therapy Treatment Note    NAME: Zana Voss  : 1931 (89 y.o.)  MRN: [id number]  CODE STATUS: Limited    Date of Service: 4/3/24       Restrictions:  Restrictions/Precautions: Fall Risk, Up as Tolerated, General Precautions  Position Activity Restriction  Other position/activity restrictions: up with assistance, R weakness, expressive/receptive aphasia     SUBJECTIVE  Subjective  Subjective: pt found in bed  Pain: indicates pain with RUE and LE movement, unable to rate       OBJECTIVE  Cognition  Overall Cognitive Status: Exceptions  Arousal/Alertness: Delayed responses to stimuli  Following Commands: Inconsistently follows commands  Attention Span: Attends with cues to redirect;Difficulty attending to directions;Difficulty dividing attention  Memory: Decreased recall of biographical Information;Decreased recall of precautions;Decreased recall of recent events;Decreased short term memory  Safety Judgement: Decreased awareness of need for assistance;Decreased awareness of need for safety  Problem Solving: Assistance required to generate solutions;Assistance required to identify errors made;Assistance required to correct errors made;Assistance required to implement solutions;Decreased awareness of errors  Insights: Decreased awareness of deficits  Initiation: Requires cues for all  Sequencing: Requires cues for all  Cognition Comment: limited by aphasia  Orientation  Overall Orientation Status: Impaired  Orientation Level: Unable to assess    Functional Mobility  Sit to Supine  Assistance Level: Moderate assistance;Requires x 2 assistance  Supine to Sit  Assistance Level: Moderate assistance;Requires x 2 assistance  Balance  Standing Balance: Dependent/Total  Sit to Stand  Assistance Level: Moderate assistance;Requires x 2 assistance  Skilled Clinical Factors: sit to stand from EOb to STEDy, commode to sTEDY with mod A of 1 + min A of

## 2024-04-03 NOTE — PROGRESS NOTES
Occupational Therapy  Facility/Department: [facility]  Rehabilitation Occupational Therapy Daily Treatment Note    Date: 4/3/24  Patient Name: Zana Voss       Room: 0151/0151-01  MRN: [id number]  Account: [id number]   : 1931  (89 y.o.) Gender: male                    Past Medical History:  has a past medical history of COVID, GERD with esophagitis, Hearing loss, [place] (hard of hearing), Hyperlipidemia, Hypertension, Melanoma (HCC), TIA (transient ischemic attack), and Wears dentures.  Past Surgical History:   has a past surgical history that includes eye surgery; hip surgery (Right); Femur Surgery (Left); other surgical history (Right, 2017); Upper gastrointestinal endoscopy (N/A, 2022); Colonoscopy (N/A, 2022); and Colonoscopy (N/A, 2022).    Restrictions  Restrictions/Precautions: Fall Risk, Up as Tolerated, General Precautions  Other position/activity restrictions: up with assistance, R weakness, expressive/receptive aphasia  Required Braces or Orthoses?: No    Subjective  Subjective: Pt in bed, resistant initially to therapy, agreeable to OT/PT session with increased time and encouragement, grimacing at times but no consistent s/s of pain, /61, HR 51, O2 sats 96% on RA.  Restrictions/Precautions: Fall Risk;Up as Tolerated;General Precautions             Objective     Cognition  Overall Cognitive Status: Exceptions  Arousal/Alertness: Delayed responses to stimuli  Following Commands: Inconsistently follows commands  Attention Span: Attends with cues to redirect;Difficulty attending to directions;Difficulty dividing attention  Memory: Decreased recall of biographical Information;Decreased recall of precautions;Decreased recall of recent events;Decreased short term memory  Safety Judgement: Decreased awareness of need for assistance;Decreased awareness of need for safety  Problem Solving: Assistance required to generate solutions;Assistance required to identify errors

## 2024-04-03 NOTE — PROGRESS NOTES
[facility]: ACUTE REHAB UNIT  SPEECH-LANGUAGE PATHOLOGY      [x] Daily  [] Weekly Care Conference Note  [] Discharge    Patient:Zana Voss      :1931  MRN:[id number]  Rehab Dx/Hx: Late effects of cerebral ischemic stroke [I69.30]    Precautions: falls and aspirations  Home situation: Home with grandson; retired; primary for medications, finances, and homemaking tasks PTA; active  PTA  ST Dx: [x] Aphasia  [x] Dysarthria  [] Apraxia   [x] Oropharyngeal dysphagia [] Cognitive Impairment  [] Other:   Date of Admit: 3/24/2024  Room #: 0151/0151-01    Current functional status (updated daily):         Pt being seen for : [x] Speech/Language Treatment  [x] Dysphagia Treatment [] Cognitive Treatment  [] Other:  Communication: []WFL  [x] Aphasia  [x] Dysarthria  [] Apraxia  [] Pragmatic Impairment [] Non-verbal  [] Hearing Loss  [] Other:   Cognition: [] WFL  [] Mild  [] Moderate  [] Severe [x] Unable to Assess d/t severity of aphasia  [] Other:  Memory: [] WFL  [] Mild  [] Moderate  [] Severe [x] Unable to Assess d/t severity of aphasia  [] Other:  Behavior: [x] Alert  [] Cooperative  []  Pleasant  [] Confused  [x] Agitated  [x] Uncooperative  [x] Distractible [] Motivated  [] Self-Limiting [] Anxious  [] Other:  Endurance:  [] Adequate for participation in SLP sessions  [x] Reduced overall  [x] Lethargic  [] Other:  Safety: [] No concerns at this time  [] Reduced insight into deficits  []  Reduced safety awareness [] Not following call light procedures  [x] Unable to Assess  [] Other:    Current Diet Order:ADULT DIET; Regular  ADULT ORAL NUTRITION SUPPLEMENT; Breakfast, Dinner, Lunch; Standard High Calorie/High Protein Oral Supplement  ADULT ORAL NUTRITION SUPPLEMENT; Lunch, Dinner; Frozen Oral Supplement  Swallowing Precautions: upright for all intake, stay upright for at least 30 mins after intake, small bites/sips, close supervision, oral care 2-3x/day to reduce adverse affects in the event of aspiration,  accepted.     Lunch tray present at bedside:  -pt refused all solid PO trials that were offered from lunch meal again this tx session.     Pt did not consume TL when offered by SLP. However, at one point, pt reached for the ensure drink and consumed several sips, tolerating without any immediate overt s/s of aspiration.     Pt did present with delayed coughing x3 though.     SLP attempted to place pt dentures, pt refused shaking his head no, gesturing hand no, and not opening his mouth.     RN stated that pt consumed a few bites of his magic cup prior to SLP arriving, but refused anything else.          Goal 2: The patient/caregiver will demonstrate understanding of compensatory swallow strategies, for improved swallow safety   Attempted education re: small sips, importance of PO intake. No evidence of learning.   Attempted education re: small sips, importance of PO intake. No evidence of learning.   Goal 3: The patient will tolerate instrumental assessment when able   Will continue to monitor for need for instrumental during admission.  Will continue to monitor for need for instrumental during admission.    Goal 4: The patient will complete oral motor exercises to improve labial and lingual strength / ROM / coordination 5/5 given mod cues    Unable to target. Pt is not following commands due to limited participation and hand gesturing/head nodding \"no\" for every single attempt throughout entire tx session.  Unable to target. Pt is not following commands due to limited participation and hand gesturing/head nodding \"no\" for every single attempt throughout entire tx session.    Speech / Language Goals:  Short-term Goals  Timeframe for Short-term Goals: (18 days; 04/10/24)    Goal 1: Pt will complete automatic speech tasks with 50% acc given mod cues   Name:  -attempted use of YARA, hand tapping, and carrier phrase  -SLP providing max cues with hand over hand with tapping  -pt with no attempts to vocalize       Temp > 100F or < 96.8F; SBP < 90 mmHG; HR > 120bpm; Resp > 24/min

## 2024-04-03 NOTE — CARE COORDINATION
JOHNSON spoke to KISHA Monroy NP Palliative ext [phone] about update team conference progress with patient. KISHA Monroy will call Ronan (son) via telephone with [facility] plan.    Rossana Su RN

## 2024-04-04 LAB
ANION GAP SERPL CALCULATED.3IONS-SCNC: 11 MMOL/L (ref 3–16)
BASOPHILS # BLD: 0.1 K/UL (ref 0–0.2)
BASOPHILS NFR BLD: 0.7 %
BUN SERPL-MCNC: 31 MG/DL (ref 7–20)
CALCIUM SERPL-MCNC: 8.9 MG/DL (ref 8.3–10.6)
CHLORIDE SERPL-SCNC: 106 MMOL/L (ref 99–110)
CO2 SERPL-SCNC: 24 MMOL/L (ref 21–32)
CREAT SERPL-MCNC: 0.8 MG/DL (ref 0.8–1.3)
DEPRECATED RDW RBC AUTO: 15.8 % (ref 12.4–15.4)
EOSINOPHIL # BLD: 0.2 K/UL (ref 0–0.6)
EOSINOPHIL NFR BLD: 2.3 %
GFR SERPLBLD CREATININE-BSD FMLA CKD-EPI: 83 ML/MIN/{1.73_M2}
GLUCOSE SERPL-MCNC: 102 MG/DL (ref 70–99)
HCT VFR BLD AUTO: 44.9 % (ref 40.5–52.5)
HGB BLD-MCNC: 14.5 G/DL (ref 13.5–17.5)
LYMPHOCYTES # BLD: 1.8 K/UL (ref 1–5.1)
LYMPHOCYTES NFR BLD: 19.1 %
MCH RBC QN AUTO: 28.5 PG (ref 26–34)
MCHC RBC AUTO-ENTMCNC: 32.4 G/DL (ref 31–36)
MCV RBC AUTO: 88.2 FL (ref 80–100)
MONOCYTES # BLD: 0.8 K/UL (ref 0–1.3)
MONOCYTES NFR BLD: 8.7 %
NEUTROPHILS # BLD: 6.4 K/UL (ref 1.7–7.7)
NEUTROPHILS NFR BLD: 69.2 %
PLATELET # BLD AUTO: 276 K/UL (ref 135–450)
PMV BLD AUTO: 7.8 FL (ref 5–10.5)
POTASSIUM SERPL-SCNC: 3.7 MMOL/L (ref 3.5–5.1)
RBC # BLD AUTO: 5.09 M/UL (ref 4.2–5.9)
REASON FOR REJECTION: NORMAL
REJECTED TEST: NORMAL
SODIUM SERPL-SCNC: 141 MMOL/L (ref 136–145)
WBC # BLD AUTO: 9.2 K/UL (ref 4–11)

## 2024-04-04 PROCEDURE — 36415 COLL VENOUS BLD VENIPUNCTURE: CPT

## 2024-04-04 PROCEDURE — 92526 ORAL FUNCTION THERAPY: CPT

## 2024-04-04 PROCEDURE — 6370000000 HC RX 637 (ALT 250 FOR IP): Performed by: STUDENT IN AN ORGANIZED HEALTH CARE EDUCATION/TRAINING PROGRAM

## 2024-04-04 PROCEDURE — 97530 THERAPEUTIC ACTIVITIES: CPT

## 2024-04-04 PROCEDURE — 97535 SELF CARE MNGMENT TRAINING: CPT

## 2024-04-04 PROCEDURE — 85025 COMPLETE CBC W/AUTO DIFF WBC: CPT

## 2024-04-04 PROCEDURE — 6360000002 HC RX W HCPCS: Performed by: STUDENT IN AN ORGANIZED HEALTH CARE EDUCATION/TRAINING PROGRAM

## 2024-04-04 PROCEDURE — 2580000003 HC RX 258: Performed by: STUDENT IN AN ORGANIZED HEALTH CARE EDUCATION/TRAINING PROGRAM

## 2024-04-04 PROCEDURE — 1280000000 HC REHAB R&B

## 2024-04-04 PROCEDURE — 80048 BASIC METABOLIC PNL TOTAL CA: CPT

## 2024-04-04 PROCEDURE — 92507 TX SP LANG VOICE COMM INDIV: CPT

## 2024-04-04 RX ADMIN — MEGESTROL ACETATE 20 MG: 20 TABLET ORAL at 07:55

## 2024-04-04 RX ADMIN — Medication 5 MG: at 20:03

## 2024-04-04 RX ADMIN — LATANOPROST 1 DROP: 50 SOLUTION OPHTHALMIC at 20:08

## 2024-04-04 RX ADMIN — TAMSULOSIN HYDROCHLORIDE 0.4 MG: 0.4 CAPSULE ORAL at 07:55

## 2024-04-04 RX ADMIN — NYSTATIN 500000 UNITS: 100000 SUSPENSION ORAL at 07:55

## 2024-04-04 RX ADMIN — NIFEDIPINE 90 MG: 30 TABLET, FILM COATED, EXTENDED RELEASE ORAL at 07:55

## 2024-04-04 RX ADMIN — NYSTATIN 500000 UNITS: 100000 SUSPENSION ORAL at 20:03

## 2024-04-04 RX ADMIN — ATORVASTATIN CALCIUM 80 MG: 80 TABLET, FILM COATED ORAL at 20:03

## 2024-04-04 RX ADMIN — CASTOR OIL AND BALSAM, PERU: 788; 87 OINTMENT TOPICAL at 20:03

## 2024-04-04 RX ADMIN — ACETAMINOPHEN 650 MG: 325 TABLET ORAL at 20:03

## 2024-04-04 RX ADMIN — NYSTATIN 500000 UNITS: 100000 SUSPENSION ORAL at 17:03

## 2024-04-04 RX ADMIN — CLOPIDOGREL BISULFATE 75 MG: 75 TABLET ORAL at 07:55

## 2024-04-04 RX ADMIN — CASTOR OIL AND BALSAM, PERU: 788; 87 OINTMENT TOPICAL at 07:55

## 2024-04-04 RX ADMIN — ENOXAPARIN SODIUM 40 MG: 100 INJECTION SUBCUTANEOUS at 07:55

## 2024-04-04 RX ADMIN — ACETAMINOPHEN 650 MG: 325 TABLET ORAL at 09:18

## 2024-04-04 RX ADMIN — ASPIRIN 81 MG 81 MG: 81 TABLET ORAL at 07:55

## 2024-04-04 RX ADMIN — DORZOLAMIDE HYDROCHLORIDE AND TIMOLOL MALEATE 1 DROP: 20; 5 SOLUTION/ DROPS OPHTHALMIC at 20:08

## 2024-04-04 RX ADMIN — SODIUM CHLORIDE: 9 INJECTION, SOLUTION INTRAVENOUS at 02:10

## 2024-04-04 ASSESSMENT — PAIN SCALES - GENERAL: PAINLEVEL_OUTOF10: 5

## 2024-04-04 ASSESSMENT — PAIN DESCRIPTION - ORIENTATION
ORIENTATION: RIGHT
ORIENTATION: RIGHT

## 2024-04-04 ASSESSMENT — PAIN DESCRIPTION - DESCRIPTORS: DESCRIPTORS: ACHING

## 2024-04-04 ASSESSMENT — PAIN DESCRIPTION - LOCATION
LOCATION: ARM
LOCATION: ARM

## 2024-04-04 ASSESSMENT — PAIN - FUNCTIONAL ASSESSMENT
PAIN_FUNCTIONAL_ASSESSMENT: ACTIVITIES ARE NOT PREVENTED
PAIN_FUNCTIONAL_ASSESSMENT: PREVENTS OR INTERFERES WITH ALL ACTIVE AND SOME PASSIVE ACTIVITIES

## 2024-04-04 ASSESSMENT — PAIN DESCRIPTION - FREQUENCY: FREQUENCY: CONTINUOUS

## 2024-04-04 ASSESSMENT — PAIN DESCRIPTION - ONSET: ONSET: ON-GOING

## 2024-04-04 NOTE — PLAN OF CARE
At risk for skin breakdown, see Gunnar scale. Unable to repositin self in bed. Turn  and reposition every 2 hours. Heels elevated off bed. Protective barrier placed as needed. Patient kept clean and dry. Pillows used for positioning. Will continue to monitor for skin breakdown.

## 2024-04-04 NOTE — PROGRESS NOTES
Comprehensive Nutrition Assessment    Type and Reason for Visit:  Reassess    Nutrition Recommendations/Plan:   Continue regular diet as desired  Continue Ensure and Magic Cup ONS  If nutrition intervention is required, please submit a dietary consult.     Malnutrition Assessment:  Malnutrition Status:  At risk for malnutrition (Comment) (03/25/24 1514)      Nutrition Assessment:    Follow up: Pt declining from a nutirtion standpoint AEB continued poor po intakes. Currently on a regular diet with Ensure ONS TID and Magic Cup BID. PO intakes 0-25% per EMR. Family had meeting with Hospice today and has decided to utilize Hospice. D/t Hospice status, dietitian will sign off. Please consult RD if further nutrition intervention is required.    Nutrition Related Findings:    BM x 1 on 3/29. Labs reviewed. Wound Type: None (Abrasion rt knee)       Current Nutrition Intake & Therapies:    Average Meal Intake: Refusing to eat, 0%, 1-25%  Average Supplements Intake: Refusing to take, 0%, 1-25%  ADULT DIET; Regular  ADULT ORAL NUTRITION SUPPLEMENT; Breakfast, Dinner, Lunch; Standard High Calorie/High Protein Oral Supplement  ADULT ORAL NUTRITION SUPPLEMENT; Lunch, Dinner; Frozen Oral Supplement    Anthropometric Measures:  Height: 170.2 cm (5' 7\")  Ideal Body Weight (IBW): 148 lbs (67 kg)       Current Body Weight: 82.5 kg (181 lb 14.1 oz),   IBW. Weight Source: Bed Scale  Current BMI (kg/m2): 28.5                          BMI Categories: Overweight (BMI 25.0-29.9)    Estimated Daily Nutrient Needs:  Energy Requirements Based On: Kcal/kg  Weight Used for Energy Requirements: Ideal  Energy (kcal/day): 5881-2612  Weight Used for Protein Requirements: Ideal  Protein (g/day): 67-81  Method Used for Fluid Requirements: 1 ml/kcal  Fluid (ml/day): 5663-4273    Nutrition Diagnosis:   Inadequate oral intake related to inadequate protein-energy intake as evidenced by poor intake prior to admission    Nutrition Interventions:   Food  and/or Nutrient Delivery: Continue Current Diet, Continue Oral Nutrition Supplement  Nutrition Education/Counseling: No recommendation at this time, Education declined  Coordination of Nutrition Care: Continue to monitor while inpatient       Goals:  Previous Goal Met: No Progress toward Goal(s)  Goals: PO intake 50% or greater, prior to discharge       Nutrition Monitoring and Evaluation:   Behavioral-Environmental Outcomes: None Identified  Food/Nutrient Intake Outcomes: Food and Nutrient Intake, Supplement Intake  Physical Signs/Symptoms Outcomes: Biochemical Data, Weight    Discharge Planning:    Continue current diet, Continue Oral Nutrition Supplement     Julisa Gaines RD, LD  Contact: [phone]

## 2024-04-04 NOTE — PROGRESS NOTES
Balance  Activity: pt standing in STEDY with mod A and BLe blocked before and after commode use for clothing management. pt completed static/dyn sitting balance EOB while completing ADL with initially mod A progressing to close SBA as pt corrected R lateral lean. pt with multiple Posterior minor LOB with CGA to correct.  Sit to Stand  Assistance Level: Moderate assistance  Skilled Clinical Factors: sit to stand EOB to STEDy, commode to STEDY and shower chair to STEDY with mod A of 1  Stand to Sit  Assistance Level: Maximum assistance  Stand Pivot  Assistance Level: Requires x 2 assistance;Dependent  Skilled Clinical Factors: via STEDY EOB to commode and w/c with mod A for trunk control in STEDY  Pt in bed at end of session with call light/needs within reach and alarm donned.    ASSESSMENT/PROGRESS TOWARDS GOALS  Vital Signs  Pulse: 54  Heart Rate Source: Monitor  BP: 128/71  BP Location: Left upper arm  MAP (Calculated): 90  SpO2: 96 %    Assessment  Assessment: pt seen as co treat with OT for increased safety progressing funcitonal mobility. pt family presnet, per family and RN, pt family deciding for pt to return home with hospice. pt requires TD for bed mobility, Td for transfers via STEDy, maxa for wc mobility and is not functionally ambulatory (Ax2 in // bars). DME: defer to hospice care  Activity Tolerance: Patient limited by fatigue;Patient limited by endurance;Treatment limited secondary to decreased cognition  Discharge Recommendations: Other (comment) (home with hospice)  PT Equipment Recommendations  Equipment Needed: Yes  Other: per hospice company, probable hospital bed, lift equipment, BSC    Goals  Patient Goals   Patient Goals : did not verbalize  Short Term Goals  Time Frame for Short Term Goals: 4/1  Short Term Goal 1: pt will complete STS to platform walker with MAX A x2- GOAL MET, stands with mod A fo 2 to HHA  Short Term Goal 2: pt will perform supine to sit with MOD A- GOAL NOT MET, mod A of  2  Short Term Goal 3: pt will perform bed to chair with stedy and MOD A- GOAL MET with STEDY and min a fo 1  Short Term Goal 4: pt will tolerate gait assessment if/when appropriate for LTG to be set- GOAL MET 3/28, pt ambulated 5 ft iwth HW and max A of 2  Short Term Goal 5: pt will propel WC 50 ft with domitila technique and MOD A- GOAL NOT MET, requires max a to TD  Long Term Goals  Time Frame for Long Term Goals : 4/13  Long Term Goal 1: pt will perform STS to platform walker with MOD A- GOAL NOT MET< mod A of 2 to STEDY  Long Term Goal 2: pt will transfer bed to chair with platform walker and MAX A- GOAL NOT MET< use of STEYD  Long Term Goal 3: pt will perform supine <> sit with MIN A- GOAL NOT MET, mod a of 2  Long Term Goal 4: pt will propel WC 50 ft with CGA- GOAL NOT MET, max a  Long Term Goal 5: gait goal to be set 3/29: pt will ambulate 10 ft with min A of 1 and LRAD.- GOAL NOT MET    PLAN OF CARE/SAFETY  Physical Therapy Plan  General Plan: 5-7 times per week  Current Treatment Recommendations: Strengthening;ROM;Balance training;Endurance training;Functional mobility training;Gait training;Patient/Caregiver education & training;Neuromuscular re-education;Equipment evaluation, education, & procurement;Therapeutic activities;Co-Treatment;Home exercise program;Safety education & training  Safety Devices  Type of Devices: All fall risk precautions in place;Call light within reach;Chair alarm in place;Patient at risk for falls;Heels elevated for pressure relief;Gait belt;Nurse notified;Left in bed;Bed alarm in place    EDUCATION  Education  Education Given To: Patient  Education Provided: Role of Therapy;Plan of Care;Precautions;Safety;Transfer Training;Cognition;Equipment;Fall Prevention Strategies;Home Exercise Program;Mobility Training  Education Provided Comments: role of PT< safe transfers/gait mechanics, body positioning in bed to family  Education Method: Verbal;Demonstration  Barriers to Learning:

## 2024-04-04 NOTE — PROGRESS NOTES
Family has chosen to go Hospice. Equipment is to be delivered this evening and plan is home in the am. Dr. Gutierrez notified via perfect serve.

## 2024-04-04 NOTE — PROGRESS NOTES
to monitor for need for instrumental during admission.  Will continue to monitor for need for instrumental during admission.  Goal not met - instrumental not completed during admission and pt to d/c home with hospice.   Goal 4: The patient will complete oral motor exercises to improve labial and lingual strength / ROM / coordination 5/5 given mod cues    Unable to target. Pt is not following commands due to limited participation and keeping eyes closed. Would shake head \"no\" when attempted to encourage pt for participation.  Unable to target. Pt is not following commands due to limited participation and hand gesturing/head nodding \"no\" for every single attempt throughout entire tx session.  Goal not met - pt unable to follow commands in order to complete OMEs. When targeted, pt with no attempts to complete. Pt to d/c home with hospice care.    Speech / Language Goals:  Short-term Goals  Timeframe for Short-term Goals: (18 days; 04/10/24)    Goal 1: Pt will complete automatic speech tasks with 50% acc given mod cues   Goal not directly targeted      Name:  -attempted use of hand tapping, YARA, carrier phrase   -no attempts to verbalize, only shaking head 'no'    Counting 1-5  -SLP provide verbal cues and visual cues  -pt shaking head no, grunting occ (appeared to be frustrated )  -no attempts otherwise     Singing Happy Birthday  -pt with no attempts to verbalize, only shaking head 'no'    Goal not met - pt with limited to no participation with automatic speech tasks and did not demonstrate improvement. Pt to d/c home with hospice care.    Goal 2: Pt will complete basic expressive language tasks (e.g. object naming, phrase completion) with 50% acc given mod cues   Gestures:  -pt continues to rely on gestures as primary form of communication of wants/needs  -pt also will put hand up to refuse something      Gestures:   -pt continues to rely on gesture for primary form of communication   -pt gestured for his bedside    Speech-Language Pathologist      Session 2 and discharge summary:  Soraida Nunez MA CCC-SLP #[id number]  Speech Language Pathologist

## 2024-04-04 NOTE — CARE COORDINATION
JOHNSON spoke to KISHA Monroy NP Palliative ext [phone] via telephone with updated GOC with son Ronan. They are going with [facility] and having a meeting today at 12:00pm. CM will awaiting update from Hospice Nurse.    Rossana Su RN

## 2024-04-04 NOTE — PROGRESS NOTES
Occupational Therapy  Facility/Department: [facility]  Rehabilitation Occupational Therapy Daily Treatment Note    Date: 24  Patient Name: Zana Voss       Room: 0151/0151-01  MRN: [id number]  Account: [id number]   : 1931  (89 y.o.) Gender: male                    Past Medical History:  has a past medical history of COVID, GERD with esophagitis, Hearing loss, [place] (hard of hearing), Hyperlipidemia, Hypertension, Melanoma (HCC), TIA (transient ischemic attack), and Wears dentures.  Past Surgical History:   has a past surgical history that includes eye surgery; hip surgery (Right); Femur Surgery (Left); other surgical history (Right, 2017); Upper gastrointestinal endoscopy (N/A, 2022); Colonoscopy (N/A, 2022); and Colonoscopy (N/A, 2022).    Restrictions  Restrictions/Precautions: Fall Risk, Up as Tolerated, General Precautions  Other position/activity restrictions: up with assistance, R weakness, expressive/receptive aphasia  Required Braces or Orthoses?: No    Subjective  Subjective: Pt in bed, very tired, awakens to voice, little participation, mildly resistant to movement but agreeable with encouragement, family present, /71, HR 52, O2 sats 96% on RA.  Restrictions/Precautions: Fall Risk;Up as Tolerated;General Precautions             Objective     Cognition  Overall Cognitive Status: Exceptions  Arousal/Alertness: Delayed responses to stimuli  Following Commands: Inconsistently follows commands  Attention Span: Attends with cues to redirect;Difficulty attending to directions;Difficulty dividing attention  Memory: Decreased recall of biographical Information;Decreased recall of precautions;Decreased recall of recent events;Decreased short term memory  Safety Judgement: Decreased awareness of need for assistance;Decreased awareness of need for safety  Problem Solving: Assistance required to generate solutions;Assistance required to identify errors made;Assistance required  reps of AAROM on RUE to increase strength for ADL tasks and functional transfers. Goal Not Met 4/4 total assist  Long Term Goals  Time Frame for Long Term Goals : 21 days (4/13/24)  Long Term Goal 1: Pt will perform toilet transfer with CGA and LRAD. Goal Not Met 4/4 total assist  Long Term Goal 2: Pt will perform TB dressing using domitila-dressing techniques with SBA and AE PRN. Goal Not Met 4/4 total assist  Long Term Goal 3: Pt will perform TB bathing using domitila-techniques with SBA and AE PRN Goal Not Met 4/4 total assist  Long Term Goal 4: Pt will tolerate standing for ~6 min with CGA for balance and using LRAD to increase endurance for standing ADLs and funcitonal mobility Goal Not Met 4/4 total assist  Long Term Goal 5: Pt will tolerate x15 reps of RUE AROM to increase strength for ADL tasks and functional transfers Goal Not Met 4/4 total assist    Therapy Time   Individual Concurrent Group Co-treatment   Time In       1230   Time Out       1330   Minutes       60   Timed Code Treatment Minutes: 60 Minutes       Chema Paula, OT

## 2024-04-04 NOTE — PLAN OF CARE
Problem: Discharge Planning  Goal: Discharge to home or other facility with appropriate resources  Outcome: Progressing     Problem: Pain  Goal: Verbalizes/displays adequate comfort level or baseline comfort level  Outcome: Progressing     Problem: Safety - Adult  Goal: Free from fall injury  Outcome: Progressing     Problem: ABCDS Injury Assessment  Goal: Absence of physical injury  Outcome: Progressing     Problem: Skin/Tissue Integrity  Goal: Absence of new skin breakdown  Description: 1.  Monitor for areas of redness and/or skin breakdown  2.  Assess vascular access sites hourly  3.  Every 4-6 hours minimum:  Change oxygen saturation probe site  4.  Every 4-6 hours:  If on nasal continuous positive airway pressure, respiratory therapy assess nares and determine need for appliance change or resting period.  4/4/2024 1052 by Ivory Green, RN  Outcome: Progressing  4/4/2024 0044 by Leanna Paris, RN  Outcome: Progressing     Problem: Nutrition Deficit:  Goal: Optimize nutritional status  Outcome: Progressing     Problem: Neurosensory - Adult  Goal: Achieves stable or improved neurological status  Outcome: Progressing     Problem: Respiratory - Adult  Goal: Achieves optimal ventilation and oxygenation  Outcome: Progressing     Problem: Cardiovascular - Adult  Goal: Maintains optimal cardiac output and hemodynamic stability  Outcome: Progressing     Problem: Skin/Tissue Integrity - Adult  Goal: Skin integrity remains intact  Outcome: Progressing     Problem: Musculoskeletal - Adult  Goal: Return mobility to safest level of function  Outcome: Progressing     Problem: Gastrointestinal - Adult  Goal: Minimal or absence of nausea and vomiting  Outcome: Progressing     Problem: Genitourinary - Adult  Goal: Absence of urinary retention  Outcome: Progressing     Problem: Anxiety  Goal: Will report anxiety at manageable levels  Description: INTERVENTIONS:  1. Administer medication as ordered  2. Teach and rehearse  alternative coping skills  3. Provide emotional support with 1:1 interaction with staff  Outcome: Progressing     Problem: Coping  Goal: Pt/Family able to verbalize concerns and demonstrate effective coping strategies  Description: INTERVENTIONS:  1. Assist patient/family to identify coping skills, available support systems and cultural and spiritual values  2. Provide emotional support, including active listening and acknowledgement of concerns of patient and caregivers  3. Reduce environmental stimuli, as able  4. Instruct patient/family in relaxation techniques, as appropriate  5. Assess for spiritual pain/suffering and initiate Spiritual Care, Psychosocial Clinical Specialist consults as needed  Outcome: Progressing     Problem: Confusion  Goal: Confusion, delirium, dementia, or psychosis is improved or at baseline  Description: INTERVENTIONS:  1. Assess for possible contributors to thought disturbance, including medications, impaired vision or hearing, underlying metabolic abnormalities, dehydration, psychiatric diagnoses, and notify attending LIP  2. [place] high risk fall precautions, as indicated  3. Provide frequent short contacts to provide reality reorientation, refocusing and direction  4. Decrease environmental stimuli, including noise as appropriate  5. Monitor and intervene to maintain adequate nutrition, hydration, elimination, sleep and activity  6. If unable to ensure safety without constant attention obtain sitter and review sitter guidelines with assigned personnel  7. Initiate Psychosocial CNS and Spiritual Care consult, as indicated  Outcome: Progressing     Problem: Chronic Conditions and Co-morbidities  Goal: Patient's chronic conditions and co-morbidity symptoms are monitored and maintained or improved  Outcome: Progressing

## 2024-04-04 NOTE — PROGRESS NOTES
Physical Therapy  Discharge Summary    Name:Zana Voss  MRN:[id number]  :1931  Treatment Diagnosis: decreased balance and mobility  Diagnosis: late effects of cerebral ischemic stroke    Restrictions/Precautions  Restrictions/Precautions: Fall Risk, Up as Tolerated, General Precautions  Required Braces or Orthoses?: No           Position Activity Restriction  Other position/activity restrictions: up with assistance, R weakness, expressive/receptive aphasia     Goals:                  Short Term Goals  Time Frame for Short Term Goals:   Short Term Goal 1: pt will complete STS to platform walker with MAX A x2- GOAL MET, stands with mod A fo 2 to HHA  Short Term Goal 2: pt will perform supine to sit with MOD A- GOAL NOT MET, mod A of 2  Short Term Goal 3: pt will perform bed to chair with stedy and MOD A- GOAL MET with STEDY and min a fo 1  Short Term Goal 4: pt will tolerate gait assessment if/when appropriate for LTG to be set- GOAL MET 3/28, pt ambulated 5 ft iwth HW and max A of 2  Short Term Goal 5: pt will propel WC 50 ft with domitila technique and MOD A- GOAL NOT MET, requires max a to TD            Long Term Goals  Time Frame for Long Term Goals :   Long Term Goal 1: pt will perform STS to platform walker with MOD A- GOAL NOT MET< mod A of 2 to STEDY  Long Term Goal 2: pt will transfer bed to chair with platform walker and MAX A- GOAL NOT MET< use of STEYD  Long Term Goal 3: pt will perform supine <> sit with MIN A- GOAL NOT MET, mod a of 2  Long Term Goal 4: pt will propel WC 50 ft with CGA- GOAL NOT MET, max a  Long Term Goal 5: gait goal to be set 3/29: pt will ambulate 10 ft with min A of 1 and LRAD.- GOAL NOT MET    Pt. Met 3/5 short term goals and 0/5 long term goals.     Pt is not ambulatory at this time, requires max A for wc mobility.   Sit to/from stand with max a to STEDY and TD via STEDY for functional transfers  Bed mobility with TD  Recommend home with 24/7 supv/assist in order

## 2024-04-04 NOTE — PROGRESS NOTES
PALLIATIVE MEDICINE PROGRESS NOTE     Patient name:Zana Voss    MRN:[id number] :1931  Room/Bed:0151/0151-01    LOS: 11 days        ASSESSMENT/RECOMMENDATIONS     89 y.o. male with recent left MCA CVA.  Admitted to ARU 3/24/24 for functional deficits below his baseline.     Symptom management     1) Goals of care:   Comfort care.  Patient and family met with [facility] today.  Patient to return to family home with hospice tomorrow once equipment has been delivered.  Code status changed to DNR CC    2) Debility - due to acute CVA with right hemiparesis and aphasia. Admitted to ARU 3/25/24. Not making consistent progress in rehab as initially hoped.  Has low participation, having difficulty following directions, poor awareness and is still Ax2.  ARU is recommending SNF at this time due to his deficits and needs.   Family does not believe patient will be readily accepting of SNF placement.           Patient/Family Goals of Care :    24    Spoke with son and Hospice ([facility]).  Family has decided on comfort care.  Patient to be discharged home tonight/tomorrow    4/3/24    Spoke with ARU CM.  Team discussed patient today and there is concern patient is declining/not progressing.  Recommendations are for SNF, but team aware patient may not be agreeable to SNF.    Called Ronan EDWARDS.  Updated him on the ARU concerns.  He states he shares the same concerns.  He states he thinks that his dad is probably trying to tell us what he wants by his behaviors in the ARU.  Although he knows the recommendation is for SNF, he says patient will never tolerate it.    We discussed next option to be comfort care.  Reminded him of the discussion we had previously about hospice.  Son was emotional and says he thinks hospice back at patient's home is probably the right next step.  He is agreeable to a referral to [facility].  He is agreeable to discussion with hospice tomorrow and then follow up discussion with

## 2024-04-04 NOTE — PROGRESS NOTES
cues;Increased time to complete;With handrails;Head of bed flat  Roll Left  Assistance Level: Minimal assistance  Roll Right  Assistance Level: Contact guard assist  Sit to Supine  Assistance Level: Dependent  Skilled Clinical Factors: max A of 2  Supine to Sit  Assistance Level: Dependent  Skilled Clinical Factors: max A of 2  Scooting  Assistance Level: Dependent  Skilled Clinical Factors: mod A of 2  Transfers  Surface: To bed;To chair with arms;From chair with arms;Standard toilet;From bed  Additional Factors: Set-up;Verbal cues;Hand placement cues;Increased time to complete;With handrails  Device: Lift equipment  Sit to Stand  Assistance Level: Moderate assistance  Skilled Clinical Factors: mod A to  Debbie Stedy  Stand to Sit  Assistance Level: Moderate assistance  Skilled Clinical Factors: in Debbie Stedy  Bed To/From Chair  Assistance Level: Dependent  Skilled Clinical Factors: mod A with use of Debbie Stedy           Assessment:   Assessment  Assessment: Pt agreeable to OT/PT session with encouragement. Pt's family present and educated on assist level and recent participation of patient with therapy. Pt minimally agreeable to ADLs and transfers, but not participatory with bathing and minimally with grooming and dressing. Pt able to lift LUE to assist with dressing gown and completed oral hygiene with encouragement. Pt assisted back to bed and ACE wrap applied to RUE to assist with edema management. PT assisted with sitting/standing balance and transfers while OT assisted with ADL completion and posture. Pt will discharge home with hospice and care of family.  Activity Tolerance: Patient limited by fatigue;Patient limited by endurance;Treatment limited secondary to decreased cognition  Discharge Recommendations: 24 hour supervision or assist (home with hospice care)  OT Equipment Recommendations  Other: Hospice arranging DME.  Safety Devices  Safety Devices in place: Yes  Type of devices: Gait belt;Call  light within reach;Nurse notified;Bed alarm in place;Left in bed    Signs and Symptoms of Delirium (from CAM)  A. Acute Onset Mental Status Change  Is there evidence of an acute change in mental status from the patient's baseline?  [] 0. No [x] 1. Yes    B. Inattention: Did the patient have difficulty focusing attention, for example being easily distractible or having difficulty keeping track of what was being said?  [] 0. Behavior not present    [x] 1. Behavior continuously present, does not fluctuate   [] 2. Behavior present, fluctuates (comes and goes, changes in severity)    C. Disorganized thinking: Was the patient's thinking disorganized or incoherent (rambling or irrelevant conversation, unclear or illogical flow of ideas, or unpredictable switching from subject to subject)?  [] 0. Behavior not present    [x] 1. Behavior continuously present, does not fluctuate   [] 2. Behavior present, fluctuates (comes and goes, changes in severity)    D. Altered level of consciousness: Did the patient have altered level of consciousness as indicated by any of the following criteria?  Vigilant: startled easily to any sound or touch  Lethargic: repeatedly dozed off when being asked questions, but responded to voice or touch  Stuporous: very difficult to arouse and keep aroused for the interview  Comatose: could not be aroused  [x] 0. Behavior not present    [] 1. Behavior continuously present, does not fluctuate   [] 2. Behavior present, fluctuates (comes and goes, changes in severity)      Electronically signed by Chema Paula OT on 4/4/2024 at 2:42 PM

## 2024-04-04 NOTE — PROGRESS NOTES
Zana Voss  4/4/2024  [id number]    Chief Complaint: Late effects of cerebral ischemic stroke    Subjective:   No acute events overnight. Today Zana is seen resting in bed. History is limited from patient due to aphasia. He appears comfortable and does not demonstrate any pain behaviors.     Family met with hospice and plan to discharge home with hospice.     ROS: unable to reliably obtain    Objective:  Patient Vitals for the past 24 hrs:   BP Temp Temp src Pulse Resp SpO2   04/04/24 1418 128/71 -- -- 54 -- 96 %   04/04/24 1235 128/71 -- -- 52 -- 96 %   04/04/24 0713 (!) 163/70 98.5 °F (36.9 °C) Oral 53 16 94 %   04/03/24 2101 134/62 98.7 °F (37.1 °C) Oral 60 18 94 %     Gen: No distress, seated up in bed  HEENT: Normocephalic, atraumatic.   CV: RRR  Resp: No respiratory distress. Lungs CTAB  Abd: Soft, nondistended  Ext: No edema.   Neuro: Alert, aphasic    Wt Readings from Last 3 Encounters:   03/27/24 82.5 kg (181 lb 14.1 oz)   03/21/24 95.3 kg (210 lb)   08/03/23 95.3 kg (210 lb)       Laboratory data:   Lab Results   Component Value Date    WBC 9.2 04/04/2024    HGB 14.5 04/04/2024    HCT 44.9 04/04/2024    MCV 88.2 04/04/2024     04/04/2024       Lab Results   Component Value Date/Time     04/04/2024 07:10 AM    K 3.7 04/04/2024 07:10 AM     04/04/2024 07:10 AM    CO2 24 04/04/2024 07:10 AM    BUN 31 04/04/2024 07:10 AM    CREATININE 0.8 04/04/2024 07:10 AM    GLUCOSE 102 04/04/2024 07:10 AM    CALCIUM 8.9 04/04/2024 07:10 AM        Therapy progress:  Physical therapy:  Bed Mobility:     Sit>supine:  Assistance Level: Moderate assistance, Requires x 2 assistance  Skilled Clinical Factors: 2 person assist this am  Supine>sit:  Assistance Level: Moderate assistance, Requires x 2 assistance  Skilled Clinical Factors: mod A of 2  Transfers:     Sit>stand:  Assistance Level: Moderate assistance  Skilled Clinical Factors: sit to stand EOB to STEDy, commode to STEDY and shower chair to  STEDY with mod A of 1  Stand>sit:  Assistance Level: Maximum assistance  Skilled Clinical Factors: poor eccentric control  Bed<>chair  Assistance Level: Dependent, Maximum assistance, Requires x 2 assistance  Skilled Clinical Factors: sit pivot from wc to EOB with max a of 2 with R side leading  Stand Pivot:  Assistance Level: Requires x 2 assistance, Dependent  Skilled Clinical Factors: via STEDY EOB to commode and w/c with mod A for trunk control in STEDY  Lateral transfer:     Car transfer:     Ambulation:  Surface: Level surface  Device: Parallel Bars  Distance: 2 steps  Assistance Level: Requires x 2 assistance, Maximum assistance, Dependent  Gait Deviations: Slow abdiaziz, Unsteady gait, Decreased step length right, Decreased weight shift bilateral  Skilled Clinical Factors: PT facilitatign RLE progression/stability during swing/stance phase, weight shift, hip extension, OT facilitatign weight shift, balance, trunk extension and RUE management/Wbing  Stairs:     Curb:     Wheelchair:  Surface: Level surface  Device: Standard wheelchair  Assistance Required to Manage Parts: Right leg rest  Assistance Level for Propulsion: Maximum assistance  Propulsion Distance: 100 ft  Skilled Clinical Factors: hand over hand to initiate propulsion with UE from OT, PT facilitating hand over hand LLE movemetn to attempt for pt to progress chair more indepdently. pt will continue LE motion for 2-3 strokes with tactile cues removed, then stops. pt unable to utilize LLE for propulsion/steering assistance (minimal improvement of LLE movement, however not functional to complete and turn/forward progressioN)    Occupational therapy:   Feeding  Assistance Level: Minimal assistance  Skilled Clinical Factors: to drink from Ensure bottle, min A to catch spillage out of R side of mouth  Grooming/Oral Hygiene  Assistance Level: Moderate assistance  Skilled Clinical Factors: for sitting balance at EOB and min VCs for encouragement and

## 2024-04-05 VITALS
WEIGHT: 181.88 LBS | DIASTOLIC BLOOD PRESSURE: 90 MMHG | OXYGEN SATURATION: 99 % | HEIGHT: 67 IN | BODY MASS INDEX: 28.55 KG/M2 | SYSTOLIC BLOOD PRESSURE: 179 MMHG | TEMPERATURE: 98.5 F | HEART RATE: 56 BPM | RESPIRATION RATE: 16 BRPM

## 2024-04-05 PROCEDURE — 6370000000 HC RX 637 (ALT 250 FOR IP): Performed by: STUDENT IN AN ORGANIZED HEALTH CARE EDUCATION/TRAINING PROGRAM

## 2024-04-05 RX ORDER — NIFEDIPINE 30 MG/1
90 TABLET, EXTENDED RELEASE ORAL DAILY
Qty: 90 TABLET | Refills: 2 | Status: SHIPPED | OUTPATIENT
Start: 2024-04-06

## 2024-04-05 RX ADMIN — ASPIRIN 81 MG 81 MG: 81 TABLET ORAL at 08:51

## 2024-04-05 RX ADMIN — TAMSULOSIN HYDROCHLORIDE 0.4 MG: 0.4 CAPSULE ORAL at 08:51

## 2024-04-05 RX ADMIN — NIFEDIPINE 90 MG: 30 TABLET, FILM COATED, EXTENDED RELEASE ORAL at 08:51

## 2024-04-05 RX ADMIN — MEGESTROL ACETATE 20 MG: 20 TABLET ORAL at 08:51

## 2024-04-05 RX ADMIN — NYSTATIN 500000 UNITS: 100000 SUSPENSION ORAL at 08:51

## 2024-04-05 RX ADMIN — CLOPIDOGREL BISULFATE 75 MG: 75 TABLET ORAL at 08:51

## 2024-04-05 NOTE — PROGRESS NOTES
Spoke with Annette at [facility]. Specialty bed has been scheduled for . Confirmation # [id number]

## 2024-04-05 NOTE — PROGRESS NOTES
IRF JOHN Discharge Assessment  [facility] Acute Rehab Unit    Patient:Zana Voss     Rehab Dx/Hx: Late effects of cerebral ischemic stroke [I69.30]   :1931  MRN:[id number]  Date of Admit: 3/24/2024     Pain Effect on Sleep:  Over the past 5 days, how much of the time has pain made it hard for you to sleep at night?  []  0. Does not apply - I have not had any pain or hurting in the past 5 days  []  1. Rarely or not at all  []  2. Occasionally  []  3. Frequently  []  4. Almost constantly  [x]  8. Unable to answer    Over the past 5 days, how often have you limited your participation in rehabilitation therapy sessions due to pain?  []  0. Does not apply - I have not received rehabilitation therapy in the past 5 days  []  1. Rarely or not at all  []  2. Occasionally  []  3. Frequently  []  4. Almost constantly  [x]  8. Unable to answer    Pain Interference with Day-to-Day Activities:  Over the past 5 days, how often have you limited your day-to-day activities (excluding rehabilitation therapy session)?  []  1. Rarely or not at all  []  2. Occasionally  []  3. Frequently  []  4. Almost constantly  [x]  8. Unable to answer      High-Risk Drug Classes: Use and Indication   Is taking: Check if the pt is taking any medications by pharmacological classification  Indication noted: If column 1 is checked, check if there is an indication noted for all meds in the drug class Is taking  (check all that apply) Indication noted (check all that apply)   Antipsychotic [] []   Anticoagulant [] []   Antibiotic [] []   Opioid [] []   Antiplatelet [] []   Hypoglycemic (including insulin) [] []   None of the above [x] []     Special Treatments, Procedures, and Programs   Check all of the following treatments, procedures, and programs that apply on discharge.  On discharge (check all that apply)   Cancer Treatments    A1. Chemotherapy []   A2. IV []   A3. Oral []   A10. Other []   B1. Radiation []   Respiratory Therapies

## 2024-04-05 NOTE — CARE COORDINATION
CM received a call from Jory richard/[facility] [phone] updating writer of plan. Jory stated that equipment will be delivered to home and transport set up for 1030-11 am but, needs writer to put the transport packet together and have MD sign the DNR CC form.     Rossana Su RN

## 2024-04-05 NOTE — PROGRESS NOTES
I spoke with Jory from [facility]. Transportation to home has been arranged with [facility]. Tenative  time of 10:30 am.

## 2024-04-05 NOTE — CARE COORDINATION
Case Management Discharge Summary  [facility] - Acute Rehab Unit    Patient:Zana Voss     Rehab Dx/Hx: Late effects of cerebral ischemic stroke [I69.30]       Today's Date: 4/5/2024    Discharge to:  Home with [facility]   Pre-certification completed:  [x] No       [] Yes     [] N/A   Hospital Exemption Notification (HENS) completed:  [x] No       [] Yes     [] N/A   IMM given:  N/a       New Durable Medical Equipment ordered/agency:  Provided by [facility]   Transportation:  Medical Transport explained to pt/family. Pt/family voice no agency preference.    Agency used: [facility]   time: 1030 am  Ambulance form completed: [] No       [x] Yes   [] N/A       Confirmed discharge plan with:  Patient: [] No       [x] Yes per [facility] nurse  Family:  [] No       [x] Yes  per [facility] nurse    Facility/Agency, name:    [place] [place]  [facility]  [address] [place] [id number]  [phone]   JESSICA/AVS faxed    RN, name: Ivory       Has lack of transportation kept you from medical appointments, meetings, work, or ADL's? [] Yes, it has kept me from medical appointments or from getting my meds  [] Yes, It has kept me from non-medical meetings, appointments, work, or getting things I need  [] No  [x] Pt unable to respond  [] Pt declines to respond     How often do you need to have someone help you when you read instructions, pamphlets, or other written material from your doctor or pharmacy? [] Never                             [] Always  [] Rarely                            [] Patient declines to respond  [] Sometimes                    [x] Patient unable to respond  [] Often       Note: Discharging nurse to complete JESSICA, reconcile AVS, and place final copy with patient's discharge packet. RN to ensure that written prescriptions for  Level II medications are sent with patient to the facility as per protocol.          Signature: Rossana Su,

## 2024-04-05 NOTE — PROGRESS NOTES
I spoke with daughter Lianna who is secondary decision maker. Discharged instructions were reviewed and was made aware that patient is en route to home.

## 2024-04-05 NOTE — PROGRESS NOTES
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  Message left for POMABEL Ferraro to return phone call to discuss discharge summary (AVS)  including medications, physical instructions (safety) and diet. Pt in stable condition.     Reconciled Medication List - Patient:   Medications were reviewed by RN at time of discharge with patient and/or family:  []  Via EMR   []  Via health information exchange  []  Verbal (e.g. in person, telephone, video conferencing)  []  Paper-based (e.g. fax, copies, printouts)   [x]  Other Methods (e.g. texting, email, CDs)-Message left for POA    Reconciled Medication List - Subsequent provider:   [x] No, current reconciled medication list not provided to the subsequent provider.  [] Yes, current reconciled medication list provided to the subsequent provider.   [] Via EMR    [] Via health information exchange  [] Verbal (e.g. in person, telephone, video conferencing)  [] Paper-based (e.g. fax, copies, printouts)   [] Other Methods (e.g. texting, email, CDs)    Discharge disposition:  Pt was discharged via:  []  Wheelchair  [x]  Stretcher  []  Safe ambulation and use of assistive device  []  Other:     Pt was discharged to:  []  Private car  [x]  Transportation service to next destination  []  Other:     Discharge destination:  []  Home  []  Skilled Nursing Facility  []  Long Term Care  [x]  Other: Home with Hospice       Discharge BIMS:99  Number of word repeated after first attempt:  [x]  0. None []  1. One []  2. Two []  3. Three    Able to report correct year:  [x]  0. Missed by >5 years, or no answer  []  1. Missed by 2-5 years  []  2. Missed by 1 year  []  3. Correct    Able to report correct month:   [x]  0. Missed by >1 month, or no answer  []  1.            Discharging Nurse Signature: Ivory Green RN

## 2024-04-05 NOTE — DISCHARGE SUMMARY
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 CT head showed scattered ill-defined areas of hypodensity seen in the left parietal lobe, the largest of which is seen posteriorly. CTA head and neck showed short segment occlusion on the M1 segment of the left middle MCA and multifocal stenosis. He was determined to not be a candidate for TNK due to being outside of the time window. MRI brain showed late acute/early subacute infarct involving the left temporal lobe, left centrum semiovale, and left parietal cortex. Neurology was consulted. Hospital course was complicated by elevated Cr, elevated troponin, and leukocytosis. He was admitted to [facility] on 3/24 due to functional deficits below his baseline. Today he is seen without family present. History is limited due to aphasia.     Inpatient Rehabilitation Course:   Zana Voss is a 89 y.o. male admitted to inpatient rehabilitation on 3/24/2024 with Late effects of cerebral ischemic stroke.  The patient participated in an aggressive multidisciplinary inpatient rehabilitation program involving 3 hours of therapy per day, at least 5 days per week. Discharging home with hospice.     Impairments: impaired mobility and ADLs, impaired gait and balance    Medical Management:    Left MCA CVA  - left MCA occlusion  - with right hemiparesis, aphasia  - secondary stroke prevention with asa,

## 2024-04-05 NOTE — DISCHARGE INSTR - COC
of transient ischemic attack (TIA) Z86.73    Elevated serum creatinine R79.89    Late effects of cerebral ischemic stroke I69.30       Isolation/Infection:   Isolation            No Isolation          Patient Infection Status       None to display                     Nurse Assessment:  Last Vital Signs: BP (!) 179/90   Pulse 56   Temp 98.5 °F (36.9 °C) (Oral)   Resp 16   Ht 1.702 m (5' 7\")   Wt 82.5 kg (181 lb 14.1 oz)   SpO2 99%   BMI 28.49 kg/m²     Last documented pain score (0-10 scale): Pain Level: 5  Last Weight:   Wt Readings from Last 1 Encounters:   03/27/24 82.5 kg (181 lb 14.1 oz)     Mental Status:  alert and NAV d/t aphasia    IV Access:  - None    Nursing Mobility/ADLs:  Walking   Dependent  Transfer  Dependent  Bathing  Dependent  Dressing  Dependent  Toileting  Dependent  Feeding  Assisted  Med Admin  Dependent  Med Delivery   whole    Wound Care Documentation and Therapy:        Elimination:  Continence:   Bowel: No  Bladder: No  Urinary Catheter: None   Colostomy/Ileostomy/Ileal Conduit: No       Date of Last BM: ***    Intake/Output Summary (Last 24 hours) at 4/5/2024 0904  Last data filed at 4/4/2024 1657  Gross per 24 hour   Intake 100 ml   Output --   Net 100 ml     I/O last 3 completed shifts:  In: 100 [P.O.:100]  Out: -     Safety Concerns:     History of Falls (last 30 days) and At Risk for Falls    Impairments/Disabilities:      Vision and Hearing    Nutrition Therapy:  Current Nutrition Therapy:   - Oral Diet:  General    Routes of Feeding: Oral  Liquids: Thin Liquids  Daily Fluid Restriction: no  Last Modified Barium Swallow with Video (Video Swallowing Test): not done    Treatments at the Time of Hospital Discharge:   Respiratory Treatments: ***  Oxygen Therapy:  is not on home oxygen therapy.  Ventilator:    - No ventilator support    Rehab Therapies: {THERAPEUTIC INTERVENTION:[id number]}  Weight Bearing Status/Restrictions: No weight bearing restrictions  Other Medical Equipment  (for information only, NOT a DME order):  {EQUIPMENT:[id number]}  Other Treatments: ***    Patient's personal belongings (please select all that are sent with patient):  Dentures upper and lower    RN SIGNATURE:  Electronically signed by Ivory Green RN on 4/5/24 at 9:06 AM EDT    CASE MANAGEMENT/SOCIAL WORK SECTION    Inpatient Status Date: 03/24/24    Readmission Risk Assessment Score:  Readmission Risk              Risk of Unplanned Readmission:  16       Discharging Agency :    [place] [place]  [facility]  [address] [place] [id number]  [phone]     / signature: Electronically signed by Rossana Su RN on 4/5/24 at 9:19 AM EDT    PHYSICIAN SECTION    Prognosis: Good    Condition at Discharge: Stable    Rehab Potential (if transferring to Rehab): Good    Recommended Labs or Other Treatments After Discharge:     Physician Certification: I certify the above information and transfer of Zana Voss  is necessary for the continuing treatment of the diagnosis listed and that he requires Hospice for less 30 days.     Update Admission H&P: No change in H&P    PHYSICIAN SIGNATURE:  Electronically signed by Anai Gutierrez MD on 4/5/24 at 10:14 AM EDT

## (undated) DEVICE — ENDO CARRY-ON PROCEDURE KIT INCLUDES SUCTION TUBING, LUBRICANT, GAUZE, BIOHAZARD STICKER, TRANSPORT PAD AND INTERCEPT BEDSIDE KIT.: Brand: ENDO CARRY-ON PROCEDURE KIT

## (undated) DEVICE — CONMED SCOPE SAVER BITE BLOCK, 20X27 MM: Brand: SCOPE SAVER

## (undated) DEVICE — FORCEP BX STD CAP 240CM RAD JAW 4

## (undated) DEVICE — CANNULA,OXY,ADULT,SUPERSOFT,W/7'TUB,SC: Brand: MEDLINE INDUSTRIES, INC.

## (undated) DEVICE — Device: Brand: DISPOSABLE ELECTROSURGICAL SNARE

## (undated) DEVICE — ENDOSCOPIC KIT 2 12 FT OP4 DE2 GWN SYR

## (undated) DEVICE — YANKAUER,BULB TIP,W/O VENT,RIGID,STERILE: Brand: MEDLINE

## (undated) DEVICE — ELECTRODE,RADIOTRANSLUCENT,FOAM,3PK: Brand: MEDLINE

## (undated) DEVICE — TRAP POLYP ETRAP